# Patient Record
Sex: FEMALE | Race: OTHER | HISPANIC OR LATINO | URBAN - METROPOLITAN AREA
[De-identification: names, ages, dates, MRNs, and addresses within clinical notes are randomized per-mention and may not be internally consistent; named-entity substitution may affect disease eponyms.]

---

## 2018-05-16 ENCOUNTER — OUTPATIENT (OUTPATIENT)
Dept: OUTPATIENT SERVICES | Facility: HOSPITAL | Age: 28
LOS: 1 days | Discharge: HOME | End: 2018-05-16

## 2018-06-27 ENCOUNTER — OUTPATIENT (OUTPATIENT)
Dept: OUTPATIENT SERVICES | Facility: HOSPITAL | Age: 28
LOS: 1 days | Discharge: HOME | End: 2018-06-27

## 2018-06-30 ENCOUNTER — INPATIENT (INPATIENT)
Facility: HOSPITAL | Age: 28
LOS: 1 days | Discharge: HOME | End: 2018-07-02
Attending: HOSPITALIST | Admitting: HOSPITALIST
Payer: SUBSIDIZED

## 2018-06-30 VITALS
TEMPERATURE: 98 F | RESPIRATION RATE: 18 BRPM | SYSTOLIC BLOOD PRESSURE: 118 MMHG | WEIGHT: 134.92 LBS | DIASTOLIC BLOOD PRESSURE: 78 MMHG | HEART RATE: 90 BPM | OXYGEN SATURATION: 99 % | HEIGHT: 65 IN

## 2018-06-30 LAB
ALBUMIN SERPL ELPH-MCNC: 4.6 G/DL — SIGNIFICANT CHANGE UP (ref 3.5–5.2)
ALP SERPL-CCNC: 88 U/L — SIGNIFICANT CHANGE UP (ref 30–115)
ALT FLD-CCNC: 15 U/L — SIGNIFICANT CHANGE UP (ref 0–41)
ANION GAP SERPL CALC-SCNC: 15 MMOL/L — HIGH (ref 7–14)
APTT BLD: 31 SEC — SIGNIFICANT CHANGE UP (ref 27–39.2)
AST SERPL-CCNC: 19 U/L — SIGNIFICANT CHANGE UP (ref 0–41)
BASOPHILS # BLD AUTO: 0.02 K/UL — SIGNIFICANT CHANGE UP (ref 0–0.2)
BASOPHILS NFR BLD AUTO: 0.2 % — SIGNIFICANT CHANGE UP (ref 0–1)
BILIRUB SERPL-MCNC: 0.5 MG/DL — SIGNIFICANT CHANGE UP (ref 0.2–1.2)
BUN SERPL-MCNC: 8 MG/DL — LOW (ref 10–20)
CALCIUM SERPL-MCNC: 9.3 MG/DL — SIGNIFICANT CHANGE UP (ref 8.5–10.1)
CHLORIDE SERPL-SCNC: 95 MMOL/L — LOW (ref 98–110)
CO2 SERPL-SCNC: 22 MMOL/L — SIGNIFICANT CHANGE UP (ref 17–32)
CREAT SERPL-MCNC: 0.6 MG/DL — LOW (ref 0.7–1.5)
EOSINOPHIL # BLD AUTO: 0.02 K/UL — SIGNIFICANT CHANGE UP (ref 0–0.7)
EOSINOPHIL NFR BLD AUTO: 0.2 % — SIGNIFICANT CHANGE UP (ref 0–8)
GLUCOSE SERPL-MCNC: 149 MG/DL — HIGH (ref 70–99)
HCT VFR BLD CALC: 34 % — LOW (ref 37–47)
HGB BLD-MCNC: 11.5 G/DL — LOW (ref 12–16)
IMM GRANULOCYTES NFR BLD AUTO: 0.2 % — SIGNIFICANT CHANGE UP (ref 0.1–0.3)
INR BLD: 1.37 RATIO — HIGH (ref 0.65–1.3)
LACTATE SERPL-SCNC: 1.2 MMOL/L — SIGNIFICANT CHANGE UP (ref 0.5–2.2)
LYMPHOCYTES # BLD AUTO: 1.43 K/UL — SIGNIFICANT CHANGE UP (ref 1.2–3.4)
LYMPHOCYTES # BLD AUTO: 11.6 % — LOW (ref 20.5–51.1)
MCHC RBC-ENTMCNC: 28.5 PG — SIGNIFICANT CHANGE UP (ref 27–31)
MCHC RBC-ENTMCNC: 33.8 G/DL — SIGNIFICANT CHANGE UP (ref 32–37)
MCV RBC AUTO: 84.2 FL — SIGNIFICANT CHANGE UP (ref 81–99)
MONOCYTES # BLD AUTO: 0.92 K/UL — HIGH (ref 0.1–0.6)
MONOCYTES NFR BLD AUTO: 7.4 % — SIGNIFICANT CHANGE UP (ref 1.7–9.3)
NEUTROPHILS # BLD AUTO: 9.94 K/UL — HIGH (ref 1.4–6.5)
NEUTROPHILS NFR BLD AUTO: 80.4 % — HIGH (ref 42.2–75.2)
NRBC # BLD: 0 /100 WBCS — SIGNIFICANT CHANGE UP (ref 0–0)
PLATELET # BLD AUTO: 232 K/UL — SIGNIFICANT CHANGE UP (ref 130–400)
POTASSIUM SERPL-MCNC: 3.8 MMOL/L — SIGNIFICANT CHANGE UP (ref 3.5–5)
POTASSIUM SERPL-SCNC: 3.8 MMOL/L — SIGNIFICANT CHANGE UP (ref 3.5–5)
PROT SERPL-MCNC: 7.5 G/DL — SIGNIFICANT CHANGE UP (ref 6–8)
PROTHROM AB SERPL-ACNC: 14.7 SEC — HIGH (ref 9.95–12.87)
RBC # BLD: 4.04 M/UL — LOW (ref 4.2–5.4)
RBC # FLD: 14 % — SIGNIFICANT CHANGE UP (ref 11.5–14.5)
SODIUM SERPL-SCNC: 132 MMOL/L — LOW (ref 135–146)
WBC # BLD: 12.36 K/UL — HIGH (ref 4.8–10.8)
WBC # FLD AUTO: 12.36 K/UL — HIGH (ref 4.8–10.8)

## 2018-06-30 RX ORDER — AMPICILLIN SODIUM AND SULBACTAM SODIUM 250; 125 MG/ML; MG/ML
3 INJECTION, POWDER, FOR SUSPENSION INTRAMUSCULAR; INTRAVENOUS EVERY 6 HOURS
Refills: 0 | Status: DISCONTINUED | OUTPATIENT
Start: 2018-06-30 | End: 2018-07-02

## 2018-06-30 RX ORDER — AMPICILLIN SODIUM AND SULBACTAM SODIUM 250; 125 MG/ML; MG/ML
3 INJECTION, POWDER, FOR SUSPENSION INTRAMUSCULAR; INTRAVENOUS ONCE
Refills: 0 | Status: COMPLETED | OUTPATIENT
Start: 2018-06-30 | End: 2018-06-30

## 2018-06-30 RX ORDER — MORPHINE SULFATE 50 MG/1
4 CAPSULE, EXTENDED RELEASE ORAL ONCE
Refills: 0 | Status: DISCONTINUED | OUTPATIENT
Start: 2018-06-30 | End: 2018-06-30

## 2018-06-30 RX ORDER — MORPHINE SULFATE 50 MG/1
4 CAPSULE, EXTENDED RELEASE ORAL
Refills: 0 | Status: DISCONTINUED | OUTPATIENT
Start: 2018-06-30 | End: 2018-07-02

## 2018-06-30 RX ORDER — SODIUM CHLORIDE 9 MG/ML
1000 INJECTION, SOLUTION INTRAVENOUS
Refills: 0 | Status: DISCONTINUED | OUTPATIENT
Start: 2018-06-30 | End: 2018-07-02

## 2018-06-30 RX ADMIN — MORPHINE SULFATE 4 MILLIGRAM(S): 50 CAPSULE, EXTENDED RELEASE ORAL at 15:17

## 2018-06-30 RX ADMIN — AMPICILLIN SODIUM AND SULBACTAM SODIUM 200 GRAM(S): 250; 125 INJECTION, POWDER, FOR SUSPENSION INTRAMUSCULAR; INTRAVENOUS at 15:02

## 2018-06-30 RX ADMIN — AMPICILLIN SODIUM AND SULBACTAM SODIUM 200 GRAM(S): 250; 125 INJECTION, POWDER, FOR SUSPENSION INTRAMUSCULAR; INTRAVENOUS at 23:28

## 2018-06-30 RX ADMIN — SODIUM CHLORIDE 100 MILLILITER(S): 9 INJECTION, SOLUTION INTRAVENOUS at 20:29

## 2018-06-30 NOTE — ED PROVIDER NOTE - PROGRESS NOTE DETAILS
Dental evaluated patient. Agrees with admission for IV abx. ENT ELIAZAR Aj aware and will evaluate during admission but sts findings on CT are dental related, not ENT.

## 2018-06-30 NOTE — H&P ADULT - NSHPSOCIALHISTORY_GEN_ALL_CORE
Marital Status:  (   )    (has a boyfriend) Single    (   )    (  )   Occupation: not working  Lives with: (  ) alone  (  ) children   (  ) spouse   (  ) parents  (partner) other    Substance Use (street drugs): (x) never used  (  ) other:  Tobacco Usage:  ( x) never smoked   (   ) former smoker   (   ) current smoker  (     ) pack year  (        ) last cigarette date  Alcohol Usage: none

## 2018-06-30 NOTE — ED PROVIDER NOTE - ATTENDING CONTRIBUTION TO CARE
seen with PA agree with above, extensive facial swellin, CT- swelling down to  carotids and hyoid bone, will need admission for more antibiotics and further evaluation

## 2018-06-30 NOTE — H&P ADULT - ASSESSMENT
28Y F with no significant pmh presents to the ED with R facial swelling, erythema, pain and warmth since she had 2 wisdom teeth extracted on the same side.    R sided mandibular swelling, erythema and pain 2/2 28Y F with no significant pmh presents to the ED with R facial swelling, erythema, pain and warmth since she had 2 wisdom teeth extracted on the same side.    R sided mandibular swelling, erythema and pain 2/2 possible neck abscess and mandibular inflammation  -Admit to medicine  -Start unasyn 6gm q8h  -dental and ENT following  -IV steroids for decreasing inflammation and since pt is having difficulty swallowing 2/2 swelling and pain  -IV morphine for pain control    Hyponatremia  -likely 2/2 decreased PO intake  -Start on LR @ 100ml/hr    Diet: liquid diet    DVT ppx: encourage early ambulation    Dispo: pt can be discharged on PO abx once symptoms improve 28Y F with no significant pmh presents to the ED with R facial swelling, erythema, pain and warmth since she had 2 wisdom teeth extracted on the same side.    R sided mandibular swelling, erythema and pain 2/2 possible neck abscess and mandibular inflammation  -Admit to medicine  -Start unasyn 6gm q8h  -dental and ENT following  -steroid solution for decreasing inflammation and since pt is having difficulty swallowing 2/2 swelling and pain  -IV morphine for pain control    Hyponatremia  -likely 2/2 decreased PO intake  -Start on LR @ 100ml/hr    Diet: liquid diet    DVT ppx: encourage early ambulation    Dispo: pt can be discharged on PO abx once symptoms improve

## 2018-06-30 NOTE — H&P ADULT - NSHPPHYSICALEXAM_GEN_ALL_CORE
T(F): 97.7 (06-30-18 @ 12:15), Max: 97.7 (06-30-18 @ 12:15)  HR: 90 (06-30-18 @ 12:15) (90 - 90)  BP: 118/78 (06-30-18 @ 12:15) (118/78 - 118/78)  RR: 18 (06-30-18 @ 12:15) (18 - 18)  SpO2: 99% (06-30-18 @ 12:15) (99% - 99%)  PHYSICAL EXAM:  GENERAL: NAD, speaks in full few words due to facial swelling, no signs of respiratory distress  HEAD: R sided facial/mandibular swelling, erythema, warmth. mild lymphadenopathy on R mandibular region  EYES: EOMI, conjunctiva and sclera clear  CHEST/LUNG: Clear to auscultation bilaterally; No wheeze; No crackles; No accessory muscles used  HEART: Regular rate and rhythm; No murmurs;   ABDOMEN: Soft, Nontender, Nondistended; Bowel sounds present; No guarding  EXTREMITIES:  2+ Peripheral Pulses, No cyanosis or edema  PSYCH: AAOx3  NEUROLOGY: non-focal

## 2018-06-30 NOTE — ED PROVIDER NOTE - OBJECTIVE STATEMENT
facial swelling Pt is a 29 yo F c/o R facial swelling sp wisdom tooth extraction in the clinic 1 week ago. Pt has been taking Amoxicillin since the surgery but today woke up with significant R lower facial swelling and pain. Pt reports difficulty opening her mouth due to pain. Sts pain also extends into R neck. No fever or chills. Pt able to swallow and has no difficulty breathing. Denies drooling or inability to tolerate secretions.

## 2018-06-30 NOTE — H&P ADULT - NSHPLABSRESULTS_GEN_ALL_CORE
11.5   12.36 )-----------( 232      ( 30 Jun 2018 12:31 )             34.0       06-30    132<L>  |  95<L>  |  8<L>  ----------------------------<  149<H>  3.8   |  22  |  0.6<L>    Ca    9.3      30 Jun 2018 12:31    TPro  7.5  /  Alb  4.6  /  TBili  0.5  /  DBili  x   /  AST  19  /  ALT  15  /  AlkPhos  88  06-30          PT/INR - ( 30 Jun 2018 12:31 )   PT: 14.70 sec;   INR: 1.37 ratio         PTT - ( 30 Jun 2018 12:31 )  PTT:31.0 sec    Lactate Trend  06-30 @ 12:31 Lactate:1.2

## 2018-06-30 NOTE — H&P ADULT - HISTORY OF PRESENT ILLNESS
28Y F with no significant pmh presents to the ED with R facial swelling, erythema, pain and warmth since she had 2 wisdom teeth extracted on the same side. As per patient the swelling and redness never resolved and infact became worse with time. She was discharged on Amoxicillin 500 q8h abd tylenol with codeine 28Y F with no significant pmh presents to the ED with R facial swelling, erythema, pain and warmth since she had 2 wisdom teeth extracted on the same side. As per patient the swelling and redness never resolved and became worse with time. She was discharged on Amoxicillin 500 q8h and tylenol with codeine which she says she was taking since the discharge. Patient also developed severe pain which is 10/10 and radiates to the R side of the neck. Patient denies fevers, chills, pus from the incision site 28Y F with no significant pmh presents to the ED with R facial swelling, erythema, pain and warmth since she had 2 wisdom teeth extracted on the same side. As per patient the swelling and redness never resolved and became worse with time. She was discharged on Amoxicillin 500 q8h and tylenol with codeine which she says she was taking since the discharge. Patient also developed severe pain which is 10/10 and radiates to the R side of the neck. Patient denies fevers, chills, pus from the incision site. Due to the pain she has decreased PO intake. CT scan showed extensive inflammation and fluid within the carotid sheath. She was seen by dental in the ED and recommended to get IV abx.

## 2018-06-30 NOTE — CONSULT NOTE ADULT - SUBJECTIVE AND OBJECTIVE BOX
29 yo female s/p right wisdom tooth extraction 6/27, pt started developing right facial swelling and trismus Wednesday night, pt presented to ED with worsening facial/neck swelling on right    PAST MEDICAL & SURGICAL HISTORY:  none    Home Medications:  amoxicillin    Allergies    No Known Allergies    Intolerances    SHx - NC    FHx - NC    Vital Signs Last 24 Hrs  T(C): 36.5 (30 Jun 2018 12:15), Max: 36.5 (30 Jun 2018 12:15)  T(F): 97.7 (30 Jun 2018 12:15), Max: 97.7 (30 Jun 2018 12:15)  HR: 90 (30 Jun 2018 12:15) (90 - 90)  BP: 118/78 (30 Jun 2018 12:15) (118/78 - 118/78)  RR: 18 (30 Jun 2018 12:15) (18 - 18)  SpO2: 99% (30 Jun 2018 12:15) (99% - 99%)    pt seen and examined at bedside  a+ox3, nad, toxic appearance  nc/at, perrl  +right cheek swelling/induration/erythema ttp, selling goes down to right submandibular area  oc - +trismus, 2 finger breadths, no visible pus in area of extracted tooth, + right buccal mucosa swelling                          11.5   12.36 )-----------( 232      ( 30 Jun 2018 12:31 )             34.0     < from: CT Neck Soft Tissue w/ IV Cont (06.30.18 @ 15:45) >  IMPRESSION:    1. Extensive inflammatory changes predominantly lateral to the right side   of the mandible (third molar tooth extraction pocket identified) with   extension inferiorly to the level of the hyoid bone. A discrete enhancing   fluid collection is not identified. Fluid withinthe right carotid sheath.    2. Bilateral level I and level II lymphadenopathy likely reactive    < end of copied text >
28 year old female presents with lower right facial swelling post surgical extraction of #32 in dental clinic on 6/27/18. Patient reports taking Amoxicillin as previously prescribed.      MEDICATIONS  (STANDING):  ampicillin/sulbactam  IVPB 3 Gram(s) IV Intermittent Once    Vital Signs Last 24 Hrs  T(C): 36.5 (30 Jun 2018 12:15), Max: 36.5 (30 Jun 2018 12:15)  T(F): 97.7 (30 Jun 2018 12:15), Max: 97.7 (30 Jun 2018 12:15)  HR: 90 (30 Jun 2018 12:15) (90 - 90)  BP: 118/78 (30 Jun 2018 12:15) (118/78 - 118/78)  BP(mean): --  RR: 18 (30 Jun 2018 12:15) (18 - 18)  SpO2: 99% (30 Jun 2018 12:15) (99% - 99%)    LABS:                        11.5   12.36 )-----------( 232      ( 30 Jun 2018 12:31 )             34.0     06-30    132<L>  |  95<L>  |  8<L>  ----------------------------<  149<H>  3.8   |  22  |  0.6<L>    Ca    9.3      30 Jun 2018 12:31    TPro  7.5  /  Alb  4.6  /  TBili  0.5  /  DBili  x   /  AST  19  /  ALT  15  /  AlkPhos  88  06-30    WBC Count: 12.36 K/uL <H> [4.80 - 10.80] (06-30 @ 12:31)  Platelet Count - Automated: 232 K/uL [130 - 400] (06-30 @ 12:31)  INR: 1.37 ratio <H> [0.65 - 1.30] (06-30 @ 12:31)

## 2018-06-30 NOTE — ED PROVIDER NOTE - NS ED ROS FT
CONST: No fever, chills or bodyaches  EYES: No pain, redness, drainage or visual changes.  ENT: +R facial swelling nad pain. No ear pain or discharge, nasal discharge or congestion. No sore throat  CARD: No chest pain, palpitations  RESP: No SOB, cough, hemoptysis. No hx of asthma or COPD  GI: No abdominal pain, N/V/D  MS: No joint pain, back pain or extremity pain/injury  SKIN: No rashes  NEURO: No headache, dizziness, paresthesias or LOC

## 2018-06-30 NOTE — CONSULT NOTE ADULT - ASSESSMENT
27 yo with soft tissue infection of right cheek/neck s/p wisdom tooth extraction 6/27    CT neck r/o abscess    plan  -admit to medicine  -IV abx  -IV hydration  -may give does of steroids for symptom control  -dental f/u
Recommend IV UNASYN and CT scan of mandible and neck with contrast. Follow up with dental at 3433.

## 2018-06-30 NOTE — H&P ADULT - ATTENDING COMMENTS
Patient was evaluated and examined by bedside,  c/o jaw pain and swelling, tolerating diet well.    All labs, radiology studies, VS was reviewed  I agree with medical plan outlined by Medical resident as stated above.  1) acute right gum/mandible infection post dental procedure- continue IV Clindamycin tx, add ibuprofen, contin. prednisone, IVF, advance diet as tolerated, f/up dental specialist

## 2018-06-30 NOTE — ED PROVIDER NOTE - PHYSICAL EXAMINATION
CONST: Well appearing in NAD  EYES: PERRL, EOMI, Sclera and conjunctiva clear.   ENT: +Significant swelling along R lower jaw extending to submandibular area with tenderness and mild erythema. +Tenderness along R lower teeth and gums, no obvious abscess. No nasal discharge. TM's clear B/L without drainage. Oropharynx normal appearing, no erythema or exudates. Uvula midline.  NECK: Non-tender, no meningeal signs  CARD: Normal S1 S2; Normal rate and rhythm  RESP: Equal BS B/L, No wheezes, rhonchi or rales. No distress  GI: Soft, non-tender, non-distended.  MS: Normal ROM in all extremities. No midline spinal tenderness.  SKIN: Warm, dry, no acute rashes. Good turgor  NEURO: A&Ox3, No focal deficits. Strength 5/5 with no sensory deficits. Steady gait

## 2018-07-01 LAB
ANION GAP SERPL CALC-SCNC: 15 MMOL/L — HIGH (ref 7–14)
BASOPHILS # BLD AUTO: 0.02 K/UL — SIGNIFICANT CHANGE UP (ref 0–0.2)
BASOPHILS NFR BLD AUTO: 0.2 % — SIGNIFICANT CHANGE UP (ref 0–1)
BUN SERPL-MCNC: 5 MG/DL — LOW (ref 10–20)
CALCIUM SERPL-MCNC: 8.6 MG/DL — SIGNIFICANT CHANGE UP (ref 8.5–10.1)
CHLORIDE SERPL-SCNC: 99 MMOL/L — SIGNIFICANT CHANGE UP (ref 98–110)
CO2 SERPL-SCNC: 26 MMOL/L — SIGNIFICANT CHANGE UP (ref 17–32)
CREAT SERPL-MCNC: 0.5 MG/DL — LOW (ref 0.7–1.5)
EOSINOPHIL # BLD AUTO: 0.08 K/UL — SIGNIFICANT CHANGE UP (ref 0–0.7)
EOSINOPHIL NFR BLD AUTO: 0.9 % — SIGNIFICANT CHANGE UP (ref 0–8)
GLUCOSE SERPL-MCNC: 94 MG/DL — SIGNIFICANT CHANGE UP (ref 70–99)
HCT VFR BLD CALC: 28.9 % — LOW (ref 37–47)
HGB BLD-MCNC: 9.8 G/DL — LOW (ref 12–16)
IMM GRANULOCYTES NFR BLD AUTO: 0.3 % — SIGNIFICANT CHANGE UP (ref 0.1–0.3)
LYMPHOCYTES # BLD AUTO: 1.45 K/UL — SIGNIFICANT CHANGE UP (ref 1.2–3.4)
LYMPHOCYTES # BLD AUTO: 16 % — LOW (ref 20.5–51.1)
MCHC RBC-ENTMCNC: 28.7 PG — SIGNIFICANT CHANGE UP (ref 27–31)
MCHC RBC-ENTMCNC: 33.9 G/DL — SIGNIFICANT CHANGE UP (ref 32–37)
MCV RBC AUTO: 84.5 FL — SIGNIFICANT CHANGE UP (ref 81–99)
MONOCYTES # BLD AUTO: 0.73 K/UL — HIGH (ref 0.1–0.6)
MONOCYTES NFR BLD AUTO: 8.1 % — SIGNIFICANT CHANGE UP (ref 1.7–9.3)
NEUTROPHILS # BLD AUTO: 6.74 K/UL — HIGH (ref 1.4–6.5)
NEUTROPHILS NFR BLD AUTO: 74.5 % — SIGNIFICANT CHANGE UP (ref 42.2–75.2)
PLATELET # BLD AUTO: 212 K/UL — SIGNIFICANT CHANGE UP (ref 130–400)
POTASSIUM SERPL-MCNC: 3.6 MMOL/L — SIGNIFICANT CHANGE UP (ref 3.5–5)
POTASSIUM SERPL-SCNC: 3.6 MMOL/L — SIGNIFICANT CHANGE UP (ref 3.5–5)
RBC # BLD: 3.42 M/UL — LOW (ref 4.2–5.4)
RBC # FLD: 14.4 % — SIGNIFICANT CHANGE UP (ref 11.5–14.5)
SODIUM SERPL-SCNC: 140 MMOL/L — SIGNIFICANT CHANGE UP (ref 135–146)
WBC # BLD: 9.05 K/UL — SIGNIFICANT CHANGE UP (ref 4.8–10.8)
WBC # FLD AUTO: 9.05 K/UL — SIGNIFICANT CHANGE UP (ref 4.8–10.8)

## 2018-07-01 RX ORDER — IBUPROFEN 200 MG
400 TABLET ORAL EVERY 6 HOURS
Refills: 0 | Status: DISCONTINUED | OUTPATIENT
Start: 2018-07-01 | End: 2018-07-02

## 2018-07-01 RX ADMIN — Medication 100 MILLIGRAM(S): at 22:01

## 2018-07-01 RX ADMIN — AMPICILLIN SODIUM AND SULBACTAM SODIUM 200 GRAM(S): 250; 125 INJECTION, POWDER, FOR SUSPENSION INTRAMUSCULAR; INTRAVENOUS at 11:19

## 2018-07-01 RX ADMIN — Medication 400 MILLIGRAM(S): at 17:44

## 2018-07-01 RX ADMIN — Medication 100 MILLIGRAM(S): at 14:07

## 2018-07-01 RX ADMIN — AMPICILLIN SODIUM AND SULBACTAM SODIUM 200 GRAM(S): 250; 125 INJECTION, POWDER, FOR SUSPENSION INTRAMUSCULAR; INTRAVENOUS at 05:33

## 2018-07-01 RX ADMIN — Medication 40 MILLIGRAM(S): at 05:33

## 2018-07-01 RX ADMIN — Medication 400 MILLIGRAM(S): at 22:31

## 2018-07-01 RX ADMIN — AMPICILLIN SODIUM AND SULBACTAM SODIUM 200 GRAM(S): 250; 125 INJECTION, POWDER, FOR SUSPENSION INTRAMUSCULAR; INTRAVENOUS at 17:45

## 2018-07-02 ENCOUNTER — TRANSCRIPTION ENCOUNTER (OUTPATIENT)
Age: 28
End: 2018-07-02

## 2018-07-02 VITALS
RESPIRATION RATE: 18 BRPM | HEART RATE: 60 BPM | TEMPERATURE: 98 F | SYSTOLIC BLOOD PRESSURE: 99 MMHG | DIASTOLIC BLOOD PRESSURE: 63 MMHG

## 2018-07-02 LAB
HCT VFR BLD CALC: 29.9 % — LOW (ref 37–47)
HGB BLD-MCNC: 10 G/DL — LOW (ref 12–16)
MCHC RBC-ENTMCNC: 28.6 PG — SIGNIFICANT CHANGE UP (ref 27–31)
MCHC RBC-ENTMCNC: 33.4 G/DL — SIGNIFICANT CHANGE UP (ref 32–37)
MCV RBC AUTO: 85.4 FL — SIGNIFICANT CHANGE UP (ref 81–99)
NRBC # BLD: 0 /100 WBCS — SIGNIFICANT CHANGE UP (ref 0–0)
PLATELET # BLD AUTO: 243 K/UL — SIGNIFICANT CHANGE UP (ref 130–400)
RBC # BLD: 3.5 M/UL — LOW (ref 4.2–5.4)
RBC # FLD: 14.4 % — SIGNIFICANT CHANGE UP (ref 11.5–14.5)
WBC # BLD: 5.99 K/UL — SIGNIFICANT CHANGE UP (ref 4.8–10.8)
WBC # FLD AUTO: 5.99 K/UL — SIGNIFICANT CHANGE UP (ref 4.8–10.8)

## 2018-07-02 PROCEDURE — 99222 1ST HOSP IP/OBS MODERATE 55: CPT

## 2018-07-02 RX ORDER — IBUPROFEN 200 MG
1 TABLET ORAL
Qty: 40 | Refills: 0
Start: 2018-07-02 | End: 2018-07-11

## 2018-07-02 RX ADMIN — Medication 400 MILLIGRAM(S): at 12:09

## 2018-07-02 RX ADMIN — AMPICILLIN SODIUM AND SULBACTAM SODIUM 200 GRAM(S): 250; 125 INJECTION, POWDER, FOR SUSPENSION INTRAMUSCULAR; INTRAVENOUS at 12:10

## 2018-07-02 RX ADMIN — Medication 400 MILLIGRAM(S): at 01:43

## 2018-07-02 RX ADMIN — Medication 400 MILLIGRAM(S): at 02:15

## 2018-07-02 RX ADMIN — Medication 600 MILLIGRAM(S): at 14:41

## 2018-07-02 RX ADMIN — Medication 40 MILLIGRAM(S): at 05:05

## 2018-07-02 RX ADMIN — AMPICILLIN SODIUM AND SULBACTAM SODIUM 200 GRAM(S): 250; 125 INJECTION, POWDER, FOR SUSPENSION INTRAMUSCULAR; INTRAVENOUS at 06:10

## 2018-07-02 RX ADMIN — Medication 400 MILLIGRAM(S): at 12:10

## 2018-07-02 RX ADMIN — AMPICILLIN SODIUM AND SULBACTAM SODIUM 200 GRAM(S): 250; 125 INJECTION, POWDER, FOR SUSPENSION INTRAMUSCULAR; INTRAVENOUS at 01:43

## 2018-07-02 RX ADMIN — Medication 100 MILLIGRAM(S): at 05:02

## 2018-07-02 RX ADMIN — Medication 400 MILLIGRAM(S): at 05:05

## 2018-07-02 NOTE — DISCHARGE NOTE ADULT - CARE PLAN
Principal Discharge DX:	Surgical complication  Goal:	manage and prevent further complications  Assessment and plan of treatment:	take all medications as directed, follow up with dental clinic as directed.  Secondary Diagnosis:	Facial swelling  Goal:	manage and prevent complications  Assessment and plan of treatment:	take all medications as directed.  follow up as directed.

## 2018-07-02 NOTE — DISCHARGE NOTE ADULT - PROVIDER TOKENS
TOKEN:'[id number]',FREE:[LAST:[[facility] for new primary care doctor if you do not have one],PHONE:[phone]],FAX:[(   )    -],ADDRESS:[[address] [place] [address]]] FREE:[LAST:[[facility] for new primary care doctor if you do not have one],PHONE:[phone]],FAX:[(   )    -],ADDRESS:[[address] [place] [address]]]

## 2018-07-02 NOTE — DISCHARGE NOTE ADULT - CARE PROVIDER_API CALL
Diana Pavon (DDS), Dentistry  [address] [place] [address]  Phone: [phone]  Fax: [phone]    [facility] for new primary care doctor if you do not have one,   [address] [facility] [address]  Phone: [phone]  Fax: (   )    - [facility] for new primary care doctor if you do not have one,   [address]  [facility] [address]  Phone: [phone]  Fax: (   )    -

## 2018-07-02 NOTE — PROGRESS NOTE ADULT - ASSESSMENT
29 yo female with resolving swelling and cellulitis of soft tissue of right cheek and submandibular area s/p wisdom tooth extraction    wbc trending down    pt tolerating po fluids/solids    pain well managed    plan  -po abx  -pain meds prn  -f/u with dental
28 F POD 4 S/P extraction of wisdom tooth on right side presented with severe pain and post op edema adjacent to right mandible, admitted to medicine for suspected abscess of the neck/mandibular inflammation. Ct soft tissue Neck revealed some inflammation but no discrete fluid collection.  ENT and OMSF services were consulted, and report that the patient does not require surgical intervention as there is no sign of abscess, and that she can follow up with the dental clinic they day after discharge. Her pain is currently improving, and her labs are trending down.    Facial swelling s/p wisdom tooth extraction:  -no abscess on Ct soft tissue neck  -currently on unasyn  -OMFS suggest PO augmentin 825mg/125mg BID for 7 days upon discharge  -Patient to F/U in dental clinic the day after D/C  -pain is improving with IV morphine  -patient   -steroid solution for decreasing inflammation and since pt is having difficulty swallowing 2/2 swelling and pain  -WBC count trending down, no fever    Hyponatremia  -sodium today is 140  -f/u am labs    Diet  -liquid diet  -will advance as tolerated.     DVT ppx:   encourage early ambulation  low risk    Dispo: pt can be discharged on PO abx once symptoms improve
28 F POD 4 S/P extraction tooth #32 post op edema adjacent to right mandible, pain and swelling have improved. WBC is WNL and patient is afebrile. Patient is stable for discharge.    Recommendations: Augmentin 825mg/125 mg. Pain meds PRN. Patient will F/U in dental clinic tomorrow.
28F HD3 s/p extraction of tooth #32 in dental clinic last wednesday. Pt is clinically improved, afebrile, WBC downtrended wnl. She is stable for discharge per OMFS team.     Recommendations:  1- Augmentin 875/125 p.o. bid  2- NSAID prn pain  3- Follow up in dental clinic on Friday, July 6 at 9:00am.

## 2018-07-02 NOTE — PROGRESS NOTE ADULT - SUBJECTIVE AND OBJECTIVE BOX
27 yo female admitted due to facial swelling, cellulitis and trismus s/p wisdom tooth extraction  no events noted over night  pt states pain and swelling have improved, able to tolerate po    Vital Signs Last 24 Hrs  T(C): 36.2 (01 Jul 2018 07:30), Max: 37.1 (01 Jul 2018 00:00)  T(F): 97.2 (01 Jul 2018 07:30), Max: 98.7 (01 Jul 2018 00:00)  HR: 71 (01 Jul 2018 07:30) (71 - 91)  BP: 103/66 (01 Jul 2018 07:30) (93/57 - 124/76)  RR: 18 (01 Jul 2018 07:30) (17 - 18)  SpO2: 99% (30 Jun 2018 21:10) (99% - 99%)    pt seen and examined at bedside  a+ox3, nad, non toxic  right cheek and soft tissue overlying mandible show improvement in swelling, erythema resolved, ttp  trismus improving, 3 finger breadths wide  oc - no pus noted in tooth socket                          9.8    9.05  )-----------( 212      ( 01 Jul 2018 07:02 )             28.9
28 F POD 4 S/P extraction tooth #32. Afebrile, WBC downtrended WNL. Patient denies dysphagia and odynophagia. Pain has improved.     Exam: Patient still has tenderness adjacent to right mandible consistent with surgical manipulation. Trismus has improved. No edema in FOM, tongue has FROM. Edema has improved.
Afebrile ON. No acute events. WBC downtrended and wnl. Pt reports significant subjective improvement over hospital course. Denies dysphagia, odynophagia. She is tolerating po/secretions. Airway is intact. Denies malaise.    VSS    HEENT- Edema adjacent to R mandible tender to palpation, is much improved. No fluctuance. No fistulae. Trachea midline.  IO: Trismus improved. Occlusion stable & reproducible. Tongue FROM. Edema in buccal tissues adjacent to extraction site, consistent with surgical manipulation. No edema or tenderness in FOM. Sutures intact. No purulence.
DISCHARGE NOTE:  AMIRA VASQUEZ  [facility] F4-4B 023 B ([facility] F4[facility]4B)            Patient was evaluated and examined  by bedside, has decrease right lower jaw swelling and pain, no difficulty swallowing, no fever            REVIEW OF SYSTEMS:  please see pertinent positives mentioned above, all other 12 ROS negative      T(C): , Max: 37.1 (07-01-18 @ 16:00)  HR: 67 (07-02-18 @ 07:30)  BP: 92/55 (07-02-18 @ 07:30)  RR: 18 (07-02-18 @ 07:30)  SpO2: --  CAPILLARY BLOOD GLUCOSE          PHYSICAL EXAM:  General: NAD, AAOX3, patient is laying comfortably in bed  HEENT: right lower jaw subc. tissue edema, AT, NC, Supple, NO JVD, NO CB  Lungs: CTA B/L, no wheezing, no rhonchi  CVS: normal S1, S2, RRR, NO M/G/R  Abdomen: soft, bowel sounds present, non-tender, non-distended  Extremities: no edema, no clubbing, no cyanosis, positive peripheral pulses b/l  Neuro: no acute focal neurological deficits  Skin: no rush, no ecchymosis      LAB  CBC  Date: 07-01-18 @ 07:02  Mean cell Ggxkqfdsue33.7  Mean cell Hemoglobin Conc33.9  Mean cell Volum 84.5  Platelet count-Automate 212  RBC Count 3.42  Red Cell Distrib Width14.4  WBC Count9.05  % Albumin, Urine--  Hematocrit 28.9  Hemoglobin 9.8  CBC  Date: 06-30-18 @ 12:31  Mean cell Hwjkzsjala74.5  Mean cell Hemoglobin Conc33.8  Mean cell Volum 84.2  Platelet count-Automate 232  RBC Count 4.04  Red Cell Distrib Width14.0  WBC Count12.36  % Albumin, Urine--  Hematocrit 34.0  Hemoglobin 11.5    BMP  07-01-18 @ 07:02  Blood Gas Arterial-Calcium,Ionized--  Blood Urea Nitrogen, Serum 5 mg/dL<L> [10 - 20]  Carbon Dioxide, Serum26 mmol/L [17 - 32]  Chloride, Serum99 mmol/L [98 - 110]  Creatinie, Serum0.5 mg/dL<L> [0.7 - 1.5]  Glucose, Serum94 mg/dL [70 - 99]  Potassium, Serum3.6 mmol/L [3.5 - 5.0]  Sodium, Serum 140 mmol/L [135 - 146]  BMP  06-30-18 @ 12:31  Blood Gas Arterial-Calcium,Ionized--  Blood Urea Nitrogen, Serum 8 mg/dL<L> [10 - 20]  Carbon Dioxide, Serum22 mmol/L [17 - 32]  Chloride, Serum95 mmol/L<L> [98 - 110]  Creatinie, Serum0.6 mg/dL<L> [0.7 - 1.5]  Glucose, Texev479 mg/dL<H> [70 - 99]  Potassium, Serum3.8 mmol/L [3.5 - 5.0]  Sodium, Serum 132 mmol/L<L> [135 - 146]        PT/INR - ( 30 Jun 2018 12:31 )   PT: 14.70 sec;   INR: 1.37 ratio         PTT - ( 30 Jun 2018 12:31 )  PTT:31.0 sec          Medications:  ampicillin/sulbactam  IVPB 3 Gram(s) IV Intermittent every 6 hours  clindamycin IVPB      clindamycin IVPB 600 milliGRAM(s) IV Intermittent every 8 hours  ibuprofen  Tablet 400 milliGRAM(s) Oral every 6 hours  predniSONE   Tablet 40 milliGRAM(s) Oral daily        Assessment and Plan:  Acute right sided  mandibular Cellulitis post tooth extraction   -patient responded well to IV Clindamycin tx.  -will switch to PO Clindamycin to complete total of 10 days course  -prednisone taper for next 7 days  -PO Ibuprofen tx.  -outpatient Dental clinic f/up in next 7 days- to assess healing process    d/c plan: patient is medically stable for d/c home today
SUBJECTIVE:    Patient is a 28y old Female who presents with a chief complaint of R facial swelling, erythema, pain and warmth for 4 days. (30 Jun 2018 18:13)    Currently admitted to medicine with the primary diagnosis of Facial swelling     Today is hospital day 1d. This morning she is resting comfortably in bed and reports no new issues or overnight events. She reports that her painis currently controlled.     PAST MEDICAL & SURGICAL HISTORY  No pertinent past medical history  No significant past surgical history    SOCIAL HISTORY:  Negative for smoking/alcohol/drug use.     ALLERGIES:  No Known Allergies    MEDICATIONS:  STANDING MEDICATIONS  ampicillin/sulbactam  IVPB 3 Gram(s) IV Intermittent every 6 hours  clindamycin IVPB      clindamycin IVPB 600 milliGRAM(s) IV Intermittent every 8 hours  ibuprofen  Tablet 400 milliGRAM(s) Oral every 6 hours  lactated ringers. 1000 milliLiter(s) IV Continuous <Continuous>  predniSONE   Tablet 40 milliGRAM(s) Oral daily    PRN MEDICATIONS  morphine  - Injectable 4 milliGRAM(s) IV Push four times a day PRN    VITALS:   T(F): 97.2  HR: 71  BP: 103/66  RR: 18  SpO2: 99%    LABS:                        9.8    9.05  )-----------( 212      ( 01 Jul 2018 07:02 )             28.9     07-01    140  |  99  |  5<L>  ----------------------------<  94  3.6   |  26  |  0.5<L>    Ca    8.6      01 Jul 2018 07:02    TPro  7.5  /  Alb  4.6  /  TBili  0.5  /  DBili  x   /  AST  19  /  ALT  15  /  AlkPhos  88  06-30    PT/INR - ( 30 Jun 2018 12:31 )   PT: 14.70 sec;   INR: 1.37 ratio         PTT - ( 30 Jun 2018 12:31 )  PTT:31.0 sec      RADIOLOGY:    PHYSICAL EXAM:  GEN: NAD, swelling and tenderness present on the right cheek.   LUNGS: Clear to auscultation bilaterally.   HEART: +s1s2 RRR.   ABD: Soft, NT/ND. (+) bs  EXT: no c/c/e. 2+PP, FLANNERY  NEURO: AAOX3. No focal deficits

## 2018-07-02 NOTE — DISCHARGE NOTE ADULT - MEDICATION SUMMARY - MEDICATIONS TO STOP TAKING
I will STOP taking the medications listed below when I get home from the hospital:    amoxicillin 500 mg oral tablet  -- 1 tab(s) by mouth every 8 hours

## 2018-07-02 NOTE — DISCHARGE NOTE ADULT - PLAN OF CARE
manage and prevent further complications take all medications as directed, follow up with dental clinic as directed. manage and prevent complications take all medications as directed.  follow up as directed.

## 2018-07-02 NOTE — DISCHARGE NOTE ADULT - PATIENT PORTAL LINK FT
You can access the [facility] Patient Portal, offered by [facility], by registering with the following website: [email or web address]

## 2018-07-02 NOTE — DISCHARGE NOTE ADULT - MEDICATION SUMMARY - MEDICATIONS TO TAKE
I will START or STAY ON the medications listed below when I get home from the hospital:    Deltasone 20 mg oral tablet  -- 2 tab(s) by mouth for 1 day  then 1.5 tabs for two days  then 1 tab for two days  then half a tablet for two days, then stop  -- Indication: For Facial swelling    ibuprofen 400 mg oral tablet  -- 1 tab(s) by mouth every 6 hours  -- Indication: For pain    clindamycin 300 mg oral capsule  -- 2 cap(s) by mouth every 8 hours  -- Indication: For infection

## 2018-07-02 NOTE — DISCHARGE NOTE ADULT - ADDITIONAL INSTRUCTIONS
please follow up with the dental clinic today after you are discharged from the hospital (it is within the hospital building).  Please follow up with your primary care physician or a new primary care physician form the [facility] (contact information is below) within 2 weeks of discharge.  Please keep your appointment with oral surgery on August first at 1:00 pm.  Please complete your course of antibiotics and prednisone (steroid) as directed.   All relevant contact information is listed below. please follow up with the oral surgery clinic this Friday (where your extraction was performed).  Please follow up with your primary care physician or a new primary care physician form the [facility] (contact information is below) within 2 weeks of discharge.  Please keep your appointment with oral surgery on August first at 1:00 pm.  Please complete your course of antibiotics and prednisone (steroid) as directed.   All relevant contact information is listed below.  Please  your medications from [facility]

## 2018-07-06 ENCOUNTER — OUTPATIENT (OUTPATIENT)
Dept: OUTPATIENT SERVICES | Facility: HOSPITAL | Age: 28
LOS: 1 days | Discharge: HOME | End: 2018-07-06

## 2018-07-16 DIAGNOSIS — E87.1 HYPO-OSMOLALITY AND HYPONATREMIA: ICD-10-CM

## 2018-07-16 DIAGNOSIS — K12.2 CELLULITIS AND ABSCESS OF MOUTH: ICD-10-CM

## 2018-07-16 DIAGNOSIS — Z87.891 PERSONAL HISTORY OF NICOTINE DEPENDENCE: ICD-10-CM

## 2018-07-16 DIAGNOSIS — R22.0 LOCALIZED SWELLING, MASS AND LUMP, HEAD: ICD-10-CM

## 2019-06-24 ENCOUNTER — EMERGENCY (EMERGENCY)
Facility: HOSPITAL | Age: 29
LOS: 0 days | Discharge: HOME | End: 2019-06-24
Attending: EMERGENCY MEDICINE | Admitting: EMERGENCY MEDICINE
Payer: MEDICAID

## 2019-06-24 VITALS
TEMPERATURE: 98 F | SYSTOLIC BLOOD PRESSURE: 107 MMHG | OXYGEN SATURATION: 97 % | RESPIRATION RATE: 18 BRPM | HEART RATE: 80 BPM | DIASTOLIC BLOOD PRESSURE: 58 MMHG

## 2019-06-24 DIAGNOSIS — Z79.1 LONG TERM (CURRENT) USE OF NON-STEROIDAL ANTI-INFLAMMATORIES (NSAID): ICD-10-CM

## 2019-06-24 DIAGNOSIS — Z79.52 LONG TERM (CURRENT) USE OF SYSTEMIC STEROIDS: ICD-10-CM

## 2019-06-24 DIAGNOSIS — R05 COUGH: ICD-10-CM

## 2019-06-24 DIAGNOSIS — Z79.2 LONG TERM (CURRENT) USE OF ANTIBIOTICS: ICD-10-CM

## 2019-06-24 PROCEDURE — 71046 X-RAY EXAM CHEST 2 VIEWS: CPT | Mod: 26

## 2019-06-24 PROCEDURE — 99283 EMERGENCY DEPT VISIT LOW MDM: CPT

## 2019-06-24 NOTE — ED PROVIDER NOTE - PROVIDER TOKENS
PROVIDER:[TOKEN:[[id number]],FOLLOWUP:[1-3 Days]],PROVIDER:[TOKEN:[[id number]],FOLLOWUP:[1-3 Days]]

## 2019-06-24 NOTE — ED PROVIDER NOTE - PHYSICAL EXAMINATION
CONSTITUTIONAL: Well-developed; well-nourished; in no acute distress.   SKIN: warm, dry  ENT: No nasal discharge; airway clear.  NECK: Supple; non tender.  CARD: S1, S2 normal; no murmurs, gallops, or rubs. Regular rate and rhythm.   RESP: No wheezes, rales or rhonchi.  LYMPH: No acute cervical adenopathy.  NEURO: Alert, oriented, grossly unremarkable  PSYCH: Cooperative, appropriate.

## 2019-06-24 NOTE — ED PROVIDER NOTE - NS ED ROS FT
Eyes:  No visual changes, eye pain or discharge.  ENMT:  No hearing changes, pain, no sore throat or runny nose, no difficulty swallowing  Cardiac:  No chest pain, SOB or edema. No chest pain with exertion.  Respiratory:  No respiratory distress. No hemoptysis. No history of asthma or RAD. + cough  GI:  No nausea, vomiting, diarrhea or abdominal pain.  :  No dysuria, frequency or burning.  MS:  No myalgia, muscle weakness, joint pain or back pain.  Neuro:  No headache or weakness.  No LOC.  Skin:  No skin rash.   Endocrine: No history of thyroid disease or diabetes.

## 2019-06-24 NOTE — ED PROVIDER NOTE - CARE PROVIDER_API CALL
Denis Zhu)  Gastroenterology  [address] [place] [address]  Phone: [phone]  Fax: [phone]  Follow Up Time: 1-3 Days    Toy Pacheco)  Critical Care Medicine; Geriatric Medicine; Internal Medicine; Pulmonary Disease  [address] [place] [address]  Phone: [phone]  Fax: [phone]  Follow Up Time: 1-3 Days

## 2019-06-24 NOTE — ED PROVIDER NOTE - PROGRESS NOTE DETAILS
MANUELARussellseda: 30 y/o female with no pmhx presents with cough for the past 3 months. Patient states the cough is progressively worsening, usually worse at night. Patient admits to taking omeprazole one dose per PMD recommendations which did not help. Patient states she took 's inhaler when she was having a coughing fit and it helped with symptoms. Denies fevers/chills, sob, chest pain, heart burn, abdominal pain, nausea.   Exam: Well appearing female, speaking in full sentences; lungs CTA b/l, no murmurs, rubs, gallops.  Plan: CXR, follow up with GI and pulmonology.

## 2019-06-24 NOTE — ED PROVIDER NOTE - ATTENDING CONTRIBUTION TO CARE
29 year old female, no pmhx, presenting with cough x 3 months. States it is primarily worse at night. Has tried protonix, omeprazole, pepcid without relief. Denies pain and otherwise denies fevers, headache, vision changes, weakness/numbness, confusion, URI symptoms, neck pain, chest pain, back pain, dyspnea, palpitations, nausea, vomiting, abdominal pain, diarrhea, constipation, blood in stool/dark stools, urinary symptoms, vaginal bleeding/discharge, leg swelling, rash, recent travel or sick contacts.    Vital Signs: I have reviewed the initial vital signs.  Constitutional: NAD, well-nourished, appears stated age, no acute distress.  HEENT: Airway patent, moist MM, no erythema/swelling/deformity of oral structures. EOMI, PERRLA.  CV: regular rate, regular rhythm, well-perfused extremities, 2+ b/l DP and radial pulses equal.  Lungs: BCTA, no increased WOB.  ABD: NTND, no guarding or rebound, no pulsatile mass, no hernias.   MSK: Neck supple, nontender, nl ROM, no stepoff. Chest nontender. Back nontender in TLS spine or to b/l bony structures or flanks. Ext nontender, nl rom, no deformity.   INTEG: Skin warm, dry, no rash.  NEURO: A&Ox3, normal strength, nl sensation throughout, normal speech.   PSYCH: Calm, cooperative, normal affect and interaction.    Lungs clear. Will obtain CXR given duration of sxs, re-eval.

## 2019-06-24 NOTE — ED ADULT NURSE NOTE - NSIMPLEMENTINTERV_GEN_ALL_ED
Implemented All Universal Safety Interventions:  [place] to call system. Call bell, personal items and telephone within reach. Instruct patient to call for assistance. Room bathroom lighting operational. Non-slip footwear when patient is off stretcher. Physically safe environment: no spills, clutter or unnecessary equipment. Stretcher in lowest position, wheels locked, appropriate side rails in place.

## 2019-06-24 NOTE — ED PROVIDER NOTE - CLINICAL SUMMARY MEDICAL DECISION MAKING FREE TEXT BOX
Patient presented with 3 months of cough. Otherwise afebrile, HD stable. Lungs clear. Obtained CXR which was negative for acute findings. Patient states it improves with nebs, so possibly asthma, although no wheezing on exam. Will give outpatient follow up with pulm as well as GI (as this could also be GERD), and patient to follow up as outpatient. Patient agreeable with plan. Agrees to return to ED for any new or worsening symptoms.

## 2019-06-24 NOTE — ED PROVIDER NOTE - OBJECTIVE STATEMENT
29y F w/ no sig PMH presents with cough for the past 3 months. States it is worse at night. Has tried protonix, omeprazole, and pepcid without relief. Denies fever, chills, nasal congestion, n/v/d, or numbness/tingling.

## 2019-06-24 NOTE — ED ADULT NURSE NOTE - OBJECTIVE STATEMENT
pt c/o cough and sob x 3 months. states she saw her pmd who gave her "cough medicine" but it doesn't work, also had a CXR which she states was normal. pt has been using spouses inhaler for sob.

## 2019-06-24 NOTE — ED PROVIDER NOTE - CARE PROVIDERS DIRECT ADDRESSES
[email or web address]

## 2019-06-24 NOTE — ED PROVIDER NOTE - NSFOLLOWUPINSTRUCTIONS_ED_ALL_ED_FT

## 2019-08-04 ENCOUNTER — EMERGENCY (EMERGENCY)
Facility: HOSPITAL | Age: 29
LOS: 0 days | Discharge: HOME | End: 2019-08-05
Attending: EMERGENCY MEDICINE | Admitting: EMERGENCY MEDICINE
Payer: MEDICAID

## 2019-08-04 VITALS
HEART RATE: 63 BPM | OXYGEN SATURATION: 100 % | DIASTOLIC BLOOD PRESSURE: 56 MMHG | TEMPERATURE: 99 F | SYSTOLIC BLOOD PRESSURE: 107 MMHG | RESPIRATION RATE: 18 BRPM

## 2019-08-04 DIAGNOSIS — N93.9 ABNORMAL UTERINE AND VAGINAL BLEEDING, UNSPECIFIED: ICD-10-CM

## 2019-08-04 DIAGNOSIS — O20.9 HEMORRHAGE IN EARLY PREGNANCY, UNSPECIFIED: ICD-10-CM

## 2019-08-04 DIAGNOSIS — Z3A.01 LESS THAN 8 WEEKS GESTATION OF PREGNANCY: ICD-10-CM

## 2019-08-04 LAB
ALBUMIN SERPL ELPH-MCNC: 4.3 G/DL — SIGNIFICANT CHANGE UP (ref 3.5–5.2)
ALP SERPL-CCNC: 69 U/L — SIGNIFICANT CHANGE UP (ref 30–115)
ALT FLD-CCNC: 11 U/L — SIGNIFICANT CHANGE UP (ref 0–41)
ANION GAP SERPL CALC-SCNC: 15 MMOL/L — HIGH (ref 7–14)
APPEARANCE UR: CLEAR — SIGNIFICANT CHANGE UP
AST SERPL-CCNC: 13 U/L — SIGNIFICANT CHANGE UP (ref 0–41)
BACTERIA # UR AUTO: NEGATIVE — SIGNIFICANT CHANGE UP
BASOPHILS # BLD AUTO: 0.03 K/UL — SIGNIFICANT CHANGE UP (ref 0–0.2)
BASOPHILS NFR BLD AUTO: 0.3 % — SIGNIFICANT CHANGE UP (ref 0–1)
BILIRUB SERPL-MCNC: 0.3 MG/DL — SIGNIFICANT CHANGE UP (ref 0.2–1.2)
BILIRUB UR-MCNC: NEGATIVE — SIGNIFICANT CHANGE UP
BLD GP AB SCN SERPL QL: SIGNIFICANT CHANGE UP
BUN SERPL-MCNC: 8 MG/DL — LOW (ref 10–20)
CALCIUM SERPL-MCNC: 9.6 MG/DL — SIGNIFICANT CHANGE UP (ref 8.5–10.1)
CHLORIDE SERPL-SCNC: 101 MMOL/L — SIGNIFICANT CHANGE UP (ref 98–110)
CO2 SERPL-SCNC: 22 MMOL/L — SIGNIFICANT CHANGE UP (ref 17–32)
COLOR SPEC: YELLOW — SIGNIFICANT CHANGE UP
CREAT SERPL-MCNC: 0.6 MG/DL — LOW (ref 0.7–1.5)
DIFF PNL FLD: ABNORMAL
EOSINOPHIL # BLD AUTO: 0.54 K/UL — SIGNIFICANT CHANGE UP (ref 0–0.7)
EOSINOPHIL NFR BLD AUTO: 6 % — SIGNIFICANT CHANGE UP (ref 0–8)
EPI CELLS # UR: 2 /HPF — SIGNIFICANT CHANGE UP (ref 0–5)
GLUCOSE SERPL-MCNC: 91 MG/DL — SIGNIFICANT CHANGE UP (ref 70–99)
GLUCOSE UR QL: NEGATIVE — SIGNIFICANT CHANGE UP
HCG SERPL-ACNC: 8425 MIU/ML — HIGH
HCT VFR BLD CALC: 32.7 % — LOW (ref 37–47)
HGB BLD-MCNC: 10.7 G/DL — LOW (ref 12–16)
HYALINE CASTS # UR AUTO: 4 /LPF — SIGNIFICANT CHANGE UP (ref 0–7)
IMM GRANULOCYTES NFR BLD AUTO: 0.2 % — SIGNIFICANT CHANGE UP (ref 0.1–0.3)
KETONES UR-MCNC: SIGNIFICANT CHANGE UP
LEUKOCYTE ESTERASE UR-ACNC: NEGATIVE — SIGNIFICANT CHANGE UP
LYMPHOCYTES # BLD AUTO: 3.08 K/UL — SIGNIFICANT CHANGE UP (ref 1.2–3.4)
LYMPHOCYTES # BLD AUTO: 34.1 % — SIGNIFICANT CHANGE UP (ref 20.5–51.1)
MCHC RBC-ENTMCNC: 27.2 PG — SIGNIFICANT CHANGE UP (ref 27–31)
MCHC RBC-ENTMCNC: 32.7 G/DL — SIGNIFICANT CHANGE UP (ref 32–37)
MCV RBC AUTO: 83.2 FL — SIGNIFICANT CHANGE UP (ref 81–99)
MONOCYTES # BLD AUTO: 0.72 K/UL — HIGH (ref 0.1–0.6)
MONOCYTES NFR BLD AUTO: 8 % — SIGNIFICANT CHANGE UP (ref 1.7–9.3)
NEUTROPHILS # BLD AUTO: 4.64 K/UL — SIGNIFICANT CHANGE UP (ref 1.4–6.5)
NEUTROPHILS NFR BLD AUTO: 51.4 % — SIGNIFICANT CHANGE UP (ref 42.2–75.2)
NITRITE UR-MCNC: NEGATIVE — SIGNIFICANT CHANGE UP
NRBC # BLD: 0 /100 WBCS — SIGNIFICANT CHANGE UP (ref 0–0)
PH UR: 7 — SIGNIFICANT CHANGE UP (ref 5–8)
PLATELET # BLD AUTO: 241 K/UL — SIGNIFICANT CHANGE UP (ref 130–400)
POTASSIUM SERPL-MCNC: 4.1 MMOL/L — SIGNIFICANT CHANGE UP (ref 3.5–5)
POTASSIUM SERPL-SCNC: 4.1 MMOL/L — SIGNIFICANT CHANGE UP (ref 3.5–5)
PROT SERPL-MCNC: 7.5 G/DL — SIGNIFICANT CHANGE UP (ref 6–8)
PROT UR-MCNC: SIGNIFICANT CHANGE UP
RBC # BLD: 3.93 M/UL — LOW (ref 4.2–5.4)
RBC # FLD: 16.3 % — HIGH (ref 11.5–14.5)
RBC CASTS # UR COMP ASSIST: 1 /HPF — SIGNIFICANT CHANGE UP (ref 0–4)
SODIUM SERPL-SCNC: 138 MMOL/L — SIGNIFICANT CHANGE UP (ref 135–146)
SP GR SPEC: 1.02 — SIGNIFICANT CHANGE UP (ref 1.01–1.02)
UROBILINOGEN FLD QL: SIGNIFICANT CHANGE UP
WBC # BLD: 9.03 K/UL — SIGNIFICANT CHANGE UP (ref 4.8–10.8)
WBC # FLD AUTO: 9.03 K/UL — SIGNIFICANT CHANGE UP (ref 4.8–10.8)
WBC UR QL: 2 /HPF — SIGNIFICANT CHANGE UP (ref 0–5)

## 2019-08-04 PROCEDURE — 99284 EMERGENCY DEPT VISIT MOD MDM: CPT

## 2019-08-04 RX ORDER — SODIUM CHLORIDE 9 MG/ML
1000 INJECTION INTRAMUSCULAR; INTRAVENOUS; SUBCUTANEOUS ONCE
Refills: 0 | Status: COMPLETED | OUTPATIENT
Start: 2019-08-04 | End: 2019-08-04

## 2019-08-04 RX ADMIN — SODIUM CHLORIDE 2000 MILLILITER(S): 9 INJECTION INTRAMUSCULAR; INTRAVENOUS; SUBCUTANEOUS at 22:11

## 2019-08-04 NOTE — ED ADULT NURSE NOTE - OBJECTIVE STATEMENT
Pt reports vaginal bleeding for 3 days with lower abd pain. Pt report intermittent dizziness, denies nausea, vomiting, fever, and diarrhea. Pt in no acute distress, walks with steady gait, abd rounded, soft, nontender, skin color wnl.

## 2019-08-04 NOTE — ED PROVIDER NOTE - ATTENDING CONTRIBUTION TO CARE
30 y/o f , ~ 5 weeks pregnant, reports had ultrasound about a week ago, lmp may 26th, presents with ~ 2 days of vaginal bleeding, spotting then increased, no clots, so came to ed, associated with lower abdominal cramps. denies fever, chills, n/v, cp, sob, pleuritic chest pain, palpitations, diaphoresis, cough, diarrhea, constipation, melena/brbpr, urinary symptoms, flank pain, vaginal discharge/odor, sick contacts, recent travel or rash. had been having normal bm. , (-) history of sti's, ob-gyn- states goes to clinic, had pap smear a month ago and reports was negative.  on exam: WDWN appearing female sitting on stretcher in nad, no rash, mmm, regular rate, radial pulses 2/4 b/l, no jvd, ctabl w/ breath sounds present b/l, no wheezing or crackles, good air exchange, good respiratory effort, no accessory muscle use, no tachypnea, no stridor, bs present throughout all 4 quadrants, abd soft, nd, nt,  no rebound tenderness or guarding, no cvat, pelvic exam: ext genitalia +vaginal walls pink, no pain to palpation including over bartholin glands-no swelling/inflammation, closed os, no discharge, vaginal bleeding noted in vault, pooling, no clots, no visible masses/lesions, no pelvic tenderness on bimanual exam, uterus smooth and within normal limits, no adenxa tenderness to palpation, no CMT, FROM of ext, no edema, no calf pain/swelling/erythema, AAOx3.

## 2019-08-04 NOTE — ED PROVIDER NOTE - PROGRESS NOTE DETAILS
Pt in no distress at this time Discussed with patient US findings. DIscussed need to follow up with her women's clinic or with our clinic for repeat BetaHCG. Discussed need for repeat ultrasound. Discussed signs and symptoms to return to the ED for. Pt understands and agrees with discharge plan

## 2019-08-04 NOTE — ED PROVIDER NOTE - NSFOLLOWUPINSTRUCTIONS_ED_ALL_ED_FT
Threatened Miscarriage    WHAT YOU NEED TO KNOW:    What is a threatened miscarriage? A threatened miscarriage occurs when you have vaginal bleeding within the first 20 weeks of pregnancy. It means that a miscarriage may happen. A threatened miscarriage may also be called a threatened .     What causes bleeding or spotting during pregnancy? The cause of your bleeding or spotting may not be known. The following are possible causes of vaginal bleeding during pregnancy:    Polyps, fibroids, or cysts in the uterus      Sexual intercourse      Infection      Where or how the placenta is attached to your uterus (womb)      A problem with your fetus (unborn baby)      Drug or alcohol use      Ectopic pregnancy (the fetus is growing outside of the uterus)    What are the signs and symptoms of a threatened miscarriage?     Vaginal spotting or bleeding      Pain or cramping in your abdomen or lower back    How is a threatened miscarriage diagnosed? Tell your healthcare provider when your bleeding started. You may need any of the following:     Blood tests may show infection, check your level of pregnancy hormone, or give information about your overall health.       A pelvic exam checks the size of your uterus. A pelvic exam also checks your cervix for dilation (opening).       A pelvic ultrasound shows pictures of the fetus and finds his heartbeat. A pelvic ultrasound also looks at your reproductive organs and monitors the amount of bleeding.     How is a threatened miscarriage managed? The following may help you manage your symptoms and decrease your risk for a miscarriage:     Do not put anything in your vagina. Do not have sex, douche, or use tampons. These actions may increase your risk for infection and miscarriage.       Rest as directed. Do not exercise or do strenuous activities. These activities may cause  labor or miscarriage. Ask your healthcare provider what activities are okay to do.     When should I seek immediate care?     You feel weak or faint.       Your pain or cramping in your abdomen or back gets worse.       You have vaginal bleeding that soaks 1 or more pads in an hour.       You pass material that looks like tissue or large clots.     When should I contact my healthcare provider?     You have a fever.       You have trouble urinating, burning when you urinate, or feel a need to urinate often.      You have new or worsening vaginal bleeding.      You have vaginal pain or itching, or vaginal discharge that is yellow, green, or foul-smelling.       You have questions or concerns about your condition or care.    CARE AGREEMENT:    You have the right to help plan your care. Learn about your health condition and how it may be treated. Discuss treatment options with your healthcare providers to decide what care you want to receive. You always have the right to refuse treatment.     Hemorragia vaginal earl el embarazo (primer trimestre)  Vaginal Bleeding During Pregnancy, First Trimester  Introducción  Image   Earl los primeros meses del embarazo, es común tener fatuma pequeña hemorragia vaginal (manchado). A veces, la hemorragia es normal y no causa problemas. En otras ocasiones, sin embargo, la hemorragia puede ser fatuma señal de algo grave. Informe a mark médico de inmediato si tiene algún tipo de hemorragia vaginal.    Siga estas indicaciones en mark casa:  Actividad     Siga las indicaciones de mark médico con respecto al reggie de actividad que puede realizar.  De ser necesario, organícese para que alguien la ayude con las actividades habituales.  No tenga relaciones sexuales ni orgasmos hasta que el médico le diga que es seguro.  Instrucciones generales     [place] los medicamentos de venta eliane y los recetados solamente kain se lo haya indicado el médico.  Controle mark afección para detectar cualquier cambio.  Escriba los siguientes datos:  La cantidad de toallas higiénicas que usa cada día.  La frecuencia con la que se cambia las toallas higiénicas.  Qué tan empapadas (saturadas) están.  No use tampones.  No se shu duchas vaginales.  Si elimina tejido por la vagina, guárdelo para mostrárselo al médico.  Concurra a todas las visitas de seguimiento kain se lo haya indicado el médico. [place] es importante.  Comuníquese con un médico si:  Tiene fatuma hemorragia vaginal earl el embarazo.  Tiene cólicos.  Tiene fiebre.  Solicite ayuda de inmediato si:  Siente cólicos muy intensos en la espalda o en el vientre (abdomen).  Elimina coágulos grandes o mucho tejido por la vagina.  La hemorragia empeora.  Siente que va a desvanecerse.  Se siente débil.  Pierde el conocimiento (se desmaya).  Tiene escalofríos.  Tiene fatuma pérdida de líquido por la vagina.  Tiene fatuma pérdida de líquido abundante por la vagina.  Resumen  A veces, la hemorragia vaginal earl el embarazo es normal y no causa problemas. En otras ocasiones, la hemorragia puede ser fatuma señal de algo grave.  Informe a mark médico de inmediato si tiene algún tipo de hemorragia vaginal.  Siga las indicaciones de mark médico con respecto al reggie de actividad que puede realizar. [place] necesite que alguien la ayude con las actividades habituales.  Esta información no tiene kain fin reemplazar el consejo del médico. Asegúrese de hacerle al médico cualquier pregunta que tenga.

## 2019-08-04 NOTE — ED ADULT NURSE NOTE - NSIMPLEMENTINTERV_GEN_ALL_ED
Implemented All Universal Safety Interventions:  [place] to call system. Call bell, personal items and telephone within reach. Instruct patient to call for assistance. Room bathroom lighting operational. Non-slip footwear when patient is off stretcher. Physically safe environment: no spills, clutter or unnecessary equipment. Stretcher in lowest position, wheels locked, appropriate side rails in place.

## 2019-08-04 NOTE — ED PROVIDER NOTE - CARE PLAN
Assessment and plan of treatment:	Plan: Labs, ivf, urine, pelvic ultrasound, reassess. Principal Discharge DX:	Vaginal bleeding  Assessment and plan of treatment:	Plan: Labs, ivf, urine, pelvic ultrasound, reassess.  Secondary Diagnosis:	Pregnancy

## 2019-08-04 NOTE — ED PROVIDER NOTE - CLINICAL SUMMARY MEDICAL DECISION MAKING FREE TEXT BOX
pt aware of all labs and imaging with copy provided, understands bleeding precautions with strict return precautions given, aware of rest, signs and symptoms to return for, follow up with her ob-gyn in 1-2 days.

## 2019-08-04 NOTE — ED PROVIDER NOTE - NSFOLLOWUPCLINICS_GEN_ALL_ED_FT
[facility]  OB/GYN  [address] [place] [address]  Phone: [phone]  Fax:   Follow Up Time: 1-3 Days

## 2019-08-04 NOTE — ED PROVIDER NOTE - GENITOURINARY, MLM
Chaperone Dr. ROGELIO Hewitt, speculum: No discharge, lesions, there is blood in the vaginal canal, unable to visualize os secondary to blood. Bimanual: closed os, no CMT, no adnexal tenderness

## 2019-08-04 NOTE — ED PROVIDER NOTE - OBJECTIVE STATEMENT
28 yo F  presents for evaluation of vaginal bleeding, onset 3 days ago, associated with abdominal cramping. Described as spotting at first and then more blood now no clots. No fever, no chills, no headache, no n/v/d, no chest pain, no back pain. Pt states that she was told she was 5 weeks pregnant. LMP May 26

## 2019-08-05 VITALS
SYSTOLIC BLOOD PRESSURE: 111 MMHG | DIASTOLIC BLOOD PRESSURE: 57 MMHG | TEMPERATURE: 99 F | OXYGEN SATURATION: 100 % | HEART RATE: 63 BPM | RESPIRATION RATE: 18 BRPM

## 2019-08-05 PROCEDURE — 76856 US EXAM PELVIC COMPLETE: CPT | Mod: 26

## 2019-08-05 RX ORDER — AMOXICILLIN 250 MG/5ML
1 SUSPENSION, RECONSTITUTED, ORAL (ML) ORAL
Qty: 0 | Refills: 0 | DISCHARGE

## 2019-08-06 LAB
CULTURE RESULTS: SIGNIFICANT CHANGE UP
SPECIMEN SOURCE: SIGNIFICANT CHANGE UP

## 2019-08-08 ENCOUNTER — RESULT CHARGE (OUTPATIENT)
Age: 29
End: 2019-08-08

## 2019-08-08 ENCOUNTER — APPOINTMENT (OUTPATIENT)
Dept: OBGYN | Facility: CLINIC | Age: 29
End: 2019-08-08
Payer: MEDICAID

## 2019-08-08 ENCOUNTER — OUTPATIENT (OUTPATIENT)
Dept: OUTPATIENT SERVICES | Facility: HOSPITAL | Age: 29
LOS: 1 days | Discharge: HOME | End: 2019-08-08

## 2019-08-08 VITALS
HEIGHT: 64 IN | BODY MASS INDEX: 23.73 KG/M2 | DIASTOLIC BLOOD PRESSURE: 70 MMHG | SYSTOLIC BLOOD PRESSURE: 98 MMHG | WEIGHT: 139 LBS

## 2019-08-08 DIAGNOSIS — Z78.9 OTHER SPECIFIED HEALTH STATUS: ICD-10-CM

## 2019-08-08 PROCEDURE — 76816 OB US FOLLOW-UP PER FETUS: CPT | Mod: 26

## 2019-08-08 PROCEDURE — 99204 OFFICE O/P NEW MOD 45 MIN: CPT | Mod: 25

## 2019-08-08 RX ORDER — CALCIUM CARBONATE 300MG(750)
TABLET,CHEWABLE ORAL
Refills: 0 | Status: ACTIVE | COMMUNITY

## 2019-08-08 NOTE — PROCEDURE
[Intrauterine Pregnancy] : intrauterine pregnancy [Yolk Sac] : yolk sac present [Fetal Heart] : no fetal heart

## 2019-08-08 NOTE — PHYSICAL EXAM
[Awake] : awake [Alert] : alert [Acute Distress] : no acute distress [Mass] : no breast mass [Nipple Discharge] : no nipple discharge [Axillary LAD] : no axillary lymphadenopathy [Soft] : soft [Tender] : non tender [Oriented x3] : oriented to person, place, and time [Normal] : uterus [Pap Obtained] : a Pap smear was performed [Scant] : there was scant vaginal bleeding [Dilated] : the cervix was not dilated [Tenderness] : nontender [Motion Tenderness] : there was no cervical motion tenderness [Enlarged ___ wks] : not enlarged [Uterine Adnexae] : were not tender and not enlarged [Adnexa Tenderness] : were not tender [Ovarian Mass (___ Cm)] : there were no adnexal masses

## 2019-08-09 ENCOUNTER — EMERGENCY (EMERGENCY)
Facility: HOSPITAL | Age: 29
LOS: 0 days | Discharge: HOME | End: 2019-08-09
Attending: EMERGENCY MEDICINE | Admitting: EMERGENCY MEDICINE
Payer: MEDICAID

## 2019-08-09 VITALS
DIASTOLIC BLOOD PRESSURE: 76 MMHG | RESPIRATION RATE: 21 BRPM | TEMPERATURE: 98 F | WEIGHT: 138.89 LBS | SYSTOLIC BLOOD PRESSURE: 145 MMHG | HEART RATE: 106 BPM | OXYGEN SATURATION: 97 %

## 2019-08-09 DIAGNOSIS — R06.02 SHORTNESS OF BREATH: ICD-10-CM

## 2019-08-09 DIAGNOSIS — Z34.90 ENCOUNTER FOR SUPERVISION OF NORMAL PREGNANCY, UNSPECIFIED, UNSPECIFIED TRIMESTER: ICD-10-CM

## 2019-08-09 DIAGNOSIS — Z3A.01 LESS THAN 8 WEEKS GESTATION OF PREGNANCY: ICD-10-CM

## 2019-08-09 DIAGNOSIS — R07.89 OTHER CHEST PAIN: ICD-10-CM

## 2019-08-09 DIAGNOSIS — R05 COUGH: ICD-10-CM

## 2019-08-09 DIAGNOSIS — O99.89 OTHER SPECIFIED DISEASES AND CONDITIONS COMPLICATING PREGNANCY, CHILDBIRTH AND THE PUERPERIUM: ICD-10-CM

## 2019-08-09 LAB
ALBUMIN SERPL ELPH-MCNC: 4.5 G/DL — SIGNIFICANT CHANGE UP (ref 3.5–5.2)
ALP SERPL-CCNC: 76 U/L — SIGNIFICANT CHANGE UP (ref 30–115)
ALT FLD-CCNC: 21 U/L — SIGNIFICANT CHANGE UP (ref 0–41)
ANION GAP SERPL CALC-SCNC: 15 MMOL/L — HIGH (ref 7–14)
APPEARANCE UR: CLEAR — SIGNIFICANT CHANGE UP
AST SERPL-CCNC: 20 U/L — SIGNIFICANT CHANGE UP (ref 0–41)
BACTERIA # UR AUTO: NEGATIVE — SIGNIFICANT CHANGE UP
BASOPHILS # BLD AUTO: 0.02 K/UL — SIGNIFICANT CHANGE UP (ref 0–0.2)
BASOPHILS NFR BLD AUTO: 0.2 % — SIGNIFICANT CHANGE UP (ref 0–1)
BILIRUB SERPL-MCNC: 0.5 MG/DL — SIGNIFICANT CHANGE UP (ref 0.2–1.2)
BILIRUB UR QL STRIP: NORMAL
BILIRUB UR-MCNC: NEGATIVE — SIGNIFICANT CHANGE UP
BUN SERPL-MCNC: 5 MG/DL — LOW (ref 10–20)
CALCIUM SERPL-MCNC: 9.6 MG/DL — SIGNIFICANT CHANGE UP (ref 8.5–10.1)
CHLORIDE SERPL-SCNC: 103 MMOL/L — SIGNIFICANT CHANGE UP (ref 98–110)
CLARITY UR: CLEAR
CO2 SERPL-SCNC: 21 MMOL/L — SIGNIFICANT CHANGE UP (ref 17–32)
COLLECTION METHOD: NORMAL
COLOR SPEC: SIGNIFICANT CHANGE UP
CREAT SERPL-MCNC: 0.6 MG/DL — LOW (ref 0.7–1.5)
DIFF PNL FLD: ABNORMAL
EOSINOPHIL # BLD AUTO: 0.74 K/UL — HIGH (ref 0–0.7)
EOSINOPHIL NFR BLD AUTO: 8 % — SIGNIFICANT CHANGE UP (ref 0–8)
EPI CELLS # UR: 1 /HPF — SIGNIFICANT CHANGE UP (ref 0–5)
GLUCOSE SERPL-MCNC: 109 MG/DL — HIGH (ref 70–99)
GLUCOSE UR QL: NEGATIVE — SIGNIFICANT CHANGE UP
GLUCOSE UR-MCNC: NORMAL
HCG SERPL-ACNC: 2961 MIU/ML — HIGH
HCG UR QL: 0.2 EU/DL
HCG UR QL: POSITIVE
HCT VFR BLD CALC: 34 % — LOW (ref 37–47)
HGB BLD-MCNC: 11.4 G/DL — LOW (ref 12–16)
HGB UR QL STRIP.AUTO: NORMAL
HYALINE CASTS # UR AUTO: 1 /LPF — SIGNIFICANT CHANGE UP (ref 0–7)
IMM GRANULOCYTES NFR BLD AUTO: 0.2 % — SIGNIFICANT CHANGE UP (ref 0.1–0.3)
KETONES UR-MCNC: NORMAL
KETONES UR-MCNC: SIGNIFICANT CHANGE UP
LEUKOCYTE ESTERASE UR QL STRIP: NORMAL
LEUKOCYTE ESTERASE UR-ACNC: NEGATIVE — SIGNIFICANT CHANGE UP
LYMPHOCYTES # BLD AUTO: 0.93 K/UL — LOW (ref 1.2–3.4)
LYMPHOCYTES # BLD AUTO: 10 % — LOW (ref 20.5–51.1)
MCHC RBC-ENTMCNC: 27.7 PG — SIGNIFICANT CHANGE UP (ref 27–31)
MCHC RBC-ENTMCNC: 33.5 G/DL — SIGNIFICANT CHANGE UP (ref 32–37)
MCV RBC AUTO: 82.5 FL — SIGNIFICANT CHANGE UP (ref 81–99)
MONOCYTES # BLD AUTO: 0.73 K/UL — HIGH (ref 0.1–0.6)
MONOCYTES NFR BLD AUTO: 7.9 % — SIGNIFICANT CHANGE UP (ref 1.7–9.3)
NEUTROPHILS # BLD AUTO: 6.85 K/UL — HIGH (ref 1.4–6.5)
NEUTROPHILS NFR BLD AUTO: 73.7 % — SIGNIFICANT CHANGE UP (ref 42.2–75.2)
NITRITE UR QL STRIP: NORMAL
NITRITE UR-MCNC: NEGATIVE — SIGNIFICANT CHANGE UP
NRBC # BLD: 0 /100 WBCS — SIGNIFICANT CHANGE UP (ref 0–0)
PH UR STRIP: 5
PH UR: 6.5 — SIGNIFICANT CHANGE UP (ref 5–8)
PLATELET # BLD AUTO: 206 K/UL — SIGNIFICANT CHANGE UP (ref 130–400)
POTASSIUM SERPL-MCNC: 4.1 MMOL/L — SIGNIFICANT CHANGE UP (ref 3.5–5)
POTASSIUM SERPL-SCNC: 4.1 MMOL/L — SIGNIFICANT CHANGE UP (ref 3.5–5)
PROT SERPL-MCNC: 7.7 G/DL — SIGNIFICANT CHANGE UP (ref 6–8)
PROT UR STRIP-MCNC: NORMAL
PROT UR-MCNC: SIGNIFICANT CHANGE UP
QUALITY CONTROL: YES
RBC # BLD: 4.12 M/UL — LOW (ref 4.2–5.4)
RBC # FLD: 16.2 % — HIGH (ref 11.5–14.5)
RBC CASTS # UR COMP ASSIST: 153 /HPF — HIGH (ref 0–4)
SODIUM SERPL-SCNC: 139 MMOL/L — SIGNIFICANT CHANGE UP (ref 135–146)
SP GR SPEC: 1.02 — SIGNIFICANT CHANGE UP (ref 1.01–1.02)
SP GR UR STRIP: 1.02
UROBILINOGEN FLD QL: SIGNIFICANT CHANGE UP
WBC # BLD: 9.29 K/UL — SIGNIFICANT CHANGE UP (ref 4.8–10.8)
WBC # FLD AUTO: 9.29 K/UL — SIGNIFICANT CHANGE UP (ref 4.8–10.8)
WBC UR QL: 2 /HPF — SIGNIFICANT CHANGE UP (ref 0–5)

## 2019-08-09 PROCEDURE — 99285 EMERGENCY DEPT VISIT HI MDM: CPT

## 2019-08-09 PROCEDURE — 71045 X-RAY EXAM CHEST 1 VIEW: CPT | Mod: 26

## 2019-08-09 PROCEDURE — 93010 ELECTROCARDIOGRAM REPORT: CPT

## 2019-08-09 RX ORDER — ALBUTEROL 90 UG/1
2.5 AEROSOL, METERED ORAL ONCE
Refills: 0 | Status: DISCONTINUED | OUTPATIENT
Start: 2019-08-09 | End: 2019-08-09

## 2019-08-09 RX ORDER — ALBUTEROL 90 UG/1
2.5 AEROSOL, METERED ORAL ONCE
Refills: 0 | Status: COMPLETED | OUTPATIENT
Start: 2019-08-09 | End: 2019-08-09

## 2019-08-09 RX ORDER — ALBUTEROL 90 UG/1
1 AEROSOL, METERED ORAL ONCE
Refills: 0 | Status: DISCONTINUED | OUTPATIENT
Start: 2019-08-09 | End: 2019-08-09

## 2019-08-09 RX ORDER — ACETAMINOPHEN 500 MG
975 TABLET ORAL ONCE
Refills: 0 | Status: DISCONTINUED | OUTPATIENT
Start: 2019-08-09 | End: 2019-08-09

## 2019-08-09 RX ORDER — SODIUM CHLORIDE 9 MG/ML
1000 INJECTION, SOLUTION INTRAVENOUS ONCE
Refills: 0 | Status: COMPLETED | OUTPATIENT
Start: 2019-08-09 | End: 2019-08-09

## 2019-08-09 RX ADMIN — SODIUM CHLORIDE 1000 MILLILITER(S): 9 INJECTION, SOLUTION INTRAVENOUS at 10:12

## 2019-08-09 RX ADMIN — ALBUTEROL 2.5 MILLIGRAM(S): 90 AEROSOL, METERED ORAL at 11:01

## 2019-08-09 RX ADMIN — SODIUM CHLORIDE 1000 MILLILITER(S): 9 INJECTION, SOLUTION INTRAVENOUS at 08:56

## 2019-08-09 RX ADMIN — ALBUTEROL 2.5 MILLIGRAM(S): 90 AEROSOL, METERED ORAL at 09:02

## 2019-08-09 RX ADMIN — ALBUTEROL 2.5 MILLIGRAM(S): 90 AEROSOL, METERED ORAL at 10:40

## 2019-08-09 NOTE — ED PROVIDER NOTE - CARE PROVIDER_API CALL
Lee Conn)  Critical Care Medicine; Internal Medicine; Pulmonary Disease; Sleep Medicine  [address] [place] [address]  Phone: [phone]  Fax: [phone]  Follow Up Time: 1-3 Days

## 2019-08-09 NOTE — ED ADULT NURSE NOTE - CHPI ED NUR SYMPTOMS NEG
no diaphoresis/no headache/no hemoptysis/no edema/no shortness of breath/no body aches/no wheezing/no chills/no cough/no fever

## 2019-08-09 NOTE — ED ADULT NURSE NOTE - NSIMPLEMENTINTERV_GEN_ALL_ED
Implemented All Universal Safety Interventions:  [place] to call system. Call bell, personal items and telephone within reach. Instruct patient to call for assistance. Room bathroom lighting operational. Non-slip footwear when patient is off stretcher. Physically safe environment: no spills, clutter or unnecessary equipment. Stretcher in lowest position, wheels locked, appropriate side rails in place.

## 2019-08-09 NOTE — ED PROVIDER NOTE - NS_EDPROVIDERDISPOUSERTYPE_ED_A_ED
Medical Students Attestation (For Medical Student USE Only)... Scribe Attestation (For Scribes USE Only).../Medical Students Attestation (For Medical Student USE Only)... Attending Attestation (For Attendings USE Only).../Medical Students Attestation (For Medical Student USE Only).../Scribe Attestation (For Scribes USE Only)...

## 2019-08-09 NOTE — ED PROVIDER NOTE - PROGRESS NOTE DETAILS
CATE: 29y F , 6 weeks pregnant by US presents with SOB since yesterday. States that she has been having URI symptoms with cough and nasal congestion for 3 days. Starting yesterday, states has been having increased SOB. Denies fever, chills, n/v/d, abd pain, or numbness/tingling. ATTENDING NOTE:   28 y/o F approximately 6 weeks pregnant presents to ED for evaluation of SOB, rhinorrhea, cough x3 days. Found to have tachypnea with b/l wheeze and prolonged expiratory phase. Will treat as bronchitis. Pt states this is typical of her prior URI’s. Nebs, O2 PRN and reassess. Pt feels much improved. Repeat lung exams reveal CTAB no w/r/r.

## 2019-08-09 NOTE — ED ADULT NURSE REASSESSMENT NOTE - NS ED NURSE REASSESS COMMENT FT1
patient assessed, patient complains of shortness of breath. Labs drawn pending results, Albuterol nebulizer treatment given. IVF given as per MD orders. Vital signs stable, will continue to monitor. patient assessed, patient complains of shortness of breath. Labs drawn pending results, Albuterol nebulizer treatment given. IVF given as per MD orders.  Awaiting chest x ray. Vital signs stable, will continue to monitor.

## 2019-08-09 NOTE — ED PROVIDER NOTE - CLINICAL SUMMARY MEDICAL DECISION MAKING FREE TEXT BOX
Improved. Ambulating without difficulty. Feels and appears well. No complaints at present. Eager to leave. Stable for discharge. Patient was given strict return and follow up precautions. The patient has been informed of all concerning signs and symptoms to return to Emergency Department, the necessity to follow up with PMD/Clinic/follow up provided within 2-3 days was explained, and the patient reports understanding of above with capacity and insight.

## 2019-08-09 NOTE — ED PROVIDER NOTE - CCCP TRG CHIEF CMPLNT
Name: Mane Dodd [facility] Number: [id number]  Date of Treatment: 10/30/2018   Diagnosis:   Encounter Diagnosis   Name Primary?    Shoulder pain, unspecified chronicity, unspecified laterality        Time in: 1303  Time Out: 1410  Total Treatment Time: 67    Subjective:    Mane reports felt so good on Saturday that he was raising his R UE overhead and showing friends his improvement. After a few reps, pt stated his pain increased and is now a 5/10. .    Objective:    Patient received individual therapy to increase strength, endurance, ROM, flexibility, posture and core stabilization with activities as follows:     Mane received therapeutic exercises to develop strength, endurance, ROM, flexibility, posture and core stabilization for 40 minutes including:     Seated UBE 4' fwd  Seated OH pulley scaption 5'  Standing R shoulder isometrics with ball 10/3: flex, aBd 10/3  Standing finger ladder R shoulder flexion 10 x  Seated scap retraction 10/3 B  Seated shoulder shrugs 10/3 B  Seated retro shoulder rolls B 10/3  Supine serratus punches R 10/3 B  Supine PROM R shoulder and rhythmic gentle stabilization at 90* flexion     CP applied to the R shoulder x 10 minutes in seated position after being cleared for contraindications.    Continue HEP daily.    Pt demo good understanding of the education provided. Patient demonstrated good return demonstration of activities.     Assessment:     Improved tolerance for isometric aBd with cues for decreased contractions. Increased pain with attempts at supine AAROM R shoulder flexion which improved after CP.     Pt will continue to benefit from skilled PT intervention. Medical Necessity is demonstrated by:  Requires skilled supervision to complete and progress HEP and Weakness.    Patient is making good progress towards established goals.    Plan:    Continue with established Plan of Care towards PT goals.      shortness of breath

## 2019-08-09 NOTE — ED PROVIDER NOTE - NS ED ROS FT
Eyes:  No visual changes, eye pain or discharge.  ENMT:  No hearing changes, pain, no difficulty swallowing + sore throat, runny nose  Cardiac:  No chest pain, edema. No chest pain with exertion. + SOB  Respiratory:  No respiratory distress. No hemoptysis. No history of asthma or RAD. + cough  GI:  No nausea, vomiting, diarrhea or abdominal pain.  :  No dysuria, frequency or burning.  MS:  No myalgia, muscle weakness, joint pain or back pain.  Neuro:  No headache or weakness.  No LOC.  Skin:  No skin rash.   Endocrine: No history of thyroid disease or diabetes.

## 2019-08-09 NOTE — ED PROVIDER NOTE - OBJECTIVE STATEMENT
28 y/o F , currently 6 weeks and 3 days pregnant presents with continuous shortness of breath for 1 day. Pt states she has had a cold for 3 days with non-productive cough, congestion and runny nose. Yesterday afternoon she began having shortness of breath associated with chest pain during inhalation. She states she usually has shortness of breath whenever she gets sick which resolves on its own. However, this episode was worse than her previous episodes. She denies any fever, chills, n/v/d, abdominal pain, and changes in urinary symptoms or bowel movements. 28 y/o F , currently 6 weeks and 3 days pregnant presents with continuous shortness of breath for 1 day. Pt states she has had a cold for 3 days with non-productive cough, congestion and runny nose. Yesterday afternoon she began having shortness of breath associated with chest pain during inhalation. She states shortness of breath usually occurs when she gets sick. It usually resolves on its own but this episode was worse than her previous episodes which prompted her to come in. She denies any fever, chills, throat pain. n/v/d, abdominal pain, changes in urinary symptoms or bowel movements and muscle pain.

## 2019-08-09 NOTE — ED ADULT NURSE NOTE - EENT WDL
DISPLAY PLAN FREE TEXT
Eyes with no visual disturbances.  Ears clean and dry and no hearing difficulties. Nose with pink mucosa and no drainage.  Mouth mucous membranes moist and pink.  No tenderness or swelling to throat or neck.

## 2019-08-09 NOTE — ED PROVIDER NOTE - PHYSICAL EXAMINATION
Gen: NAD, shortness of breath, AAOx3  HEENT: No discharge or redness of eyes, no nasal discharge, pharynx is non-erythematous, no tender or swollen lymph nodes  CVS: RRR. no murmurs, gallops or rubs. Pulses 2+ throughout  Pulm: Diffuse b/l wheezing, no rhonchi or crackles  Abd: Soft, NTND  MSK: No clubbing, cyanosis or edema  Skin: No rashes or lesions CONSTITUTIONAL: Well-developed; well-nourished; in no acute distress.   SKIN: warm, dry  HEAD: Normocephalic; atraumatic.  EYES: PERRL, EOMI, no conjunctival erythema  ENT: No nasal discharge; airway clear.  NECK: Supple; non tender.  CARD: S1, S2 normal; no murmurs, gallops, or rubs. Regular rate and rhythm.   RESP: B/L inspiratory and expiratory wheeze B/L.  EXT: Normal ROM.  No clubbing, cyanosis or edema.   LYMPH: No acute cervical adenopathy.  NEURO: Alert, oriented, grossly unremarkable  PSYCH: Cooperative, appropriate.

## 2019-08-09 NOTE — ED PROVIDER NOTE - PMH
No pertinent past medical history    No pertinent past medical history Statement Selected <<----- Click to add NO pertinent Past Medical History

## 2019-08-09 NOTE — ED ADULT NURSE NOTE - OBJECTIVE STATEMENT
The patient is a 29y Female complaining of shortness of breath x 1 day associated with chest pressure worsening when taking in a deep breath radiating to back. As per patient shortness of breath is at rest and worsens with activity. Patient is 7 weeks pregnant and also reports mild vaginal bleeding. Patient denies any abdominal pain, nausea, vomiting, recent fever or chills.

## 2019-08-09 NOTE — ED PROVIDER NOTE - ATTENDING CONTRIBUTION TO CARE
ATTENDING NOTE:   28 y/o F approximately 6 weeks pregnant presents to ED for evaluation of SOB, rhinorrhea, cough x3 days. Found to have tachypnea with b/l wheeze and prolonged expiratory phase. Will treat as bronchitis. Pt states this is typical of her prior URI’s. Nebs, O2 PRN and reassess.

## 2019-08-11 ENCOUNTER — EMERGENCY (EMERGENCY)
Facility: HOSPITAL | Age: 29
LOS: 0 days | Discharge: HOME | End: 2019-08-12
Attending: EMERGENCY MEDICINE | Admitting: EMERGENCY MEDICINE
Payer: MEDICAID

## 2019-08-11 ENCOUNTER — RESULT REVIEW (OUTPATIENT)
Age: 29
End: 2019-08-11

## 2019-08-11 VITALS
WEIGHT: 138.89 LBS | HEART RATE: 101 BPM | DIASTOLIC BLOOD PRESSURE: 74 MMHG | RESPIRATION RATE: 20 BRPM | SYSTOLIC BLOOD PRESSURE: 114 MMHG | TEMPERATURE: 99 F | OXYGEN SATURATION: 98 %

## 2019-08-11 VITALS — WEIGHT: 138.89 LBS | HEIGHT: 64 IN

## 2019-08-11 DIAGNOSIS — O03.9 COMPLETE OR UNSPECIFIED SPONTANEOUS ABORTION WITHOUT COMPLICATION: ICD-10-CM

## 2019-08-11 DIAGNOSIS — O20.9 HEMORRHAGE IN EARLY PREGNANCY, UNSPECIFIED: ICD-10-CM

## 2019-08-11 LAB
ALBUMIN SERPL ELPH-MCNC: 4.9 G/DL — SIGNIFICANT CHANGE UP (ref 3.5–5.2)
ALP SERPL-CCNC: 75 U/L — SIGNIFICANT CHANGE UP (ref 30–115)
ALT FLD-CCNC: 26 U/L — SIGNIFICANT CHANGE UP (ref 0–41)
ANION GAP SERPL CALC-SCNC: 17 MMOL/L — HIGH (ref 7–14)
APPEARANCE UR: ABNORMAL
AST SERPL-CCNC: 20 U/L — SIGNIFICANT CHANGE UP (ref 0–41)
BASOPHILS # BLD AUTO: 0.03 K/UL — SIGNIFICANT CHANGE UP (ref 0–0.2)
BASOPHILS NFR BLD AUTO: 0.3 % — SIGNIFICANT CHANGE UP (ref 0–1)
BILIRUB SERPL-MCNC: 0.3 MG/DL — SIGNIFICANT CHANGE UP (ref 0.2–1.2)
BILIRUB UR-MCNC: ABNORMAL
BUN SERPL-MCNC: 5 MG/DL — LOW (ref 10–20)
CALCIUM SERPL-MCNC: 9.9 MG/DL — SIGNIFICANT CHANGE UP (ref 8.5–10.1)
CHLORIDE SERPL-SCNC: 99 MMOL/L — SIGNIFICANT CHANGE UP (ref 98–110)
CO2 SERPL-SCNC: 21 MMOL/L — SIGNIFICANT CHANGE UP (ref 17–32)
COLOR SPEC: ABNORMAL
CREAT SERPL-MCNC: 0.6 MG/DL — LOW (ref 0.7–1.5)
DIFF PNL FLD: ABNORMAL
EOSINOPHIL # BLD AUTO: 0.81 K/UL — HIGH (ref 0–0.7)
EOSINOPHIL NFR BLD AUTO: 7.4 % — SIGNIFICANT CHANGE UP (ref 0–8)
GLUCOSE SERPL-MCNC: 88 MG/DL — SIGNIFICANT CHANGE UP (ref 70–99)
GLUCOSE UR QL: NEGATIVE MG/DL — SIGNIFICANT CHANGE UP
HCG SERPL-ACNC: 1740 MIU/ML — HIGH
HCT VFR BLD CALC: 35.8 % — LOW (ref 37–47)
HGB BLD-MCNC: 11.9 G/DL — LOW (ref 12–16)
IMM GRANULOCYTES NFR BLD AUTO: 0.3 % — SIGNIFICANT CHANGE UP (ref 0.1–0.3)
KETONES UR-MCNC: >=80
LEUKOCYTE ESTERASE UR-ACNC: ABNORMAL
LYMPHOCYTES # BLD AUTO: 19.4 % — LOW (ref 20.5–51.1)
LYMPHOCYTES # BLD AUTO: 2.13 K/UL — SIGNIFICANT CHANGE UP (ref 1.2–3.4)
MCHC RBC-ENTMCNC: 27.1 PG — SIGNIFICANT CHANGE UP (ref 27–31)
MCHC RBC-ENTMCNC: 33.2 G/DL — SIGNIFICANT CHANGE UP (ref 32–37)
MCV RBC AUTO: 81.5 FL — SIGNIFICANT CHANGE UP (ref 81–99)
MONOCYTES # BLD AUTO: 0.95 K/UL — HIGH (ref 0.1–0.6)
MONOCYTES NFR BLD AUTO: 8.6 % — SIGNIFICANT CHANGE UP (ref 1.7–9.3)
NEUTROPHILS # BLD AUTO: 7.05 K/UL — HIGH (ref 1.4–6.5)
NEUTROPHILS NFR BLD AUTO: 64 % — SIGNIFICANT CHANGE UP (ref 42.2–75.2)
NITRITE UR-MCNC: POSITIVE
NRBC # BLD: 0 /100 WBCS — SIGNIFICANT CHANGE UP (ref 0–0)
PH UR: 7 — SIGNIFICANT CHANGE UP (ref 5–8)
PLATELET # BLD AUTO: 239 K/UL — SIGNIFICANT CHANGE UP (ref 130–400)
POTASSIUM SERPL-MCNC: 4.1 MMOL/L — SIGNIFICANT CHANGE UP (ref 3.5–5)
POTASSIUM SERPL-SCNC: 4.1 MMOL/L — SIGNIFICANT CHANGE UP (ref 3.5–5)
PROT SERPL-MCNC: 8.1 G/DL — HIGH (ref 6–8)
PROT UR-MCNC: 100 MG/DL
RBC # BLD: 4.39 M/UL — SIGNIFICANT CHANGE UP (ref 4.2–5.4)
RBC # FLD: 16.6 % — HIGH (ref 11.5–14.5)
RBC CASTS # UR COMP ASSIST: >50 /HPF
SODIUM SERPL-SCNC: 137 MMOL/L — SIGNIFICANT CHANGE UP (ref 135–146)
SP GR SPEC: 1.01 — SIGNIFICANT CHANGE UP (ref 1.01–1.03)
UROBILINOGEN FLD QL: 1 MG/DL (ref 0.2–0.2)
WBC # BLD: 11 K/UL — HIGH (ref 4.8–10.8)
WBC # FLD AUTO: 11 K/UL — HIGH (ref 4.8–10.8)
WBC UR QL: SIGNIFICANT CHANGE UP /HPF

## 2019-08-11 PROCEDURE — 99285 EMERGENCY DEPT VISIT HI MDM: CPT

## 2019-08-11 PROCEDURE — 76856 US EXAM PELVIC COMPLETE: CPT | Mod: 26

## 2019-08-11 PROCEDURE — 88304 TISSUE EXAM BY PATHOLOGIST: CPT | Mod: 26

## 2019-08-11 RX ORDER — CEFTRIAXONE 500 MG/1
1000 INJECTION, POWDER, FOR SOLUTION INTRAMUSCULAR; INTRAVENOUS ONCE
Refills: 0 | Status: COMPLETED | OUTPATIENT
Start: 2019-08-11 | End: 2019-08-11

## 2019-08-11 RX ORDER — MORPHINE SULFATE 50 MG/1
4 CAPSULE, EXTENDED RELEASE ORAL ONCE
Refills: 0 | Status: DISCONTINUED | OUTPATIENT
Start: 2019-08-11 | End: 2019-08-11

## 2019-08-11 RX ORDER — SODIUM CHLORIDE 9 MG/ML
1000 INJECTION INTRAMUSCULAR; INTRAVENOUS; SUBCUTANEOUS ONCE
Refills: 0 | Status: COMPLETED | OUTPATIENT
Start: 2019-08-11 | End: 2019-08-11

## 2019-08-11 RX ADMIN — MORPHINE SULFATE 4 MILLIGRAM(S): 50 CAPSULE, EXTENDED RELEASE ORAL at 20:08

## 2019-08-11 RX ADMIN — SODIUM CHLORIDE 1000 MILLILITER(S): 9 INJECTION INTRAMUSCULAR; INTRAVENOUS; SUBCUTANEOUS at 21:23

## 2019-08-11 RX ADMIN — CEFTRIAXONE 100 MILLIGRAM(S): 500 INJECTION, POWDER, FOR SOLUTION INTRAMUSCULAR; INTRAVENOUS at 22:11

## 2019-08-11 RX ADMIN — MORPHINE SULFATE 4 MILLIGRAM(S): 50 CAPSULE, EXTENDED RELEASE ORAL at 21:22

## 2019-08-11 RX ADMIN — MORPHINE SULFATE 4 MILLIGRAM(S): 50 CAPSULE, EXTENDED RELEASE ORAL at 22:20

## 2019-08-11 RX ADMIN — SODIUM CHLORIDE 2000 MILLILITER(S): 9 INJECTION INTRAMUSCULAR; INTRAVENOUS; SUBCUTANEOUS at 20:08

## 2019-08-11 NOTE — ED PROVIDER NOTE - NSFOLLOWUPINSTRUCTIONS_ED_ALL_ED_FT
PLEASE FOLLOW UP WITH OBGYN IN 1 WEEK.      Miscarriage    A miscarriage is the sudden loss of an unborn baby (fetus) before the 20th week of pregnancy. Most miscarriages happen in the first 3 months of pregnancy. Sometimes, it happens before a woman even knows she is pregnant. A miscarriage is also called a "spontaneous miscarriage" or "early pregnancy loss." Having a miscarriage can be an emotional experience. Talk with your caregiver about any questions you may have about miscarrying, the grieving process, and your future pregnancy plans.    CAUSES  Problems with the fetal chromosomes that make it impossible for the baby to develop normally. Problems with the baby's genes or chromosomes are most often the result of errors that occur, by chance, as the embryo divides and grows. The problems are not inherited from the parents.  Infection of the cervix or uterus.    Hormone problems.     Problems with the cervix, such as having an incompetent cervix. This is when the tissue in the cervix is not strong enough to hold the pregnancy.    Problems with the uterus, such as an abnormally shaped uterus, uterine fibroids, or congenital abnormalities.    Certain medical conditions.    Smoking, drinking alcohol, or taking illegal drugs.    Trauma.       Often, the cause of a miscarriage is unknown.     SYMPTOMS  Vaginal bleeding or spotting, with or without cramps or pain.  Pain or cramping in the abdomen or lower back.  Passing fluid, tissue, or blood clots from the vagina.     DIAGNOSIS  Your caregiver will perform a physical exam. You may also have an ultrasound to confirm the miscarriage. Blood or urine tests may also be ordered.    TREATMENT  Sometimes, treatment is not necessary if you naturally pass all the fetal tissue that was in the uterus. If some of the fetus or placenta remains in the body (incomplete miscarriage), tissue left behind may become infected and must be removed. Usually, a dilation and curettage (D and C) procedure is performed. During a D and C procedure, the cervix is widened (dilated) and any remaining fetal or placental tissue is gently removed from the uterus.   Antibiotic medicines are prescribed if there is an infection. Other medicines may be given to reduce the size of the uterus (contract) if there is a lot of bleeding.   If you have Rh negative blood and your baby was Rh positive, you will need a Rh immunoglobulin shot. This shot will protect any future baby from having Rh blood problems in future pregnancies.     HOME CARE INSTRUCTIONS  Your caregiver may order bed rest or may allow you to continue light activity. Resume activity as directed by your caregiver.   Have someone help with home and family responsibilities during this time.    Keep track of the number of sanitary pads you use each day and how soaked (saturated) they are. Write down this information.    Do not use tampons. Do not douche or have sexual intercourse until approved by your caregiver.    Only take over-the-counter or prescription medicines for pain or discomfort as directed by your caregiver.    Do not take aspirin. Aspirin can cause bleeding.    Keep all follow-up appointments with your caregiver.    If you or your partner have problems with grieving, talk to your caregiver or seek counseling to help cope with the pregnancy loss. Allow enough time to grieve before trying to get pregnant again.       SEEK IMMEDIATE MEDICAL CARE IF:  You have severe cramps or pain in your back or abdomen.  You have a fever.  You pass large blood clots (walnut-sized or larger) or tissue from your vagina. Save any tissue for your caregiver to inspect.     Your bleeding increases.    You have a thick, bad-smelling vaginal discharge.  You become lightheaded, weak, or you faint.    You have chills.      MAKE SURE YOU:  Understand these instructions.  Will watch your condition.  Will get help right away if you are not doing well or get worse.    ADDITIONAL NOTES AND INSTRUCTIONS    Please follow up with your Primary MD in 24-48 hr.  Seek immediate medical care for any new/worsening signs or symptoms.

## 2019-08-11 NOTE — ED ADULT TRIAGE NOTE - PAIN: PRESENCE, MLM
Patient taken via transport with all personal belongings to [place] [id number] bed 1   complains of pain/discomfort

## 2019-08-11 NOTE — ED ADULT NURSE NOTE - OBJECTIVE STATEMENT
pt is 7 weeks pregnant presents with lower abd pain and vaginal bleeding started today. pt states was seen by OB on Thursday and was told there was no heart beat. pt states this is the 3rd pregnancy with no complications during previous pregnancies

## 2019-08-11 NOTE — ED PROVIDER NOTE - OBJECTIVE STATEMENT
29 y F , currently 6 weeks and 5 days pregnant presents 29 y F , 7 weeks pregnant w/ no sig PMH presents with vaginal bleeding that started earlier today. Soaked through 6 pads since symptoms onset. Developed sharp pressure pain in the suprapubic region without radiation that has steadily gotten worse. Denies fever, chills, CP, SOB, n/v/d, or numbness/tingling.

## 2019-08-11 NOTE — ED PROVIDER NOTE - NS ED ROS FT
Eyes:  No visual changes, eye pain or discharge.  ENMT:  No hearing changes, pain, no sore throat or runny nose, no difficulty swallowing  Cardiac:  No chest pain, SOB or edema. No chest pain with exertion.  Respiratory:  No cough or respiratory distress. No hemoptysis. No history of asthma or RAD.  GI:  No nausea, vomiting, diarrhea + abdominal pain.  :  No dysuria, frequency or burning.  MS:  No myalgia, muscle weakness, joint pain or back pain.  Neuro:  No headache or weakness.  No LOC.  Skin:  No skin rash.   Endocrine: No history of thyroid disease or diabetes.

## 2019-08-11 NOTE — ED ADULT NURSE REASSESSMENT NOTE - NS ED NURSE REASSESS COMMENT FT1
pt reports improvement in pain at this time. pt aware of the plan of care and has no questions or concerns . Family member at bedside. Safety maintained. Will continue to monitor

## 2019-08-11 NOTE — ED PROVIDER NOTE - NSFOLLOWUPCLINICS_GEN_ALL_ED_FT
[facility]  OB/GYN  [address] [place] [address]  Phone: [phone]  Fax:   Follow Up Time: 1-3 Days

## 2019-08-11 NOTE — ED ADULT NURSE NOTE - CHIEF COMPLAINT QUOTE
12 weeks pregnant women presents with lower abdominal cramping stating they feel like ":contractions". With bleeding x 1 week, Obgyn appointment was last week where they said they did not find a heart beat. Pt states she has gone through 3 pads of blood today.

## 2019-08-11 NOTE — ED ADULT TRIAGE NOTE - CHIEF COMPLAINT QUOTE
12 weeks pregnant woment presents with lower abdominal cramping stating they feel like ":contractions". With bleeding x 1 week, Obgyn appointment was last week where they said they did not find a heart beat. Pt states she has gone through 3 pads of blood today. 12 weeks pregnant women presents with lower abdominal cramping stating they feel like ":contractions". With bleeding x 1 week, Obgyn appointment was last week where they said they did not find a heart beat. Pt states she has gone through 3 pads of blood today.

## 2019-08-11 NOTE — ED PROVIDER NOTE - PHYSICAL EXAMINATION
CONSTITUTIONAL: In pain.  SKIN: warm, dry  HEAD: Normocephalic; atraumatic.  EYES: PERRL, EOMI, no conjunctival erythema  ENT: No nasal discharge; airway clear.  NECK: Supple; non tender.  CARD: S1, S2 normal; no murmurs, gallops, or rubs. Regular rate and rhythm.   RESP: No wheezes, rales or rhonchi.  ABD: soft tenderness to palpation in the suprapubic region.  EXT: Normal ROM.  No clubbing, cyanosis or edema.   LYMPH: No acute cervical adenopathy.  NEURO: Alert, oriented, grossly unremarkable  PSYCH: Cooperative, appropriate.

## 2019-08-11 NOTE — ED PROVIDER NOTE - PROGRESS NOTE DETAILS
CATE: 28 y/o F , currently 6 weeks and 5 days pregnant presents Discussed with OBGYN. Requests type and screen. Will come evaluate the pt Discussed with OBGYN. Was able to evacuate products of conception. Pt improved after the procedure. Requests follow up in clinic in 1 to 2 weeks.

## 2019-08-11 NOTE — ED ADULT NURSE NOTE - NSIMPLEMENTINTERV_GEN_ALL_ED
Implemented All Universal Safety Interventions:  [place] to call system. Call bell, personal items and telephone within reach. Instruct patient to call for assistance. Room bathroom lighting operational. Non-slip footwear when patient is off stretcher. Physically safe environment: no spills, clutter or unnecessary equipment. Stretcher in lowest position, wheels locked, appropriate side rails in place.

## 2019-08-12 LAB
CULTURE RESULTS: SIGNIFICANT CHANGE UP
SPECIMEN SOURCE: SIGNIFICANT CHANGE UP

## 2019-08-12 PROCEDURE — 99213 OFFICE O/P EST LOW 20 MIN: CPT

## 2019-08-12 NOTE — CONSULT NOTE ADULT - ASSESSMENT
30yo  at 7w1d GA by 1st trim sono, with likely complete , currently clinically and hemodynamically stable  - no acute GYN intervention at this time  - f/u with GYN in 2 weeks as outpatient   - tylenol/motrin PRN for pain  - bleeding precautions  - f/u pathology   - Dispo per ED  - D/w Dr. Robledo and Dr. Cleaning

## 2019-08-12 NOTE — CONSULT NOTE ADULT - SUBJECTIVE AND OBJECTIVE BOX
AMIRA MCGUIRE   29y   Female   [id number]    Chief Complaint: abdominal pain and vaginal bleeding     HPI: 30yo  at 7w1d GA by 1st trim sono, with known IUP, c/o vaginal bleeding since 0900 yesterday morning. Used 6 pads since then; 2 of which were completely soaked and passage of white tissue at 1900. Associated with menstrual-like cramping, S/P morphine 4mg x2, currently 2-3/10 intensity. Denies dizziness/lightheadedness/CP/SOB/palpitations. Denies fever, chills, nausea/vomiting, diarrhea/constipation, dysuria, urgency, frequency. Sono in ED on  showed CRL measuring 6w with no FH.     MEDICATIONS:   rocephin @1   morphine 4mg @; @       No Known Allergies      PAST MEDICAL & SURGICAL HISTORY:  No pertinent past medical history  No significant past surgical history        OB/GYN HISTORY:   LMP: 19           Cycle Length: regular                                                  Gyn: denies history of abnormal pap, STI, ovarian cysts, or uterine fibroids                                      Obstetric:   x2                                 FAMILY HISTORY:  No pertinent family history in first degree relatives      SOCIAL HISTORY: Denies cigarette use, alcohol use, or illicit drug use    Vital Signs Last 24 Hrs  T(C): 37.1 (11 Aug 2019 19:28), Max: 37.1 (11 Aug 2019 19:28)  T(F): 98.8 (11 Aug 2019 19:28), Max: 98.8 (11 Aug 2019 19:28)  HR: 101 (11 Aug 2019 19:28) (101 - 101)  BP: 114/74 (11 Aug 2019 19:28) (114/74 - 114/74)  RR: 20 (11 Aug 2019 19:28) (20 - 20)  SpO2: 98% (11 Aug 2019 19:28) (98% - 98%)    Physical Exam:  Constitutional: AAOx3, NAD  Gastrointestinal: Soft, mild lower quadrant tenderness, nondistended, no rebound, guarding, or rigidity  Pelvic:   external genitalia: normal, no lesions  vagina: 10cc dark red blood in vagina  uterus: anteverted, nontender   adnexae: no palpable masses/tenderness  cervix: tissue at os; teased out in its entirety; sent to pathology; no active bleeding per os; CMT neg; 1cm dilated  Rectal: deferred     LABS: O positive                         11.9   11. )-----------( 239      ( 11 Aug 2019 20:17 )             35.8     HCG Quantitative, Serum: 1740.0 mIU/mL (19 @ 20:17)  HCG Quantitative, Serum: 2961.0 mIU/mL (19 @ 08:47)          137  |  99  |  5<L>  ----------------------------<  88  4.1   |  21  |  0.6<L>    Ca    9.9      11 Aug 2019 20:17    TPro  8.1<H>  /  Alb  4.9  /  TBili  0.3  /  DBili  x   /  AST  20  /  ALT  26  /  AlkPhos  75        Urinalysis Basic - ( 11 Aug 2019 20:24 )    Color: Red / Appearance: Turbid / S.015 / pH: x  Gluc: x / Ketone: >=80  / Bili: Moderate / Urobili: 1.0 mg/dL   Blood: x / Protein: 100 mg/dL / Nitrite: Positive   Leuk Esterase: Small / RBC: >50 /HPF / WBC 3-5 /HPF   Sq Epi: x / Non Sq Epi: x / Bacteria: x          RADIOLOGY & ADDITIONAL STUDIES: < from: US Pelvis Complete (19 @ 22:00) >  UTERUS: Anteverted measuring 11.1 x 6.8 x 6.4 cm., with endometrial   cavity distended up to 2.3 cm with probable blood products. Within   cervix, there is a round structure resembling passing gestational sac.    Ovaries not identified on transabdominal examination.    OTHER: No significant free fluid in the pelvis.    IMPRESSION:  Endometrial cavity distended up to 2.3 cm with probable blood products.   Within cervix, there is a round structure resembling passing gestational   sac. Findings may be attributed to inevitable miscarriage and follow-up   beta hCG and pelvic ultrasound recommended.    < end of copied text >    Bedside sono: S/P vaginal exam; ES 0.88cm

## 2019-08-15 LAB
A VAGINAE DNA VAG QL NAA+PROBE: NORMAL
BVAB2 DNA VAG QL NAA+PROBE: NORMAL
C KRUSEI DNA VAG QL NAA+PROBE: NEGATIVE
C TRACH RRNA SPEC QL NAA+PROBE: NEGATIVE
MEGA1 DNA VAG QL NAA+PROBE: NORMAL
N GONORRHOEA RRNA SPEC QL NAA+PROBE: NEGATIVE
SURGICAL PATHOLOGY STUDY: SIGNIFICANT CHANGE UP
T VAGINALIS RRNA SPEC QL NAA+PROBE: NEGATIVE

## 2019-08-21 ENCOUNTER — APPOINTMENT (OUTPATIENT)
Dept: ANTEPARTUM | Facility: CLINIC | Age: 29
End: 2019-08-21

## 2019-08-22 ENCOUNTER — OUTPATIENT (OUTPATIENT)
Dept: OUTPATIENT SERVICES | Facility: HOSPITAL | Age: 29
LOS: 1 days | Discharge: HOME | End: 2019-08-22

## 2019-08-22 ENCOUNTER — APPOINTMENT (OUTPATIENT)
Dept: OBGYN | Facility: CLINIC | Age: 29
End: 2019-08-22
Payer: MEDICAID

## 2019-08-22 VITALS — WEIGHT: 138 LBS | DIASTOLIC BLOOD PRESSURE: 68 MMHG | SYSTOLIC BLOOD PRESSURE: 100 MMHG | BODY MASS INDEX: 23.69 KG/M2

## 2019-08-22 PROCEDURE — 99213 OFFICE O/P EST LOW 20 MIN: CPT

## 2019-08-22 NOTE — PHYSICAL EXAM
[Normal] : uterus [No Bleeding] : there was no active vaginal bleeding [Dilated] : the cervix was noted to be dilated [Motion Tenderness] : there was no cervical motion tenderness [Tenderness] : nontender [Uterine Adnexae] : were not tender and not enlarged [Adnexa Tenderness] : were not tender [Ovarian Mass (___ Cm)] : there were no adnexal masses [FreeTextEntry5] : 1cm dilated

## 2019-08-23 ENCOUNTER — APPOINTMENT (OUTPATIENT)
Dept: OBGYN | Facility: CLINIC | Age: 29
End: 2019-08-23

## 2019-08-26 DIAGNOSIS — O03.9 COMPLETE OR UNSPECIFIED SPONTANEOUS ABORTION WITHOUT COMPLICATION: ICD-10-CM

## 2019-08-27 ENCOUNTER — APPOINTMENT (OUTPATIENT)
Dept: ANTEPARTUM | Facility: CLINIC | Age: 29
End: 2019-08-27
Payer: MEDICAID

## 2019-08-27 ENCOUNTER — APPOINTMENT (OUTPATIENT)
Dept: GASTROENTEROLOGY | Facility: CLINIC | Age: 29
End: 2019-08-27

## 2019-08-27 ENCOUNTER — OUTPATIENT (OUTPATIENT)
Dept: OUTPATIENT SERVICES | Facility: HOSPITAL | Age: 29
LOS: 1 days | Discharge: HOME | End: 2019-08-27

## 2019-08-27 PROCEDURE — 76830 TRANSVAGINAL US NON-OB: CPT | Mod: 26

## 2019-09-05 ENCOUNTER — OUTPATIENT (OUTPATIENT)
Dept: OUTPATIENT SERVICES | Facility: HOSPITAL | Age: 29
LOS: 1 days | Discharge: HOME | End: 2019-09-05

## 2019-09-05 ENCOUNTER — APPOINTMENT (OUTPATIENT)
Dept: OBGYN | Facility: CLINIC | Age: 29
End: 2019-09-05
Payer: MEDICAID

## 2019-09-05 VITALS
SYSTOLIC BLOOD PRESSURE: 110 MMHG | HEIGHT: 64 IN | BODY MASS INDEX: 23.9 KG/M2 | WEIGHT: 140 LBS | DIASTOLIC BLOOD PRESSURE: 68 MMHG

## 2019-09-05 LAB — HCG SERPL-MCNC: 3.4 MIU/ML

## 2019-09-05 PROCEDURE — 99213 OFFICE O/P EST LOW 20 MIN: CPT

## 2019-09-06 DIAGNOSIS — O03.9 COMPLETE OR UNSPECIFIED SPONTANEOUS ABORTION WITHOUT COMPLICATION: ICD-10-CM

## 2019-09-06 DIAGNOSIS — Z30.42 ENCOUNTER FOR SURVEILLANCE OF INJECTABLE CONTRACEPTIVE: ICD-10-CM

## 2019-09-08 NOTE — PROGRESS NOTE ADULT - MINUTES
Rehab Progress Note     Interval Events (Subjective)  Burning and aching pain in the left scapular region that started after her stroke.  Pain interfered with finding a comfortable position to sleep.  No other complaints.    Objective:  VITAL SIGNS: /67   Pulse 78   Temp 37 °C (98.6 °F) (Temporal)   Resp 18   Wt 62.6 kg (138 lb)   SpO2 93%   BMI 18.72 kg/m²   Gen: Alert and cooperative, no acute distress  CV: RRR, nl S1, S2  Resp: CTA bilaterally, breathing comfortably on oxygen by nasal canula  Abd: Soft, nontender, nondistended  Ext: no edema in lower extremities.  Weakness in the left lower extremity with hip flexors 3/5, knee extensors 3/5, dorsiflexion, plantarflexion and EHL 0-1/5    Recent Results (from the past 72 hour(s))   APTT    Collection Time: 19  3:02 AM   Result Value Ref Range    APTT 64.6 (H) 24.7 - 36.0 sec   MAGNESIUM    Collection Time: 19  1:52 AM   Result Value Ref Range    Magnesium 1.9 1.5 - 2.5 mg/dL   EKG    Collection Time: 19  2:23 PM   Result Value Ref Range    Report       [facility]    Test Date:  2019  Pt Name:    CARIDAD PARK            Department: [facility]  MRN:        [id number]                      Room:       [facility]  Gender:     Female                       Technician: WT  :        1945                   Requested By:EMILIE FERRELL  Order #:    [id number]                    Reading MD: Rashad Winkler MD    Measurements  Intervals                                Axis  Rate:       78                           P:          55  NJ:         160                          QRS:        12  QRSD:       94                           T:          -63  QT:         400  QTc:        456    Interpretive Statements  SINUS RHYTHM  NONSPECIFIC ST-T WAVE ABNORMALITIES, INFERIOR LATERAL LEADS  Compared to ECG 2019 10:54:44  No significant changes    Electronically Signed On 2019 15:36:54 PDT by Rashad Winkler MD     CBC with Differential     Collection Time: 09/07/19  5:20 AM   Result Value Ref Range    WBC 5.9 4.8 - 10.8 K/uL    RBC 5.30 4.20 - 5.40 M/uL    Hemoglobin 15.2 12.0 - 16.0 g/dL    Hematocrit 48.0 (H) 37.0 - 47.0 %    MCV 90.6 81.4 - 97.8 fL    MCH 28.7 27.0 - 33.0 pg    MCHC 31.7 (L) 33.6 - 35.0 g/dL    RDW 46.1 35.9 - 50.0 fL    Platelet Count 152 (L) 164 - 446 K/uL    MPV 10.4 9.0 - 12.9 fL    Neutrophils-Polys 55.30 44.00 - 72.00 %    Lymphocytes 31.10 22.00 - 41.00 %    Monocytes 7.80 0.00 - 13.40 %    Eosinophils 4.80 0.00 - 6.90 %    Basophils 0.80 0.00 - 1.80 %    Immature Granulocytes 0.20 0.00 - 0.90 %    Nucleated RBC 0.00 /100 WBC    Neutrophils (Absolute) 3.26 2.00 - 7.15 K/uL    Lymphs (Absolute) 1.83 1.00 - 4.80 K/uL    Monos (Absolute) 0.46 0.00 - 0.85 K/uL    Eos (Absolute) 0.28 0.00 - 0.51 K/uL    Baso (Absolute) 0.05 0.00 - 0.12 K/uL    Immature Granulocytes (abs) 0.01 0.00 - 0.11 K/uL    NRBC (Absolute) 0.00 K/uL   Comp Metabolic Panel (CMP)    Collection Time: 09/07/19  5:20 AM   Result Value Ref Range    Sodium 139 135 - 145 mmol/L    Potassium 4.2 3.6 - 5.5 mmol/L    Chloride 106 96 - 112 mmol/L    Co2 25 20 - 33 mmol/L    Anion Gap 8.0 0.0 - 11.9    Glucose 88 65 - 99 mg/dL    Bun 22 8 - 22 mg/dL    Creatinine 0.82 0.50 - 1.40 mg/dL    Calcium 9.0 8.5 - 10.5 mg/dL    AST(SGOT) 23 12 - 45 U/L    ALT(SGPT) 18 2 - 50 U/L    Alkaline Phosphatase 60 30 - 99 U/L    Total Bilirubin 0.9 0.1 - 1.5 mg/dL    Albumin 3.7 3.2 - 4.9 g/dL    Total Protein 6.1 6.0 - 8.2 g/dL    Globulin 2.4 1.9 - 3.5 g/dL    A-G Ratio 1.5 g/dL   Magnesium    Collection Time: 09/07/19  5:20 AM   Result Value Ref Range    Magnesium 1.8 1.5 - 2.5 mg/dL   Vitamin D, 25-hydroxy (blood)    Collection Time: 09/07/19  5:20 AM   Result Value Ref Range    25-Hydroxy   Vitamin D 25 44 30 - 100 ng/mL   ESTIMATED GFR    Collection Time: 09/07/19  5:20 AM   Result Value Ref Range    GFR If African American >60 >60 mL/min/1.73 m 2    GFR If Non African American  37 >60 >60 mL/min/1.73 m 2       Current Facility-Administered Medications   Medication Frequency   • Respiratory Care per Protocol Continuous RT   • Pharmacy Consult Request ...Pain Management Review 1 Each PHARMACY TO DOSE   • acetaminophen (TYLENOL) tablet 650 mg Q4HRS PRN   • hydrALAZINE (APRESOLINE) tablet 25 mg Q8HRS PRN   • senna-docusate (PERICOLACE or SENOKOT S) 8.6-50 MG per tablet 2 Tab BID    And   • polyethylene glycol/lytes (MIRALAX) PACKET 1 Packet QDAY PRN    And   • magnesium hydroxide (MILK OF MAGNESIA) suspension 30 mL QDAY PRN    And   • bisacodyl (DULCOLAX) suppository 10 mg QDAY PRN   • artificial tears ophthalmic solution 1 Drop PRN   • benzocaine-menthol (CEPACOL) lozenge 1 Lozenge Q2HRS PRN   • mag hydrox-al hydrox-simeth (MAALOX PLUS ES or MYLANTA DS) suspension 20 mL Q2HRS PRN   • ondansetron (ZOFRAN ODT) dispertab 4 mg 4X/DAY PRN    Or   • ondansetron (ZOFRAN) syringe/vial injection 4 mg 4X/DAY PRN   • traZODone (DESYREL) tablet 50 mg QHS PRN   • sodium chloride (OCEAN) 0.65 % nasal spray 2 Spray PRN   • melatonin tablet 3 mg HS PRN   • apixaban (ELIQUIS) tablet 10 mg BID   • atorvastatin (LIPITOR) tablet 80 mg Q EVENING   • lisinopril (PRINIVIL) tablet 20 mg DAILY   • [START ON 9/13/2019] apixaban (ELIQUIS) tablet 5 mg BID   • albuterol inhaler 2 Puff Q4HRS PRN       Orders Placed This Encounter   Procedures   • Diet Order Regular     Standing Status:   Standing     Number of Occurrences:   1     Order Specific Question:   Diet:     Answer:   Regular [1]       Assessment:  Active Hospital Problems    Diagnosis   • *CVA (cerebral vascular accident) (HCC)   • Azotemia   • Tachycardia   • PVD (peripheral vascular disease) (HCC)   • Arterial occlusion, lower extremity (HCC)   • COPD   • Chronic respiratory failure (HCC)   • Thrombocytopenia (HCC)   • Tobacco abuse   • Essential hypertension       Medical Decision Making and Plan:  Right KIM and MCA ischemic stroke  Likely  cardio-embolic  Continue full rehab program  PT/OT/SLP, 1 hr each discipline, 5 days per week    Pain/Neuropathic pain  Patient had been taking oxycodone on acute care.  Trial prn use, monitor need.  Consider low dose gabapentin at bedtime.    Continue Eliquis and statin for secondary stroke prophylaxis  Follow up with stroke bridge clinic, needs monitor  Medications unchanged     Possible NPH  Follow up stroke bridge clinic     Appreciate assistance of hospitalist with her medical co-morbidities:     Hypertension  Tachycardia, check EKG  Appreciate hospitalist assistance    Peripheral vascular disease  Outpatient follow-up with Eliquis and lipitor    Thrombocytopenia  Appreciate hospitalist input    Tobacco use  COPD  Continue smoking cessation       Jarred Hodges M.D.

## 2019-09-10 ENCOUNTER — APPOINTMENT (OUTPATIENT)
Dept: OBGYN | Facility: CLINIC | Age: 29
End: 2019-09-10

## 2019-10-07 ENCOUNTER — APPOINTMENT (OUTPATIENT)
Dept: PLASTIC SURGERY | Facility: CLINIC | Age: 29
End: 2019-10-07
Payer: MEDICAID

## 2019-10-07 ENCOUNTER — APPOINTMENT (OUTPATIENT)
Dept: GASTROENTEROLOGY | Facility: CLINIC | Age: 29
End: 2019-10-07
Payer: MEDICAID

## 2019-10-07 VITALS — BODY MASS INDEX: 25.76 KG/M2 | WEIGHT: 140 LBS | HEIGHT: 62 IN

## 2019-10-07 VITALS
DIASTOLIC BLOOD PRESSURE: 74 MMHG | HEIGHT: 64 IN | HEART RATE: 69 BPM | WEIGHT: 144 LBS | BODY MASS INDEX: 24.59 KG/M2 | SYSTOLIC BLOOD PRESSURE: 110 MMHG

## 2019-10-07 DIAGNOSIS — Z78.9 OTHER SPECIFIED HEALTH STATUS: ICD-10-CM

## 2019-10-07 DIAGNOSIS — Z86.2 PERSONAL HISTORY OF DISEASES OF THE BLOOD AND BLOOD-FORMING ORGANS AND CERTAIN DISORDERS INVOLVING THE IMMUNE MECHANISM: ICD-10-CM

## 2019-10-07 DIAGNOSIS — Z87.898 PERSONAL HISTORY OF OTHER SPECIFIED CONDITIONS: ICD-10-CM

## 2019-10-07 DIAGNOSIS — Z87.19 PERSONAL HISTORY OF OTHER DISEASES OF THE DIGESTIVE SYSTEM: ICD-10-CM

## 2019-10-07 DIAGNOSIS — Z86.59 PERSONAL HISTORY OF OTHER MENTAL AND BEHAVIORAL DISORDERS: ICD-10-CM

## 2019-10-07 DIAGNOSIS — R63.4 ABNORMAL WEIGHT LOSS: ICD-10-CM

## 2019-10-07 DIAGNOSIS — R05 COUGH: ICD-10-CM

## 2019-10-07 PROCEDURE — 99203 OFFICE O/P NEW LOW 30 MIN: CPT

## 2019-10-07 RX ORDER — PREDNISONE 10 MG
TABLET ORAL
Refills: 0 | Status: ACTIVE | COMMUNITY

## 2019-10-07 RX ORDER — FLUTICASONE PROPIONATE 50 MCG
SPRAY, SUSPENSION NASAL
Refills: 0 | Status: ACTIVE | COMMUNITY

## 2019-10-07 RX ORDER — OMEPRAZOLE 40 MG/1
40 CAPSULE, DELAYED RELEASE ORAL
Qty: 60 | Refills: 1 | Status: ACTIVE | COMMUNITY
Start: 2019-10-07 | End: 1900-01-01

## 2019-10-07 RX ORDER — PROMETHAZINE HYDROCHLORIDE 50 MG/1
TABLET ORAL
Refills: 0 | Status: ACTIVE | COMMUNITY

## 2019-10-07 NOTE — ASSESSMENT
[FreeTextEntry1] : 30 yo RHD with symptomatic left volar ganglion cyst\par \par Left radial volar wrist ganglion cyst associated with progressive enlargement and discomfort.\par \par -The patient is a good candidate for surgical excision of ganglion at the dorsal wrist.\par -I discussed with the patient the treatment options of observation vs. needle aspiration vs. surgical excision. The benefit ,risks, and outcomes of each treatment option were discussed. Regarding observation, the ganglion may enlarge over time and cause a mass effect and potential discomfort and paresthesias. Regarding needle aspiration, the treatment effect is temporary, cyst recurrence is significantly high, and is associated with risk of joint infection. Regarding surgical excision of the ganglion, the benefit is a greater reduction of ganglion recurrence; and the risks are and not limited to bleeding, infection, seroma, hematoma, ganglion recurrence requiring need for additional excision, joint stiffness, need for hand OT, and dissatisfaction with outcome.\par -The patient understand all the risks and elected to proceed with excision of ganglion. All questions were answered to the patient's satisfaction. Informed consent was obtained.\par -Will schedule for NADYA procedure\par \par

## 2019-10-07 NOTE — REVIEW OF SYSTEMS
[Cough] : cough [As Noted in HPI] : as noted in HPI [Fever] : no fever [Eye Pain] : no eye pain [Earache] : no earache [Palpitations] : no palpitations [Dysmenorrhea] : no dysmenorrhea [Joint Swelling] : no joint swelling [Skin Lesions] : no skin lesions [Dizziness] : no dizziness [Suicidal] : not suicidal [Deepening Of The Voice] : no deepening of the voice [Easy Bleeding] : no tendency for easy bleeding

## 2019-10-07 NOTE — REASON FOR VISIT
[Initial Evaluation] : an initial evaluation [FreeTextEntry1] : PMD-[facility] ([facility])

## 2019-10-07 NOTE — ASSESSMENT
[FreeTextEntry1] : 29-year-old female who presents with anemia and cough. She was seen by a pulmonologist who indicated that her cough is unrelated to her lungs. She was also noted in August 2019 to have mild normocytic anemia to 11.9. MCV is 81.5.  7 lb weight loss over three months.\par \par - Anemia panel\par - Celiac serologies\par - lifestyle modification\par - omeprazole 40mg daily 1/2 hour before breakfast\par - The patient will benefit from upper endoscopy. Risks, benefits, indications and alternatives discussed with patient who was amenable.\par

## 2019-10-07 NOTE — PHYSICAL EXAM
[General Appearance - Alert] : alert [Sclera] : the sclera and conjunctiva were normal [Outer Ear] : the ears and nose were normal in appearance [Neck Appearance] : the appearance of the neck was normal [Auscultation Breath Sounds / Voice Sounds] : lungs were clear to auscultation bilaterally [Heart Sounds] : normal S1 and S2 [Edema] : there was no peripheral edema [Abdomen Soft] : soft [Abdomen Tenderness] : non-tender [Abnormal Walk] : normal gait [] : no rash [Impaired Insight] : insight and judgment were intact

## 2019-10-07 NOTE — CONSULT LETTER
[Dear  ___] : Dear  [unfilled], [Consult Letter:] : I had the pleasure of evaluating your patient, [unfilled]. [Please see my note below.] : Please see my note below. [Consult Closing:] : Thank you very much for allowing me to participate in the care of this patient.  If you have any questions, please do not hesitate to contact me. [Sincerely,] : Sincerely, [FreeTextEntry3] : Denis Zhu MD

## 2019-10-07 NOTE — PHYSICAL EXAM
[de-identified] : well-appearing, NAD [de-identified] : Right UE: unremarkable, normal finger cascade, SILT, warm, cap refill, motor exam intact\par left UE: normal finger cascade, motor 5/5, sensibility intact M/R/U with no diminished sensation, capr refill x 2 seconds all digits; palpable volar radial ganglion transilluminating mass; no pulsatile mass; Brodie's test negative; patent radial and ulnar artery. [de-identified] : good inspiratory effort

## 2019-10-07 NOTE — HISTORY OF PRESENT ILLNESS
[de-identified] : 29-year-old female who presents with anemia and cough. The patient was seen in the ER three times and was told that no specific cause could be identified She was seen by a pulmonologist who  indicated that her cough is unrelated to her lungs.  The cough usually occurs when she lies down but can occur any time. It is not specifically post prandial. The patient reports heartburn twice per week but these episodes are not associated with cough. \par \par The patient has lost 7 lbs over the past three months.\par \par She was also noted in August 2019 to have mild normocytic anemia to 11.9. MCV is 81.5. She was started on iron pills by another physician.\par \par The patient denies rectal bleeding, melena, diarrhea, constipation, change in bowel habits, change in stool caliber,  change in appetite, nausea, vomiting, difficulty swallowing, early satiety, abdominal pain, fever or chills.\par

## 2019-10-07 NOTE — HISTORY OF PRESENT ILLNESS
[FreeTextEntry1] : 30 yo RHD F  who is referred from [facility] who is here today for evaluation of left wrist "lump" that has increased in size x 1 month and is associated with discomfort.   She reports the pain is exacerbated with wrist extension and lifting heavy objects; discomfort/pain not relieved with Tylenol PRN.  Denies antecedent hand trauma or prior hand surgeries.  c/o associated numbness and tingling over thenar area.\par \par Recently started on prednisone 2 weeks ago for cough.  Denies diagnosis of asthma. \par Occupation: unemployed, used to work at dry \par \par She is here today to discuss treatment options.\par \par Denies MRSA skin infections and VTE\par Finnish phone : Gordon [phone]

## 2019-10-15 ENCOUNTER — OUTPATIENT (OUTPATIENT)
Dept: OUTPATIENT SERVICES | Facility: HOSPITAL | Age: 29
LOS: 1 days | Discharge: HOME | End: 2019-10-15
Payer: MEDICAID

## 2019-10-15 ENCOUNTER — APPOINTMENT (OUTPATIENT)
Dept: PLASTIC SURGERY | Facility: AMBULATORY SURGERY CENTER | Age: 29
End: 2019-10-15
Payer: MEDICAID

## 2019-10-15 ENCOUNTER — RESULT REVIEW (OUTPATIENT)
Age: 29
End: 2019-10-15

## 2019-10-15 VITALS
HEART RATE: 83 BPM | RESPIRATION RATE: 18 BRPM | OXYGEN SATURATION: 95 % | SYSTOLIC BLOOD PRESSURE: 103 MMHG | DIASTOLIC BLOOD PRESSURE: 67 MMHG

## 2019-10-15 VITALS
HEART RATE: 65 BPM | RESPIRATION RATE: 16 BRPM | DIASTOLIC BLOOD PRESSURE: 72 MMHG | WEIGHT: 139.99 LBS | TEMPERATURE: 99 F | SYSTOLIC BLOOD PRESSURE: 112 MMHG | OXYGEN SATURATION: 99 %

## 2019-10-15 PROCEDURE — 99024 POSTOP FOLLOW-UP VISIT: CPT

## 2019-10-15 PROCEDURE — 88304 TISSUE EXAM BY PATHOLOGIST: CPT | Mod: 26

## 2019-10-15 PROCEDURE — 25111 REMOVE WRIST TENDON LESION: CPT | Mod: LT

## 2019-10-15 RX ORDER — OXYCODONE AND ACETAMINOPHEN 5; 325 MG/1; MG/1
1 TABLET ORAL EVERY 4 HOURS
Refills: 0 | Status: DISCONTINUED | OUTPATIENT
Start: 2019-10-15 | End: 2019-10-15

## 2019-10-15 RX ORDER — HYDROMORPHONE HYDROCHLORIDE 2 MG/ML
0.5 INJECTION INTRAMUSCULAR; INTRAVENOUS; SUBCUTANEOUS
Refills: 0 | Status: DISCONTINUED | OUTPATIENT
Start: 2019-10-15 | End: 2019-10-15

## 2019-10-15 RX ORDER — TRAMADOL HYDROCHLORIDE 50 MG/1
1 TABLET ORAL
Qty: 10 | Refills: 0
Start: 2019-10-15 | End: 2019-10-17

## 2019-10-15 RX ORDER — HYDROMORPHONE HYDROCHLORIDE 2 MG/ML
1 INJECTION INTRAMUSCULAR; INTRAVENOUS; SUBCUTANEOUS
Refills: 0 | Status: DISCONTINUED | OUTPATIENT
Start: 2019-10-15 | End: 2019-10-15

## 2019-10-15 RX ORDER — ONDANSETRON 8 MG/1
4 TABLET, FILM COATED ORAL ONCE
Refills: 0 | Status: DISCONTINUED | OUTPATIENT
Start: 2019-10-15 | End: 2019-11-04

## 2019-10-15 RX ORDER — SODIUM CHLORIDE 9 MG/ML
1000 INJECTION, SOLUTION INTRAVENOUS
Refills: 0 | Status: DISCONTINUED | OUTPATIENT
Start: 2019-10-15 | End: 2019-11-04

## 2019-10-15 RX ADMIN — SODIUM CHLORIDE 100 MILLILITER(S): 9 INJECTION, SOLUTION INTRAVENOUS at 08:26

## 2019-10-15 NOTE — ASU DISCHARGE PLAN (ADULT/PEDIATRIC) - CALL YOUR DOCTOR IF YOU HAVE ANY OF THE FOLLOWING:
Pain not relieved by Medications/Bleeding that does not stop/Swelling that gets worse/Wound/Surgical Site with redness, or foul smelling discharge or pus/Numbness, tingling, color or temperature change to extremity

## 2019-10-15 NOTE — ASU DISCHARGE PLAN (ADULT/PEDIATRIC) - PAIN MANAGEMENT
Doxycycline, Tramadol as needed for pain/[facility]

## 2019-10-15 NOTE — ASU DISCHARGE PLAN (ADULT/PEDIATRIC) - ASU DC SPECIAL INSTRUCTIONSFT
Keep left hand clean and dry.   Do not remove the dressing or get it wet.   Keep hand elevated at all times.   Rest. No lifting.   May resume steroids tomorrow.

## 2019-10-15 NOTE — BRIEF OPERATIVE NOTE - NSICDXBRIEFPROCEDURE_GEN_ALL_CORE_FT
PROCEDURES:  Primary excision of ganglion cyst of wrist 15-Oct-2019 08:19:58 radial volar ganglion cyst of left wrist Karyn Hester

## 2019-10-15 NOTE — ASU DISCHARGE PLAN (ADULT/PEDIATRIC) - CARE PROVIDER_API CALL
Karyn Hester)  [facility]  [address] [place] [address], [address]  [place] [address]  Phone: [phone]  Fax: [phone]  Follow Up Time:

## 2019-10-17 LAB — SURGICAL PATHOLOGY STUDY: SIGNIFICANT CHANGE UP

## 2019-10-18 PROBLEM — J45.909 UNSPECIFIED ASTHMA, UNCOMPLICATED: Chronic | Status: ACTIVE | Noted: 2019-10-15

## 2019-10-21 DIAGNOSIS — M67.432 GANGLION, LEFT WRIST: ICD-10-CM

## 2019-10-21 DIAGNOSIS — D64.9 ANEMIA, UNSPECIFIED: ICD-10-CM

## 2019-10-21 DIAGNOSIS — J45.909 UNSPECIFIED ASTHMA, UNCOMPLICATED: ICD-10-CM

## 2019-10-23 ENCOUNTER — APPOINTMENT (OUTPATIENT)
Dept: PLASTIC SURGERY | Facility: CLINIC | Age: 29
End: 2019-10-23
Payer: MEDICAID

## 2019-10-23 PROCEDURE — 99024 POSTOP FOLLOW-UP VISIT: CPT

## 2019-10-23 NOTE — PHYSICAL EXAM
[de-identified] : well-appearing, NAD [de-identified] : left volar wrist incision healing appropriately, clean, dry and intact, minimal soft tissue swelling, no evidence of cellulitis or fluid collection, FROM

## 2019-10-23 NOTE — ASSESSMENT
[FreeTextEntry1] : 30 yo RHD with symptomatic left volar wrist ganglion cyst now POD#8 s/p excision. Doing well. \par \par - dressing changed\par - hand rest and elevation\par - f/u next week for suture removal \par

## 2019-10-23 NOTE — HISTORY OF PRESENT ILLNESS
[FreeTextEntry1] : 28 yo RHD F  who is referred from [facility] who is here today for evaluation of left wrist "lump" that has increased in size x 1 month and is associated with discomfort.   She reports the pain is exacerbated with wrist extension and lifting heavy objects; discomfort/pain not relieved with Tylenol PRN.  Denies antecedent hand trauma or prior hand surgeries.  c/o associated numbness and tingling over thenar area.\par \par Recently started on prednisone 2 weeks ago for cough.  Denies diagnosis of asthma. \par Occupation: unemployed, used to work at dry \par \par She is here today to discuss treatment options.\par \par Denies MRSA skin infections and VTE\par [place] phone : Gordon [phone]\par \par Interval hx (10/23/10). Patient presents today POD#8 s/p excision of left volar wrist ganglion c/o incisional discomfort, now improving. Compliant with hand rest and elevation. Denies any fever, chills or bleeding from the site.

## 2019-10-30 ENCOUNTER — APPOINTMENT (OUTPATIENT)
Dept: PLASTIC SURGERY | Facility: CLINIC | Age: 29
End: 2019-10-30

## 2019-10-30 ENCOUNTER — APPOINTMENT (OUTPATIENT)
Dept: PLASTIC SURGERY | Facility: CLINIC | Age: 29
End: 2019-10-30
Payer: MEDICAID

## 2019-10-30 PROCEDURE — 99024 POSTOP FOLLOW-UP VISIT: CPT

## 2019-10-30 NOTE — PHYSICAL EXAM
[de-identified] : well-appearing, NAD [de-identified] : left volar wrist incision healing appropriately, clean, dry and intact, minimal soft tissue swelling, no evidence of cellulitis or fluid collection, FROM, sensory exam normal

## 2019-10-30 NOTE — ASSESSMENT
[FreeTextEntry1] : 28 yo RHD with symptomatic left volar wrist ganglion cyst now POD#15 s/p excision. Doing well. \par \par -Pathology reviewed\par -Sutures removed\par -Steri strip applied\par -Wound care and scar creams reviewed\par -Sun protection and routine dermatology f/u\par -F/u 3 months with Dr. Hester\par

## 2019-10-30 NOTE — HISTORY OF PRESENT ILLNESS
[FreeTextEntry1] : 30 yo RHD F  who is referred from [facility] who is here today for evaluation of left wrist "lump" that has increased in size x 1 month and is associated with discomfort.   She reports the pain is exacerbated with wrist extension and lifting heavy objects; discomfort/pain not relieved with Tylenol PRN.  Denies antecedent hand trauma or prior hand surgeries.  c/o associated numbness and tingling over thenar area.\par \par Recently started on prednisone 2 weeks ago for cough.  Denies diagnosis of asthma. \par Occupation: unemployed, used to work at dry \par \par She is here today to discuss treatment options.\par \par Denies MRSA skin infections and VTE\par [place] phone : Gordon [phone]\par \par Interval hx (10/23/19). Patient presents today POD#8 s/p excision of left volar wrist ganglion c/o incisional discomfort, now improving. Compliant with hand rest and elevation. Denies any fever, chills or bleeding from the site. \par \par Interval hx (10/30/19): Pt presents for f/u and suture removal- POD #15. C/o mild residual swelling. Denies significant pain or f/c.

## 2019-10-30 NOTE — DATA REVIEWED
[FreeTextEntry1] : \par  Pathology             Final\par \par No Documents Attached\par \par \par \par \par   [facility] Accession Number : [id number]\par \par AMIRA MCGUIRE\par \par \par \par Surgical Final Report\par \par \par \par \par Final Diagnosis\par Left volar wrist ganglion cyst, excision:\par - Ganglion cyst.\par \par Verified by: Diane Lopez M.D.\par (Electronic Signature)\par Reported on: 10/17/19 17:14 EDT, [address] [place] [address],[facility] [address]\par Phone: [phone]   Fax: [phone]\par _________________________________________________________________\par \par Clinical History\par Excision of left volar wrist ganglion cyst (radial wrist)\par \par Specimen(s) Submitted\par Left volar wrist ganglion cyst\par \par Gross Description\par The specimen is received in formalin, labeled "left volar wrist\par ganglion cyst" and consists of one elongated gray white soft\par tissue measuring 1.3 x 0.2 x 0.2 cm. The specimen is submitted\par entirely. (1 block)\par \par Specimen was received and underwent gross examination at [facility], [address] [place] [address],[facility] [address].\par \par 10/15/19 11:54 ns\par \par Perioperative Diagnosis\par Left volar wrist ganglion cyst\par \par  \par \par  Ordered by: ADONIS EGAN       Collected/Examined: 15Oct2019 08:04AM       \par Verified by: SANDRA BORJA 30Oct2019 10:06AM       \par  Result Communication: Discussed results with patient;\par Stage: Final       \par  Performed at: [facility]       Resulted: 17Oct2019 05:14PM       Last Updated: 30Oct2019 10:06AM       Accession: [id number]

## 2019-11-20 ENCOUNTER — TRANSCRIPTION ENCOUNTER (OUTPATIENT)
Age: 29
End: 2019-11-20

## 2019-11-20 ENCOUNTER — OUTPATIENT (OUTPATIENT)
Dept: OUTPATIENT SERVICES | Facility: HOSPITAL | Age: 29
LOS: 1 days | Discharge: HOME | End: 2019-11-20
Payer: MEDICAID

## 2019-11-20 ENCOUNTER — RESULT REVIEW (OUTPATIENT)
Age: 29
End: 2019-11-20

## 2019-11-20 VITALS — DIASTOLIC BLOOD PRESSURE: 64 MMHG | RESPIRATION RATE: 18 BRPM | SYSTOLIC BLOOD PRESSURE: 110 MMHG | HEART RATE: 69 BPM

## 2019-11-20 VITALS
WEIGHT: 139.99 LBS | SYSTOLIC BLOOD PRESSURE: 100 MMHG | HEART RATE: 71 BPM | DIASTOLIC BLOOD PRESSURE: 65 MMHG | HEIGHT: 62 IN | RESPIRATION RATE: 18 BRPM | TEMPERATURE: 98 F

## 2019-11-20 DIAGNOSIS — Z98.890 OTHER SPECIFIED POSTPROCEDURAL STATES: Chronic | ICD-10-CM

## 2019-11-20 PROCEDURE — 88305 TISSUE EXAM BY PATHOLOGIST: CPT | Mod: 26

## 2019-11-20 PROCEDURE — 43239 EGD BIOPSY SINGLE/MULTIPLE: CPT

## 2019-11-20 PROCEDURE — 88312 SPECIAL STAINS GROUP 1: CPT | Mod: 26

## 2019-11-20 RX ORDER — FEXOFENADINE HCL 30 MG
1 TABLET ORAL
Qty: 0 | Refills: 0 | DISCHARGE

## 2019-11-20 RX ORDER — FLUTICASONE FUROATE AND VILANTEROL TRIFENATATE 100; 25 UG/1; UG/1
0 POWDER RESPIRATORY (INHALATION)
Qty: 0 | Refills: 0 | DISCHARGE

## 2019-11-20 RX ORDER — ALBUTEROL 90 UG/1
0 AEROSOL, METERED ORAL
Qty: 0 | Refills: 0 | DISCHARGE

## 2019-11-20 NOTE — H&P PST ADULT - ASSESSMENT
30 yo female with comes with GERd symptoms, anemia and we are performing the EGD today.    Plan:  Will perform EGD today.  Risks and benefit explained to the patient.

## 2019-11-20 NOTE — ASU DISCHARGE PLAN (ADULT/PEDIATRIC) - FOLLOW UP APPOINTMENTS
[facility]:  Endoscopy/Ambulatory Surgery [place]... 911 or go to the nearest Emergency Room

## 2019-11-20 NOTE — CHART NOTE - NSCHARTNOTEFT_GEN_A_CORE
PACU ANESTHESIA ADMISSION NOTE      Procedure: EGD  Post op diagnosis:      ____  Intubated  TV:______       Rate: ______      FiO2: ______    _x___  Patent Airway    _x___  Full return of protective reflexes    _x___  Full recovery from anesthesia / back to baseline status    Vitals  SPO2:-99  HR:-100  RR:-12  B.P:-91/54      Mental Status:  _x___ Awake   ___x_ Alert   _____ Drowsy   _____ Sedated    Nausea/Vomiting:  _x___  NO       ______Yes,   See Post - Op Orders         Pain Scale (0-10):  __0___    Treatment: _x___ None    __x__ See Post - Op/PCA Orders    Post - Operative Fluids:   ___ Oral   ____x See Post - Op Orders    Plan: Discharge:   _x___Home       ____Floor     _____Critical Care    _____  Other:_________________    Comments:  Report endorsed to RN in pacu  Vitals stable  No anesthesia issues or complications noted.  Discharge to patient to home when criteria met.

## 2019-11-20 NOTE — ASU DISCHARGE PLAN (ADULT/PEDIATRIC) - CALL YOUR DOCTOR IF YOU HAVE ANY OF THE FOLLOWING:
Nausea and vomiting that does not stop/Pain not relieved by Medications/Fever greater than (need to indicate Fahrenheit or Celsius)/Inability to tolerate liquids or foods/Bleeding that does not stop/Excessive diarrhea

## 2019-11-21 ENCOUNTER — APPOINTMENT (OUTPATIENT)
Dept: OBGYN | Facility: CLINIC | Age: 29
End: 2019-11-21
Payer: MEDICAID

## 2019-11-21 ENCOUNTER — OUTPATIENT (OUTPATIENT)
Dept: OUTPATIENT SERVICES | Facility: HOSPITAL | Age: 29
LOS: 1 days | Discharge: HOME | End: 2019-11-21

## 2019-11-21 VITALS
SYSTOLIC BLOOD PRESSURE: 102 MMHG | BODY MASS INDEX: 27.05 KG/M2 | DIASTOLIC BLOOD PRESSURE: 60 MMHG | HEIGHT: 62 IN | WEIGHT: 147 LBS

## 2019-11-21 DIAGNOSIS — Z98.890 OTHER SPECIFIED POSTPROCEDURAL STATES: Chronic | ICD-10-CM

## 2019-11-21 PROCEDURE — 99212 OFFICE O/P EST SF 10 MIN: CPT

## 2019-11-21 RX ORDER — MEDROXYPROGESTERONE ACETATE 150 MG/ML
150 INJECTION, SUSPENSION INTRAMUSCULAR
Qty: 0 | Refills: 0 | Status: COMPLETED | OUTPATIENT
Start: 2019-11-21

## 2019-11-21 RX ADMIN — MEDROXYPROGESTERONE ACETATE 0 MG/ML: 150 INJECTION, SUSPENSION INTRAMUSCULAR at 00:00

## 2019-11-22 DIAGNOSIS — Z30.42 ENCOUNTER FOR SURVEILLANCE OF INJECTABLE CONTRACEPTIVE: ICD-10-CM

## 2019-11-22 LAB — SURGICAL PATHOLOGY STUDY: SIGNIFICANT CHANGE UP

## 2019-11-26 DIAGNOSIS — R12 HEARTBURN: ICD-10-CM

## 2019-11-26 DIAGNOSIS — K29.50 UNSPECIFIED CHRONIC GASTRITIS WITHOUT BLEEDING: ICD-10-CM

## 2019-11-26 DIAGNOSIS — B96.81 HELICOBACTER PYLORI [H. PYLORI] AS THE CAUSE OF DISEASES CLASSIFIED ELSEWHERE: ICD-10-CM

## 2019-11-26 DIAGNOSIS — J45.909 UNSPECIFIED ASTHMA, UNCOMPLICATED: ICD-10-CM

## 2019-11-26 DIAGNOSIS — R63.4 ABNORMAL WEIGHT LOSS: ICD-10-CM

## 2019-11-26 DIAGNOSIS — D64.9 ANEMIA, UNSPECIFIED: ICD-10-CM

## 2019-12-16 ENCOUNTER — APPOINTMENT (OUTPATIENT)
Dept: PLASTIC SURGERY | Facility: CLINIC | Age: 29
End: 2019-12-16
Payer: MEDICAID

## 2019-12-16 PROCEDURE — 99024 POSTOP FOLLOW-UP VISIT: CPT

## 2019-12-16 NOTE — PHYSICAL EXAM
[de-identified] : well-appearing, NAD [de-identified] : left volar wrist incision healed with minor hypertrophic scar and minimal soft tissue swelling, hypersensitive to touch, FROM, otherwise sensory exam normal

## 2019-12-16 NOTE — HISTORY OF PRESENT ILLNESS
[FreeTextEntry1] : 30 yo RHD F  who is referred from [facility] who is here today for evaluation of left wrist "lump" that has increased in size x 1 month and is associated with discomfort.   She reports the pain is exacerbated with wrist extension and lifting heavy objects; discomfort/pain not relieved with Tylenol PRN.  Denies antecedent hand trauma or prior hand surgeries.  c/o associated numbness and tingling over thenar area.\par \par Recently started on prednisone 2 weeks ago for cough.  Denies diagnosis of asthma. \par Occupation: unemployed, used to work at dry \par \par She is here today to discuss treatment options.\par \par Denies MRSA skin infections and VTE\par [place]alander phone : Gordon [phone]\par \par Interval hx (10/23/19). Patient presents today POD#8 s/p excision of left volar wrist ganglion c/o incisional discomfort, now improving. Compliant with hand rest and elevation. Denies any fever, chills or bleeding from the site. \par \par Interval hx (10/30/19): Pt presents for f/u and suture removal- POD #15. C/o mild residual swelling. Denies significant pain or f/c.\par \par Interval hx (19). Patient presents today 2 months s/p excision of left volar wrist ganglion cyst c/o discomfort and heightened sensitivity at surgical site. Denies any trauma to the wrist.

## 2019-12-16 NOTE — ASSESSMENT
[FreeTextEntry1] : 30 yo RHD F with symptomatic left volar wrist ganglion cyst now 2 months s/p excision with hypersensitive hypertrophic scar. \par \par -ScarAway silicone sheeting  to incision (HTS)\par -Aggressive scar massage; demonstrated with pt (TID or more frequent)\par -OT for desensitization tx (ordered)\par -Follow up in 3 weeks

## 2019-12-16 NOTE — DATA REVIEWED
[FreeTextEntry1] : \par  Pathology             Final\par \par No Documents Attached\par \par \par \par \par   [facility] Accession Number : [id number]\par \par AMIRA MCGUIRE\par \par \par \par Surgical Final Report\par \par \par \par \par Final Diagnosis\par Left volar wrist ganglion cyst, excision:\par - Ganglion cyst.\par \par Verified by: Diane Lopez M.D.\par (Electronic Signature)\par Reported on: 10/17/19 17:14 EDT, [address] [place] [address],[facility] [address]\par Phone: [phone]   Fax: [phone]\par _________________________________________________________________\par \par Clinical History\par Excision of left volar wrist ganglion cyst (radial wrist)\par \par Specimen(s) Submitted\par Left volar wrist ganglion cyst\par \par Gross Description\par The specimen is received in formalin, labeled "left volar wrist\par ganglion cyst" and consists of one elongated gray white soft\par tissue measuring 1.3 x 0.2 x 0.2 cm. The specimen is submitted\par entirely. (1 block)\par \par Specimen was received and underwent gross examination at [facility], [address] [place] [address],[facility] [address].\par \par 10/15/19 11:54 ns\par \par Perioperative Diagnosis\par Left volar wrist ganglion cyst\par \par  \par \par  Ordered by: ADONIS EGAN       Collected/Examined: 15Oct2019 08:04AM       \par Verified by: SANDRA BORJA 30Oct2019 10:06AM       \par  Result Communication: Discussed results with patient;\par Stage: Final       \par  Performed at: [facility]       Resulted: 17Oct2019 05:14PM       Last Updated: 30Oct2019 10:06AM       Accession: [id number]

## 2019-12-30 ENCOUNTER — EMERGENCY (EMERGENCY)
Facility: HOSPITAL | Age: 29
LOS: 0 days | Discharge: HOME | End: 2019-12-30
Attending: EMERGENCY MEDICINE | Admitting: EMERGENCY MEDICINE
Payer: MEDICAID

## 2019-12-30 VITALS
HEART RATE: 60 BPM | RESPIRATION RATE: 18 BRPM | DIASTOLIC BLOOD PRESSURE: 76 MMHG | SYSTOLIC BLOOD PRESSURE: 118 MMHG | TEMPERATURE: 99 F | OXYGEN SATURATION: 98 %

## 2019-12-30 DIAGNOSIS — Z98.890 OTHER SPECIFIED POSTPROCEDURAL STATES: Chronic | ICD-10-CM

## 2019-12-30 DIAGNOSIS — J03.90 ACUTE TONSILLITIS, UNSPECIFIED: ICD-10-CM

## 2019-12-30 DIAGNOSIS — J02.9 ACUTE PHARYNGITIS, UNSPECIFIED: ICD-10-CM

## 2019-12-30 LAB
ANION GAP SERPL CALC-SCNC: 17 MMOL/L — HIGH (ref 7–14)
BASOPHILS # BLD AUTO: 0.03 K/UL — SIGNIFICANT CHANGE UP (ref 0–0.2)
BASOPHILS NFR BLD AUTO: 0.3 % — SIGNIFICANT CHANGE UP (ref 0–1)
BUN SERPL-MCNC: 10 MG/DL — SIGNIFICANT CHANGE UP (ref 10–20)
CALCIUM SERPL-MCNC: 9.6 MG/DL — SIGNIFICANT CHANGE UP (ref 8.5–10.1)
CHLORIDE SERPL-SCNC: 104 MMOL/L — SIGNIFICANT CHANGE UP (ref 98–110)
CO2 SERPL-SCNC: 19 MMOL/L — SIGNIFICANT CHANGE UP (ref 17–32)
CREAT SERPL-MCNC: 0.6 MG/DL — LOW (ref 0.7–1.5)
EOSINOPHIL # BLD AUTO: 1.19 K/UL — HIGH (ref 0–0.7)
EOSINOPHIL NFR BLD AUTO: 13.4 % — HIGH (ref 0–8)
GLUCOSE SERPL-MCNC: 94 MG/DL — SIGNIFICANT CHANGE UP (ref 70–99)
HCT VFR BLD CALC: 37.6 % — SIGNIFICANT CHANGE UP (ref 37–47)
HGB BLD-MCNC: 12.4 G/DL — SIGNIFICANT CHANGE UP (ref 12–16)
IMM GRANULOCYTES NFR BLD AUTO: 0.2 % — SIGNIFICANT CHANGE UP (ref 0.1–0.3)
LYMPHOCYTES # BLD AUTO: 1.99 K/UL — SIGNIFICANT CHANGE UP (ref 1.2–3.4)
LYMPHOCYTES # BLD AUTO: 22.5 % — SIGNIFICANT CHANGE UP (ref 20.5–51.1)
MCHC RBC-ENTMCNC: 29 PG — SIGNIFICANT CHANGE UP (ref 27–31)
MCHC RBC-ENTMCNC: 33 G/DL — SIGNIFICANT CHANGE UP (ref 32–37)
MCV RBC AUTO: 88.1 FL — SIGNIFICANT CHANGE UP (ref 81–99)
MONOCYTES # BLD AUTO: 0.82 K/UL — HIGH (ref 0.1–0.6)
MONOCYTES NFR BLD AUTO: 9.3 % — SIGNIFICANT CHANGE UP (ref 1.7–9.3)
NEUTROPHILS # BLD AUTO: 4.81 K/UL — SIGNIFICANT CHANGE UP (ref 1.4–6.5)
NEUTROPHILS NFR BLD AUTO: 54.3 % — SIGNIFICANT CHANGE UP (ref 42.2–75.2)
NRBC # BLD: 0 /100 WBCS — SIGNIFICANT CHANGE UP (ref 0–0)
PLATELET # BLD AUTO: 232 K/UL — SIGNIFICANT CHANGE UP (ref 130–400)
POTASSIUM SERPL-MCNC: 4 MMOL/L — SIGNIFICANT CHANGE UP (ref 3.5–5)
POTASSIUM SERPL-SCNC: 4 MMOL/L — SIGNIFICANT CHANGE UP (ref 3.5–5)
RBC # BLD: 4.27 M/UL — SIGNIFICANT CHANGE UP (ref 4.2–5.4)
RBC # FLD: 14.4 % — SIGNIFICANT CHANGE UP (ref 11.5–14.5)
SODIUM SERPL-SCNC: 140 MMOL/L — SIGNIFICANT CHANGE UP (ref 135–146)
WBC # BLD: 8.86 K/UL — SIGNIFICANT CHANGE UP (ref 4.8–10.8)
WBC # FLD AUTO: 8.86 K/UL — SIGNIFICANT CHANGE UP (ref 4.8–10.8)

## 2019-12-30 PROCEDURE — 70491 CT SOFT TISSUE NECK W/DYE: CPT | Mod: 26

## 2019-12-30 PROCEDURE — 99284 EMERGENCY DEPT VISIT MOD MDM: CPT

## 2019-12-30 RX ORDER — SODIUM CHLORIDE 9 MG/ML
1000 INJECTION INTRAMUSCULAR; INTRAVENOUS; SUBCUTANEOUS ONCE
Refills: 0 | Status: COMPLETED | OUTPATIENT
Start: 2019-12-30 | End: 2019-12-30

## 2019-12-30 RX ORDER — METOCLOPRAMIDE HCL 10 MG
10 TABLET ORAL ONCE
Refills: 0 | Status: COMPLETED | OUTPATIENT
Start: 2019-12-30 | End: 2019-12-30

## 2019-12-30 RX ADMIN — Medication 10 MILLIGRAM(S): at 17:45

## 2019-12-30 RX ADMIN — SODIUM CHLORIDE 1000 MILLILITER(S): 9 INJECTION INTRAMUSCULAR; INTRAVENOUS; SUBCUTANEOUS at 17:45

## 2019-12-30 NOTE — ED PROVIDER NOTE - ATTENDING CONTRIBUTION TO CARE
Pt is a 30yo female who comes in for 2d of sore throat.  No fever.  No other complaints.    Exam: tonsils slightly enlarged, mild LAD, FROM of neck, CTAB  Plan: labs, ct

## 2019-12-30 NOTE — ED ADULT NURSE NOTE - NSIMPLEMENTINTERV_GEN_ALL_ED
Implemented All Universal Safety Interventions:  [place] to call system. Call bell, personal items and telephone within reach. Instruct patient to call for assistance. Room bathroom lighting operational. Non-slip footwear when patient is off stretcher. Physically safe environment: no spills, clutter or unnecessary equipment. Stretcher in lowest position, wheels locked, appropriate side rails in place.

## 2019-12-30 NOTE — ED PROVIDER NOTE - PATIENT PORTAL LINK FT
You can access the FollowMyHealth Patient Portal offered by [facility] by registering at the following website: [email or web address]. By joining [employer]’s FollowMyHealth portal, you will also be able to view your health information using other applications (apps) compatible with our system.

## 2019-12-30 NOTE — ED PROVIDER NOTE - PROGRESS NOTE DETAILS
YLUIANA: Reviewed all results and necessity for follow up. Counseled on red flags and to return for them.  Patient appears well on discharge.

## 2019-12-30 NOTE — ED PROVIDER NOTE - OBJECTIVE STATEMENT
30 y/o with hx Asthma presents to the ED c/o "I have left sided neck pain and swelling for 2 days with chills and painful swallowing. I was seen by my PMD and they sent me here for further evaluation." no cough/ nasal congestion

## 2020-01-06 ENCOUNTER — APPOINTMENT (OUTPATIENT)
Dept: PLASTIC SURGERY | Facility: CLINIC | Age: 30
End: 2020-01-06
Payer: MEDICAID

## 2020-01-06 PROCEDURE — 99024 POSTOP FOLLOW-UP VISIT: CPT

## 2020-01-06 NOTE — HISTORY OF PRESENT ILLNESS
[FreeTextEntry1] : 28 yo RHD F  who is referred from [facility] who is here today for evaluation of left wrist "lump" that has increased in size x 1 month and is associated with discomfort.   She reports the pain is exacerbated with wrist extension and lifting heavy objects; discomfort/pain not relieved with Tylenol PRN.  Denies antecedent hand trauma or prior hand surgeries.  c/o associated numbness and tingling over thenar area.\par \par Recently started on prednisone 2 weeks ago for cough.  Denies diagnosis of asthma. \par Occupation: unemployed, used to work at dry \par \par She is here today to discuss treatment options.\par \par Denies MRSA skin infections and VTE\par [place] phone : Gordon [phone]\par \par Interval hx (10/23/19). Patient presents today POD#8 s/p excision of left volar wrist ganglion c/o incisional discomfort, now improving. Compliant with hand rest and elevation. Denies any fever, chills or bleeding from the site. \par \par Interval hx (10/30/19): Pt presents for f/u and suture removal- POD #15. C/o mild residual swelling. Denies significant pain or f/c.\par \par Interval hx (19). Patient presents today 2 months s/p excision of left volar wrist ganglion cyst c/o discomfort and heightened sensitivity at surgical site. Denies any trauma to the wrist. \par \par Interval hx (20):  Here for 3 month s/p left volar wrist ganglion cyst excision follow up. c/o continued scar sensitivity, but is better than last office visit.  pt compliant with scar massage and nightly silicone sheeting.  Denies any wrist ROM deficits

## 2020-01-06 NOTE — ASSESSMENT
[FreeTextEntry1] : 30 yo RHD F with symptomatic left volar wrist ganglion cyst now 3 months s/p excision with slowly improving hypersensitive hypertrophic scar. \par \par -c/w ScarAway silicone sheeting  to incision (HTS)\par -c/w aggressive scar massage; encouraged firmer massage technique (TID or more frequent)\par -OT for desensitization tx and scar mgmt; pt has not yet made appt.  Pt counseled and encouraged to obtain OT/hand therapy.\par -all questions were answered\par -Follow up in 2 months or sooner as needed

## 2020-01-06 NOTE — PHYSICAL EXAM
[de-identified] : well-appearing, NAD [de-identified] : left volar wrist incision healed with minor hypertrophic scar with hypersensitivity to touch, FROM at wrist, otherwise sensory exam normal

## 2020-01-13 ENCOUNTER — APPOINTMENT (OUTPATIENT)
Dept: GASTROENTEROLOGY | Facility: CLINIC | Age: 30
End: 2020-01-13

## 2020-02-11 ENCOUNTER — OUTPATIENT (OUTPATIENT)
Dept: OUTPATIENT SERVICES | Facility: HOSPITAL | Age: 30
LOS: 1 days | Discharge: HOME | End: 2020-02-11

## 2020-02-11 ENCOUNTER — APPOINTMENT (OUTPATIENT)
Dept: OBGYN | Facility: CLINIC | Age: 30
End: 2020-02-11
Payer: MEDICAID

## 2020-02-11 VITALS
SYSTOLIC BLOOD PRESSURE: 109 MMHG | WEIGHT: 156.56 LBS | DIASTOLIC BLOOD PRESSURE: 70 MMHG | BODY MASS INDEX: 28.81 KG/M2 | HEIGHT: 62 IN

## 2020-02-11 DIAGNOSIS — Z98.890 OTHER SPECIFIED POSTPROCEDURAL STATES: Chronic | ICD-10-CM

## 2020-02-11 PROCEDURE — 11981 INSERTION DRUG DLVR IMPLANT: CPT

## 2020-02-13 DIAGNOSIS — Z30.017 ENCOUNTER FOR INITIAL PRESCRIPTION OF IMPLANTABLE SUBDERMAL CONTRACEPTIVE: ICD-10-CM

## 2020-03-17 NOTE — ASSESSMENT
[FreeTextEntry1] : 28 yo RHD with symptomatic left volar wrist ganglion cyst now 2 months s/p excision with hypersensitive scar.\par \par -Scarguard to scar\par -Scar massage\par -OT for desensitization tx\par \par

## 2020-03-17 NOTE — HISTORY OF PRESENT ILLNESS
[FreeTextEntry1] : 28 yo RHD F  who is referred from [facility] who is here today for evaluation of left wrist "lump" that has increased in size x 1 month and is associated with discomfort.   She reports the pain is exacerbated with wrist extension and lifting heavy objects; discomfort/pain not relieved with Tylenol PRN.  Denies antecedent hand trauma or prior hand surgeries.  c/o associated numbness and tingling over thenar area.\par \par Recently started on prednisone 2 weeks ago for cough.  Denies diagnosis of asthma. \par Occupation: unemployed, used to work at dry \par \par She is here today to discuss treatment options.\par \par Denies MRSA skin infections and VTE\par Citizen of [place] phone : Gordon [phone]\par \par Interval hx (10/23/19). Patient presents today POD#8 s/p excision of left volar wrist ganglion c/o incisional discomfort, now improving. Compliant with hand rest and elevation. Denies any fever, chills or bleeding from the site. \par \par Interval hx (10/30/19): Pt presents for f/u and suture removal- POD #15. C/o mild residual swelling. Denies significant pain or f/c.\par \par Interval hx (19). Patient presents today 2 months s/p excision of left volar wrist ganglion cyst c/o discomfort at surgical site associated with sensitivity, especially on contact. Denies any trauma to the site.

## 2020-03-17 NOTE — PHYSICAL EXAM
[de-identified] : well-appearing, NAD [de-identified] : left volar wrist incision healed with hypertrophic scar, hypersensitive to touch with minimal soft tissue swelling periwound, FROM with otherwise normal sensory exam

## 2020-03-17 NOTE — DATA REVIEWED
[FreeTextEntry1] : \par  Pathology             Final\par \par No Documents Attached\par \par \par \par \par   [facility] Accession Number : [id number]\par \par AMIRA MCGUIRE\par \par \par \par Surgical Final Report\par \par \par \par \par Final Diagnosis\par Left volar wrist ganglion cyst, excision:\par - Ganglion cyst.\par \par Verified by: Diane Lopez M.D.\par (Electronic Signature)\par Reported on: 10/17/19 17:14 EDT, [address] [place] [address],[facility] [address]\par Phone: [phone]   Fax: [phone]\par _________________________________________________________________\par \par Clinical History\par Excision of left volar wrist ganglion cyst (radial wrist)\par \par Specimen(s) Submitted\par Left volar wrist ganglion cyst\par \par Gross Description\par The specimen is received in formalin, labeled "left volar wrist\par ganglion cyst" and consists of one elongated gray white soft\par tissue measuring 1.3 x 0.2 x 0.2 cm. The specimen is submitted\par entirely. (1 block)\par \par Specimen was received and underwent gross examination at [facility], [address] [place] [address],[facility] [address].\par \par 10/15/19 11:54 ns\par \par Perioperative Diagnosis\par Left volar wrist ganglion cyst\par \par  \par \par  Ordered by: ADONIS EGAN       Collected/Examined: 15Oct2019 08:04AM       \par Verified by: SANDRA BORJA 30Oct2019 10:06AM       \par  Result Communication: Discussed results with patient;\par Stage: Final       \par  Performed at: [facility]       Resulted: 17Oct2019 05:14PM       Last Updated: 30Oct2019 10:06AM       Accession: [id number]

## 2020-03-24 ENCOUNTER — APPOINTMENT (OUTPATIENT)
Dept: GASTROENTEROLOGY | Facility: CLINIC | Age: 30
End: 2020-03-24

## 2020-06-08 ENCOUNTER — APPOINTMENT (OUTPATIENT)
Dept: PLASTIC SURGERY | Facility: CLINIC | Age: 30
End: 2020-06-08
Payer: MEDICAID

## 2020-06-08 DIAGNOSIS — M67.432 GANGLION, LEFT WRIST: ICD-10-CM

## 2020-06-08 DIAGNOSIS — L91.0 HYPERTROPHIC SCAR: ICD-10-CM

## 2020-06-08 PROCEDURE — 99212 OFFICE O/P EST SF 10 MIN: CPT

## 2020-06-08 NOTE — PHYSICAL EXAM
[de-identified] : well-appearing, NAD [de-identified] : left volar wrist incision healed with minor hypertrophic scar with no longer tender or hypersensitive to touch, FROM at wrist, otherwise sensory exam normal

## 2020-06-08 NOTE — ASSESSMENT
[FreeTextEntry1] : 31 yo RHD F with symptomatic left volar wrist ganglion cyst now 9 months s/p excision with resolved hypersensitive hypertrophic scar. \par \par No active PRS issues\par \par -c/w ScarAway silicone sheeting  to incision (HTS) x 1-2 month\par -c/w aggressive scar massage\par -OT no longer needed\par -all questions were answered\par -Follow up PRN

## 2020-06-08 NOTE — HISTORY OF PRESENT ILLNESS
[FreeTextEntry1] : 28 yo RHD F  who is referred from [facility] who is here today for evaluation of left wrist "lump" that has increased in size x 1 month and is associated with discomfort.   She reports the pain is exacerbated with wrist extension and lifting heavy objects; discomfort/pain not relieved with Tylenol PRN.  Denies antecedent hand trauma or prior hand surgeries.  c/o associated numbness and tingling over thenar area.\par \par Recently started on prednisone 2 weeks ago for cough.  Denies diagnosis of asthma. \par Occupation: unemployed, used to work at dry \par \par She is here today to discuss treatment options.\par \par Denies MRSA skin infections and VTE\par Serbian phone : Gordon [phone]\par \par Interval hx (10/23/19). Patient presents today POD#8 s/p excision of left volar wrist ganglion c/o incisional discomfort, now improving. Compliant with hand rest and elevation. Denies any fever, chills or bleeding from the site. \par \par Interval hx (10/30/19): Pt presents for f/u and suture removal- POD #15. C/o mild residual swelling. Denies significant pain or f/c.\par \par Interval hx (19). Patient presents today 2 months s/p excision of left volar wrist ganglion cyst c/o discomfort and heightened sensitivity at surgical site. Denies any trauma to the wrist. \par \par Interval hx (20):  Here for 3 month s/p left volar wrist ganglion cyst excision follow up. c/o continued scar sensitivity, but is better than last office visit.  pt compliant with scar massage and nightly silicone sheeting.  Denies any wrist ROM deficits\par \par Interval hx (20):  Here for 9 month s/p left volar wrist ganglion cyst excision.  Here for scar follow up.  She reports of of silicone sheeting nightly up until 2 months ago.  She has been performing scar massage. Pt did not go to OHT for scar therapy 2/2 COVID pandemic, and she was observing improvement in scar sensitivity.  Denies new scar changes.

## 2020-09-01 NOTE — ED ADULT NURSE NOTE - TEMPLATE LIST FOR HEAD TO TOE ASSESSMENT
ASSESSMENT/PLAN     ASHD (arteriosclerotic heart disease)  (primary encounter diagnosis)  Comment: Stable, chest pain stopped.   Plan: Continue plavix and atorvastatin    Need for vaccination  Comment: Due for fluvax  Plan: INFLUENZA QUADRIVALENT SPLIT PRES FREE 0.5 ML         VACC, IM (FLULAVAL,FLUARIX,FLUZONE)            Essential hypertension  Comment:  Blood pressure is stable and well controlled  Plan: CBC WITH DIFFERENTIAL, COMPREHENSIVE METABOLIC         PANEL        Continue lisinopril metoprolol    IFG (impaired fasting glucose)  Comment:  A1c roughly stable at 5.8  Plan: COMPREHENSIVE METABOLIC PANEL, GLYCOHEMOGLOBIN        Avoid sweets in the diet    Hyperlipidemia, mixed  Comment:  Cholesterol stable and well controlled  Plan: LIPID PANEL WITH REFLEX        Continue atorvastatin    Vitamin D deficiency  Comment:  Due for vitamin-D checked next visit  Plan: VITAMIN D -25 HYDROXY            Scrotal mass  Comment:  Scrotal mass evaluated by urology year ago felt to be benign  Plan:  Clinical follow-up      Bradycardia  Comment:  Sinus bradycardia probably ineffective the metoprolol  Plan: ELECTROCARDIOGRAM 12-LEAD        Continue current therapy    RLQ abdominal mass  Comment:  Vague fullness in the right lower quadrant  Plan: CT ABDOMEN W CONTRAST        Will try to get the abdominal pelvic CT.    Return to clinic in 6 months pending results and retest lab.   OB/GYN

## 2020-09-10 ENCOUNTER — APPOINTMENT (OUTPATIENT)
Dept: OBGYN | Facility: CLINIC | Age: 30
End: 2020-09-10
Payer: MEDICAID

## 2020-09-10 ENCOUNTER — OUTPATIENT (OUTPATIENT)
Dept: OUTPATIENT SERVICES | Facility: HOSPITAL | Age: 30
LOS: 1 days | Discharge: HOME | End: 2020-09-10

## 2020-09-10 VITALS — DIASTOLIC BLOOD PRESSURE: 60 MMHG | BODY MASS INDEX: 27.25 KG/M2 | SYSTOLIC BLOOD PRESSURE: 100 MMHG | WEIGHT: 149 LBS

## 2020-09-10 DIAGNOSIS — Z78.9 OTHER SPECIFIED HEALTH STATUS: ICD-10-CM

## 2020-09-10 DIAGNOSIS — Z98.890 OTHER SPECIFIED POSTPROCEDURAL STATES: Chronic | ICD-10-CM

## 2020-09-10 DIAGNOSIS — Z86.2 PERSONAL HISTORY OF DISEASES OF THE BLOOD AND BLOOD-FORMING ORGANS AND CERTAIN DISORDERS INVOLVING THE IMMUNE MECHANISM: ICD-10-CM

## 2020-09-10 DIAGNOSIS — Z30.017 ENCOUNTER FOR INITIAL PRESCRIPTION OF IMPLANTABLE SUBDERMAL CONTRACEPTIVE: ICD-10-CM

## 2020-09-10 DIAGNOSIS — Z30.42 ENCOUNTER FOR SURVEILLANCE OF INJECTABLE CONTRACEPTIVE: ICD-10-CM

## 2020-09-10 DIAGNOSIS — Z87.59 PERSONAL HISTORY OF OTHER COMPLICATIONS OF PREGNANCY, CHILDBIRTH AND THE PUERPERIUM: ICD-10-CM

## 2020-09-10 PROCEDURE — 99395 PREV VISIT EST AGE 18-39: CPT

## 2020-09-18 PROBLEM — Z86.2 HISTORY OF ANEMIA: Status: RESOLVED | Noted: 2019-10-07 | Resolved: 2020-09-18

## 2020-09-18 PROBLEM — Z30.42 ENCOUNTER FOR MANAGEMENT AND INJECTION OF INJECTABLE PROGESTIN CONTRACEPTIVE: Status: RESOLVED | Noted: 2019-09-05 | Resolved: 2020-09-18

## 2020-09-18 PROBLEM — Z78.9 PATIENT DENIES MEDICAL PROBLEMS: Status: RESOLVED | Noted: 2019-08-08 | Resolved: 2020-09-18

## 2020-09-18 PROBLEM — Z30.017 NEXPLANON INSERTION: Status: RESOLVED | Noted: 2020-02-11 | Resolved: 2020-09-18

## 2020-09-18 PROBLEM — Z87.59 HISTORY OF SPONTANEOUS ABORTION: Status: RESOLVED | Noted: 2019-08-22 | Resolved: 2020-09-18

## 2020-09-18 NOTE — PHYSICAL EXAM
[Awake] : awake [Alert] : alert [Soft] : soft [Oriented x3] : oriented to person, place, and time [Normal] : uterus [No Bleeding] : there was no active vaginal bleeding [Normal Position] : in a normal position [Uterine Adnexae] : were not tender and not enlarged [Acute Distress] : no acute distress [Mass] : no breast mass [Nipple Discharge] : no nipple discharge [Axillary LAD] : no axillary lymphadenopathy [Tender] : non tender [Discharge] : had no discharge [Motion Tenderness] : there was no cervical motion tenderness [Adnexa Tenderness] : were not tender [Ovarian Mass (___ Cm)] : there were no adnexal masses

## 2021-05-25 NOTE — ED PROVIDER NOTE - CLINICAL SUMMARY MEDICAL DECISION MAKING FREE TEXT BOX
Yes
28 yo woman with inevitable AB on arrival which was delivered by OB at bedside.  Sent to pathology.  Outpatient follow up.  No active bleeding in ED at this time.  Bleeding precautions provided.

## 2021-12-30 ENCOUNTER — APPOINTMENT (OUTPATIENT)
Dept: OBGYN | Facility: CLINIC | Age: 31
End: 2021-12-30
Payer: COMMERCIAL

## 2021-12-30 ENCOUNTER — OUTPATIENT (OUTPATIENT)
Dept: OUTPATIENT SERVICES | Facility: HOSPITAL | Age: 31
LOS: 1 days | Discharge: HOME | End: 2021-12-30

## 2021-12-30 VITALS
SYSTOLIC BLOOD PRESSURE: 110 MMHG | BODY MASS INDEX: 29.63 KG/M2 | WEIGHT: 161 LBS | DIASTOLIC BLOOD PRESSURE: 70 MMHG | HEIGHT: 62 IN

## 2021-12-30 DIAGNOSIS — Z00.00 ENCOUNTER FOR GENERAL ADULT MEDICAL EXAMINATION W/OUT ABNORMAL FINDINGS: ICD-10-CM

## 2021-12-30 DIAGNOSIS — Z01.419 ENCOUNTER FOR GYNECOLOGICAL EXAMINATION (GENERAL) (ROUTINE) W/OUT ABNORMAL FINDINGS: ICD-10-CM

## 2021-12-30 DIAGNOSIS — Z30.46 ENCOUNTER FOR SURVEILLANCE OF IMPLANTABLE SUBDERMAL CONTRACEPTIVE: ICD-10-CM

## 2021-12-30 DIAGNOSIS — Z30.9 ENCOUNTER FOR CONTRACEPTIVE MANAGEMENT, UNSPECIFIED: ICD-10-CM

## 2021-12-30 DIAGNOSIS — Z98.890 OTHER SPECIFIED POSTPROCEDURAL STATES: Chronic | ICD-10-CM

## 2021-12-30 PROCEDURE — 99395 PREV VISIT EST AGE 18-39: CPT | Mod: 25

## 2021-12-30 PROCEDURE — 11982 REMOVE DRUG IMPLANT DEVICE: CPT

## 2021-12-30 RX ORDER — CHLORHEXIDINE GLUCONATE 4 %
325 (65 FE) LIQUID (ML) TOPICAL
Refills: 0 | Status: DISCONTINUED | COMMUNITY
End: 2021-12-30

## 2021-12-30 RX ORDER — CLINDAMYCIN HYDROCHLORIDE 300 MG/1
CAPSULE ORAL
Refills: 0 | Status: DISCONTINUED | COMMUNITY
End: 2021-12-30

## 2021-12-30 RX ORDER — FEXOFENADINE HYDROCHLORIDE 180 MG/1
TABLET ORAL
Refills: 0 | Status: DISCONTINUED | COMMUNITY
End: 2021-12-30

## 2021-12-30 NOTE — REVIEW OF SYSTEMS
[Recent Weight Gain (___ Lbs)] : recent [unfilled] ~Ulb weight gain [Fever] : no fever [Fatigue] : no fatigue [Dyspnea] : no dyspnea [Cough] : no cough [SOB on Exertion] : no shortness of breath on exertion [Chest Pain] : no chest pain [Palpitations] : no palpitations [Lower Ext Edema] : no lower extremity edema [Abdominal Pain] : no abdominal pain [Constipation] : no constipation [Diarrhea] : diarrhea [Vomiting] : no vomiting [Nausea] : no nausea [Urgency] : no urgency [Frequency] : no frequency [Dysuria] : no dysuria [Abn Vaginal bleeding] : no abnormal vaginal bleeding [Pelvic pain] : no pelvic pain

## 2021-12-30 NOTE — PLAN
[FreeTextEntry1] : \par 32yo P2 LMP 12/20/21 s/p nexplanon removal, desiring fertility in the near future, doing well\par \par -Pap smear due next year\par -6 mos supply of xulane patch given for interim contraception\par -preconception counselling given, patient told to start taking prenatal vitamins, avoid alcohol/smoking, and weight loss. Counselled on optimal timing of intercourse for conception. Patient voiced an understanding\par -RTC in 1 yr or PRN

## 2021-12-30 NOTE — PHYSICAL EXAM
[Appropriately responsive] : appropriately responsive [Alert] : alert [No Acute Distress] : no acute distress [No Lymphadenopathy] : no lymphadenopathy [Regular Rate Rhythm] : regular rate rhythm [No Murmurs] : no murmurs [Clear to Auscultation B/L] : clear to auscultation bilaterally [Soft] : soft [Non-tender] : non-tender [Non-distended] : non-distended [No HSM] : No HSM [No Lesions] : no lesions [No Mass] : no mass [Oriented x3] : oriented x3 [Examination Of The Breasts] : a normal appearance [No Masses] : no breast masses were palpable [Labia Majora] : normal [Labia Minora] : normal [Normal] : normal [Uterine Adnexae] : normal [Tenderness] : nontender

## 2021-12-30 NOTE — COUNSELING
[Vitamins/Supplements] : vitamins/supplements [Drugs/Alcohol] : drugs, alcohol [Contraception/ Emergency Contraception/ Safe Sexual Practices] : contraception, emergency contraception, safe sexual practices [Preconception Care/ Fertility options] : preconception care, fertility options [STD (testing, results, tx)] : STD (testing, results, tx)

## 2021-12-30 NOTE — HISTORY OF PRESENT ILLNESS
[Patient reported PAP Smear was normal] : Patient reported PAP Smear was normal [HIV test declined] : HIV test declined [Syphilis test declined] : Syphilis test declined [Gonorrhea test declined] : Gonorrhea test declined [Chlamydia test declined] : Chlamydia test declined [Trichomonas test declined] : Trichomonas test declined [HPV test declined] : HPV test declined [Hepatitis B test declined] : Hepatitis B test declined [Hepatitis C test declined] : Hepatitis C test declined [N] : Patient reports normal menses [Y] : Positive pregnancy history [FreeTextEntry1] : 30yo , LMP 22, presenting for annual exam. Doing well, no acute complaints. Denies fever, chills, CP, SOB, N/V, abdominal pain, abnormal vaginal bleeding/discharge, or urinary symptoms. \par \par On nexplanon for birth control, placed . Desires fertility in the near future, would like to have nexplanon removed today and start contraceptive patches for a short period before attempting to conceive.\par \par Sexually active with one male partner, . Declines STD testing today. \par \par Last pap: 2019 NILM\par \par Obhx: \par Gynhx: Denies h/o fibroids, cysts, abnormal paps, or STIs\par  [PapSmeardate] : 08/19 [LMPDate] : 12/20/21 [MensesFreq] : 22 [MensesLength] : 7 [PGHxTotal] : 3 [[facility] [place]Full[facility]] : 2 [[facility]Living] : 2 [PGHxABSpont] : 1

## 2022-06-05 NOTE — ED ADULT NURSE NOTE - TEMPLATE LIST FOR HEAD TO TOE ASSESSMENT
[FreeTextEntry1] : CML on remission\par \par august US and MRI 2021 showed 7mm body/tail panc lesion suspicious for carcinoma\par \par EUS and bx showed benign panc tissue, discussed with dr Rodriguez who did not see specific tumor \par \par CT pancreas protocol and PET scan showed no definite pancreatic anatomical or avid lesions\par \par discussed at GI tumor board and consensus to repeat MRI in couple months , will repeat MRI now to assure patient and if denied by insurance will repeat MRI in couple months, patient agreed with above\par \par 1/18/2022 repeated MRI MRCP showed no pancreatic masses, however, still omental nodule and patient had 4 pounds weight loss over last 4 months, will proceed with diagnostic laparoscopy and excision of LUQ omental nodule for tissue diagnosis \par \par \par 2/9/2022 diagnostic laparoscopy and partial omentectomy and liver biopsy\par 1. Left liver bed biopsy:\par - Nodule of liver tissue showing extensive micro/macrovesicular steatosis,\par occupying approximately 70% tissue volume of the biopsy.\par \par - The liver parenchyma also showing minimal lobulitis with reactive\par hepatocytes and glycogeniated nucleus.\par - Portal area showing minimal chronic inflammation with fibrotic expansion\par and septal formation (fibrosis staging,  2 out of 4).\par -  No increased stainable iron seen in this material.\par - Trichrome and reticulin stains reviewed.\par \par 2. Omentum:\par - Omental mature fibroadipose tissue showing congestion and focal\par hemorrhage with focal reactive mesothelium on the surface.\par - No neoplasm  seen.\par \par 2/24/2022 first posop visit , asymptomatic, discussed these findings with patient and her med onc, will see her in 3-6 months with new MRI pancreas\par \par \par 6/2/2022 she reported no new symptoms, MRI 5/19/2022 showed No pancreatic lesion identified. No ductal dilatation. will repeat MRI in one year, discussed the plan with dr Bonilla as well General

## 2023-01-26 NOTE — ED PROVIDER NOTE - DISPOSITION TYPE
67 male with hx of prostate cancer with metastasis to bone and lung, HTN, presents to the ED for low hemoglobin.     Metastatic prostate cancer  - pt currently being followed by Dr. 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
DISCHARGE

## 2023-03-02 ENCOUNTER — OUTPATIENT (OUTPATIENT)
Dept: OUTPATIENT SERVICES | Facility: HOSPITAL | Age: 33
LOS: 1 days | End: 2023-03-02
Payer: COMMERCIAL

## 2023-03-02 DIAGNOSIS — K02.9 DENTAL CARIES, UNSPECIFIED: ICD-10-CM

## 2023-03-02 DIAGNOSIS — Z98.890 OTHER SPECIFIED POSTPROCEDURAL STATES: Chronic | ICD-10-CM

## 2023-03-02 PROCEDURE — D0140: CPT

## 2023-03-02 PROCEDURE — D0330: CPT

## 2023-03-06 DIAGNOSIS — K02.63 DENTAL CARIES ON SMOOTH SURFACE PENETRATING INTO PULP: ICD-10-CM

## 2023-07-26 ENCOUNTER — EMERGENCY (EMERGENCY)
Facility: HOSPITAL | Age: 33
LOS: 0 days | Discharge: HOME | End: 2023-07-26
Attending: EMERGENCY MEDICINE
Payer: MEDICAID

## 2023-07-26 VITALS
TEMPERATURE: 98 F | HEIGHT: 65 IN | WEIGHT: 160.06 LBS | RESPIRATION RATE: 18 BRPM | SYSTOLIC BLOOD PRESSURE: 128 MMHG | DIASTOLIC BLOOD PRESSURE: 74 MMHG | OXYGEN SATURATION: 100 % | HEART RATE: 84 BPM

## 2023-07-26 DIAGNOSIS — Z98.890 OTHER SPECIFIED POSTPROCEDURAL STATES: Chronic | ICD-10-CM

## 2023-07-26 DIAGNOSIS — M25.512 PAIN IN LEFT SHOULDER: ICD-10-CM
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
## 2023-07-26 PROCEDURE — 99283 EMERGENCY DEPT VISIT LOW MDM: CPT | Mod: 25

## 2023-07-26 PROCEDURE — 99284 EMERGENCY DEPT VISIT MOD MDM: CPT

## 2023-07-26 PROCEDURE — 73030 X-RAY EXAM OF SHOULDER: CPT | Mod: 26,LT

## 2023-07-26 PROCEDURE — 73030 X-RAY EXAM OF SHOULDER: CPT | Mod: LT

## 2023-07-26 RX ORDER — METHOCARBAMOL 500 MG/1
2 TABLET, FILM COATED ORAL
Qty: 30 | Refills: 0
Start: 2023-07-26 | End: 2023-07-30

## 2023-07-26 RX ORDER — IBUPROFEN 200 MG
600 TABLET ORAL ONCE
Refills: 0 | Status: COMPLETED | OUTPATIENT
Start: 2023-07-26 | End: 2023-07-26

## 2023-07-26 RX ORDER — LIDOCAINE 4 G/100G
1 CREAM TOPICAL
Qty: 5 | Refills: 0
Start: 2023-07-26 | End: 2023-07-30

## 2023-07-26 RX ORDER — LIDOCAINE 4 G/100G
1 CREAM TOPICAL ONCE
Refills: 0 | Status: COMPLETED | OUTPATIENT
Start: 2023-07-26 | End: 2023-07-26

## 2023-07-26 RX ADMIN — LIDOCAINE 1 PATCH: 4 CREAM TOPICAL at 21:39

## 2023-07-26 RX ADMIN — Medication 600 MILLIGRAM(S): at 21:37

## 2023-07-26 NOTE — ED PROVIDER NOTE - NS ED ATTENDING STATEMENT MOD
This was a shared visit with the BERENICE. I reviewed and verified the documentation and independently performed the documented:

## 2023-07-26 NOTE — ED PROVIDER NOTE - OBJECTIVE STATEMENT
33-year-old female with no significant past medical history who presents with left shoulder pain.  Reports that symptoms started 3-month ago after she lifted a weight in the gym and pain has been progressively getting worse since 2 weeks ago; pain is constant and there is no alleviating or worsening factor.  Reports that she has been taking ibuprofen for her pain, but symptoms did not improve.  Denies fever, shortness breath, chest pain, and apparent discomfort elsewhere.    [id number]

## 2023-07-26 NOTE — ED PROVIDER NOTE - NSFOLLOWUPINSTRUCTIONS_ED_ALL_ED_FT
Please make sure to follow up with your primary care doctor in 3 days.    Shoulder Pain    AMBULATORY CARE:    Shoulder pain is a common problem that can affect your daily activities. Pain can be caused by a problem within your shoulder, such as soreness of a tendon or bursa. A tendon is a cord of tough tissue that connects your muscles to your bones. The bursa is a fluid-filled sac that acts as a cushion between a bone and a tendon. Shoulder pain may also be caused by pain that spreads to your shoulder from another part of your body.Shoulder Anatomy         Seek care immediately if:     You have severe pain.      You cannot move your arm or shoulder.      You have numbness or tingling in your shoulder or arm.    Contact your healthcare provider if:     Your pain gets worse or does not go away with treatment.      You have trouble moving your arm or shoulder.      You have questions or concerns about your condition or care.    Treatment for shoulder pain may include any of the following:     Acetaminophen decreases pain and fever. It is available without a doctor's order. Ask how much to take and how often to take it. Follow directions. Read the labels of all other medicines you are using to see if they also contain acetaminophen, or ask your doctor or pharmacist. Acetaminophen can cause liver damage if not taken correctly. Do not use more than 4 grams (4,000 milligrams) total of acetaminophen in one day.       NSAIDs, such as ibuprofen, help decrease swelling, pain, and fever. This medicine is available with or without a doctor's order. NSAIDs can cause stomach bleeding or kidney problems in certain people. If you take blood thinner medicine, always ask your healthcare provider if NSAIDs are safe for you. Always read the medicine label and follow directions.      A steroid injection may help decrease pain and swelling.      Surgery may be needed for long-term pain and loss of function.    Manage your symptoms:     Apply ice on your shoulder for 20 to 30 minutes every 2 hours or as directed. Use an ice pack, or put crushed ice in a plastic bag. Cover it with a towel before you apply it to your shoulder. Ice helps prevent tissue damage and decreases swelling and pain.      Apply heat if ice does not help your symptoms. Apply heat on your shoulder for 20 to 30 minutes every 2 hours for as many days as directed. Heat helps decrease pain and muscle spasms.      Limit activities as directed. Try to avoid repeated overhead movements.      Go to physical or occupational therapy as directed. A physical therapist teaches you exercises to help improve movement and strength, and to decrease pain. An occupational therapist teaches you skills to help with your daily activities.     Prevent shoulder pain:     Maintain a good range of motion in your shoulder. Ask your healthcare provider which exercises you should do on a regular basis after you have healed.       Stretch and strengthen your shoulder. Use proper technique during exercises and sports.    Follow up with your healthcare provider or orthopedist as directed: Write down your questions so you remember to ask them during your visits.

## 2023-07-26 NOTE — ED PROVIDER NOTE - PHYSICAL EXAMINATION
CONSTITUTIONAL: Well-appearing; in no apparent distress.   ENT: Hearing is intact with good acuity to spoken voice.  Patient is speaking clearly, not muffled and airway is intact.   RESPIRATORY: No signs of respiratory distress. Lung sounds are clear in all lobes bilaterally without rales, rhonchi, or wheezes.  CARDIOVASCULAR: Regular rate and rhythm.   MS: L shoulder with no obvious deformity or swelling; nontender to palpation; full ROM; sensory function intact; distal pulse present. Rest of the upper and lower extremities unremarkable. Steady gait noted.   NEURO: A & O x 3. Normal speech. No focal deficit.  PSYCHOLOGICAL: Appropriate mood and affect. Good judgement and insight.

## 2023-07-26 NOTE — ED ADULT NURSE NOTE - CHIEF COMPLAINT QUOTE
I have a lot of pain in my arm, I hurt it in the gym a few months ago, maybe three months and now its hurting more. My doctor gave me Meloxicam three months and its not working - patient  Patient reports injury from lifting weights

## 2023-07-26 NOTE — ED PROVIDER NOTE - CLINICAL SUMMARY MEDICAL DECISION MAKING FREE TEXT BOX
33 y.o. female, no PMH, c/o left shoulder pain.  Reports that symptoms started  a month ago after she lifted weights in the gym and pain has been progressively getting worse since 2 weeks ago; pain is constant and there is no alleviating or worsening factor.  Reports that she has been taking ibuprofen for her pain, but symptoms did not improve.  Denies fever, shortness breath, chest pain, and apparent discomfort elsewhere. No direct drauma. No difficulty moving her shoulder but pain is worse with movement. On exam, pt in NAD, AAOx3, head NC/AT, CN II-XII intact, PEERL, EOMi, neck (-) midline tenderness, lungs CTA B/L, CV S1S2 regular, abdomen soft/NT/ND/(+)BS, ext (-) edema, FROM of left shoulder active and passive, (-) TTP over SCJ/clavicle/ACJ, (-) skin changes/swelling, FROM of elbow, good , pulses intact, ambulating with steady gait. XR (-). Most likely MSK pain. Will d/c with PT and ortho follow up. Pain treated.

## 2023-07-26 NOTE — ED PROVIDER NOTE - CARE PROVIDER_API CALL
Maxwell Solitario  Orthopaedic Surgery  [address] [place] [address]  Phone: [phone]  Fax: [phone]  Follow Up Time: 1-3 Days

## 2023-07-26 NOTE — ED PROVIDER NOTE - PROGRESS NOTE DETAILS
X-ray unremarkable.  Meds given in the ED.  Patient is stable for discharge.  We will have patient follow-up with Ortho outpatient.  We will send meds to the pharmacy.

## 2023-07-26 NOTE — ED ADULT TRIAGE NOTE - CHIEF COMPLAINT QUOTE
I have a lot of pain in my arm, I hurt it in the gym a few months ago, maybe three months and now its hurting more. My doctor gave me Meloxicam three months and its not working - patient I have a lot of pain in my arm, I hurt it in the gym a few months ago, maybe three months and now its hurting more. My doctor gave me Meloxicam three months and its not working - patient  Patient reports injury from lifting weights

## 2023-07-26 NOTE — ED ADULT NURSE NOTE - NSFALLUNIVINTERV_ED_ALL_ED
Bed/Stretcher in lowest position, wheels locked, appropriate side rails in place/Call bell, personal items and telephone in reach/Instruct patient to call for assistance before getting out of bed/chair/stretcher/Non-slip footwear applied when patient is off stretcher/[place] to call system/Physically safe environment - no spills, clutter or unnecessary equipment/Purposeful proactive rounding/Room/bathroom lighting operational, light cord in reach

## 2023-07-26 NOTE — ED PROVIDER NOTE - PATIENT PORTAL LINK FT
You can access the FollowMyHealth Patient Portal offered by [facility] by registering at the following website: [email or web address]. By joining [employer]’s FollowMyHealth portal, you will also be able to view your health information using other applications (apps) compatible with our system.

## 2023-08-03 ENCOUNTER — NON-APPOINTMENT (OUTPATIENT)
Age: 33
End: 2023-08-03

## 2023-08-03 ENCOUNTER — RESULT CHARGE (OUTPATIENT)
Age: 33
End: 2023-08-03

## 2023-08-10 LAB — HCG UR QL: POSITIVE

## 2023-08-31 ENCOUNTER — APPOINTMENT (OUTPATIENT)
Dept: OBGYN | Facility: CLINIC | Age: 33
End: 2023-08-31

## 2023-08-31 ENCOUNTER — APPOINTMENT (OUTPATIENT)
Dept: OBGYN | Facility: CLINIC | Age: 33
End: 2023-08-31
Payer: MEDICAID

## 2023-08-31 ENCOUNTER — OUTPATIENT (OUTPATIENT)
Dept: OUTPATIENT SERVICES | Facility: HOSPITAL | Age: 33
LOS: 1 days | End: 2023-08-31
Payer: MEDICAID

## 2023-08-31 VITALS
WEIGHT: 159 LBS | HEIGHT: 62 IN | SYSTOLIC BLOOD PRESSURE: 110 MMHG | DIASTOLIC BLOOD PRESSURE: 71 MMHG | BODY MASS INDEX: 29.26 KG/M2

## 2023-08-31 DIAGNOSIS — Z34.90 ENCOUNTER FOR SUPERVISION OF NORMAL PREGNANCY, UNSPECIFIED, UNSPECIFIED TRIMESTER: ICD-10-CM

## 2023-08-31 DIAGNOSIS — Z34.91 ENCOUNTER FOR SUPERVISION OF NORMAL PREGNANCY, UNSPECIFIED, FIRST TRIMESTER: ICD-10-CM

## 2023-08-31 DIAGNOSIS — Z98.890 OTHER SPECIFIED POSTPROCEDURAL STATES: Chronic | ICD-10-CM

## 2023-08-31 PROCEDURE — 87491 CHLMYD TRACH DNA AMP PROBE: CPT

## 2023-08-31 PROCEDURE — 81002 URINALYSIS NONAUTO W/O SCOPE: CPT

## 2023-08-31 PROCEDURE — 87591 N.GONORRHOEAE DNA AMP PROB: CPT

## 2023-08-31 PROCEDURE — 76857 US EXAM PELVIC LIMITED: CPT

## 2023-08-31 PROCEDURE — 99214 OFFICE O/P EST MOD 30 MIN: CPT

## 2023-08-31 PROCEDURE — 76830 TRANSVAGINAL US NON-OB: CPT | Mod: 26,59

## 2023-08-31 PROCEDURE — 87661 TRICHOMONAS VAGINALIS AMPLIF: CPT

## 2023-08-31 PROCEDURE — 88142 CYTOPATH C/V THIN LAYER: CPT

## 2023-08-31 RX ORDER — PSEUDOEPHEDRINE HCL 30 MG
27-0.8 TABLET ORAL DAILY
Qty: 100 | Refills: 2 | Status: ACTIVE | COMMUNITY
Start: 2023-08-31 | End: 1900-01-01

## 2023-09-01 ENCOUNTER — OUTPATIENT (OUTPATIENT)
Dept: OUTPATIENT SERVICES | Facility: HOSPITAL | Age: 33
LOS: 1 days | End: 2023-09-01

## 2023-09-01 DIAGNOSIS — Z98.890 OTHER SPECIFIED POSTPROCEDURAL STATES: Chronic | ICD-10-CM

## 2023-09-01 DIAGNOSIS — Z34.91 ENCOUNTER FOR SUPERVISION OF NORMAL PREGNANCY, UNSPECIFIED, FIRST TRIMESTER: ICD-10-CM

## 2023-09-02 DIAGNOSIS — Z34.91 ENCOUNTER FOR SUPERVISION OF NORMAL PREGNANCY, UNSPECIFIED, FIRST TRIMESTER: ICD-10-CM

## 2023-09-06 ENCOUNTER — NON-APPOINTMENT (OUTPATIENT)
Age: 33
End: 2023-09-06

## 2023-09-07 ENCOUNTER — APPOINTMENT (OUTPATIENT)
Dept: OBGYN | Facility: CLINIC | Age: 33
End: 2023-09-07

## 2023-09-07 ENCOUNTER — NON-APPOINTMENT (OUTPATIENT)
Age: 33
End: 2023-09-07

## 2023-09-07 LAB
C TRACH RRNA SPEC QL NAA+PROBE: NOT DETECTED
CYTOLOGY CVX/VAG DOC THIN PREP: NORMAL
N GONORRHOEA RRNA SPEC QL NAA+PROBE: NOT DETECTED
SOURCE AMPLIFICATION: NORMAL
SOURCE AMPLIFICATION: NORMAL
T VAGINALIS RRNA SPEC QL NAA+PROBE: NOT DETECTED

## 2023-09-12 ENCOUNTER — NON-APPOINTMENT (OUTPATIENT)
Age: 33
End: 2023-09-12

## 2023-09-14 ENCOUNTER — NON-APPOINTMENT (OUTPATIENT)
Age: 33
End: 2023-09-14

## 2023-09-14 ENCOUNTER — APPOINTMENT (OUTPATIENT)
Dept: OBGYN | Facility: CLINIC | Age: 33
End: 2023-09-14

## 2023-09-19 ENCOUNTER — EMERGENCY (EMERGENCY)
Facility: HOSPITAL | Age: 33
LOS: 0 days | Discharge: HOME | End: 2023-09-20
Attending: EMERGENCY MEDICINE
Payer: MEDICAID

## 2023-09-19 ENCOUNTER — APPOINTMENT (OUTPATIENT)
Dept: ANTEPARTUM | Facility: CLINIC | Age: 33
End: 2023-09-19
Payer: MEDICAID

## 2023-09-19 ENCOUNTER — ASOB RESULT (OUTPATIENT)
Age: 33
End: 2023-09-19

## 2023-09-19 ENCOUNTER — OUTPATIENT (OUTPATIENT)
Dept: OUTPATIENT SERVICES | Facility: HOSPITAL | Age: 33
LOS: 1 days | End: 2023-09-19
Payer: MEDICAID

## 2023-09-19 VITALS
DIASTOLIC BLOOD PRESSURE: 73 MMHG | HEART RATE: 70 BPM | OXYGEN SATURATION: 99 % | RESPIRATION RATE: 18 BRPM | SYSTOLIC BLOOD PRESSURE: 126 MMHG | TEMPERATURE: 98 F | HEIGHT: 65 IN

## 2023-09-19 DIAGNOSIS — Z3A.11 11 WEEKS GESTATION OF PREGNANCY: ICD-10-CM

## 2023-09-19 DIAGNOSIS — Z34.91 ENCOUNTER FOR SUPERVISION OF NORMAL PREGNANCY, UNSPECIFIED, FIRST TRIMESTER: ICD-10-CM

## 2023-09-19 DIAGNOSIS — Z98.890 OTHER SPECIFIED POSTPROCEDURAL STATES: Chronic | ICD-10-CM

## 2023-09-19 DIAGNOSIS — Z00.00 ENCOUNTER FOR GENERAL ADULT MEDICAL EXAMINATION WITHOUT ABNORMAL FINDINGS: ICD-10-CM

## 2023-09-19 DIAGNOSIS — O20.9 HEMORRHAGE IN EARLY PREGNANCY, UNSPECIFIED: ICD-10-CM

## 2023-09-19 DIAGNOSIS — O09.90 SUPERVISION OF HIGH RISK PREGNANCY, UNSPECIFIED, UNSPECIFIED TRIMESTER: ICD-10-CM

## 2023-09-19 DIAGNOSIS — R10.30 LOWER ABDOMINAL PAIN, UNSPECIFIED: ICD-10-CM

## 2023-09-19 DIAGNOSIS — O26.891 OTHER SPECIFIED PREGNANCY RELATED CONDITIONS, FIRST TRIMESTER: ICD-10-CM

## 2023-09-19 DIAGNOSIS — O36.80X0 PREGNANCY WITH INCONCLUSIVE FETAL VIABILITY, NOT APPLICABLE OR UNSPECIFIED: ICD-10-CM

## 2023-09-19 LAB
ABO + RH PNL BLD: NORMAL
BASOPHILS # BLD AUTO: 0.02 K/UL — SIGNIFICANT CHANGE UP (ref 0–0.2)
BASOPHILS NFR BLD AUTO: 0.3 % — SIGNIFICANT CHANGE UP (ref 0–1)
BLD GP AB SCN SERPL QL: NORMAL
EOSINOPHIL # BLD AUTO: 0.15 K/UL — SIGNIFICANT CHANGE UP (ref 0–0.7)
EOSINOPHIL NFR BLD AUTO: 2.2 % — SIGNIFICANT CHANGE UP (ref 0–8)
ESTIMATED AVERAGE GLUCOSE: 123 MG/DL
HBA1C MFR BLD HPLC: 5.9 %
HCT VFR BLD CALC: 30.1 % — LOW (ref 37–47)
HGB BLD-MCNC: 9.9 G/DL — LOW (ref 12–16)
IMM GRANULOCYTES NFR BLD AUTO: 0.3 % — SIGNIFICANT CHANGE UP (ref 0.1–0.3)
LYMPHOCYTES # BLD AUTO: 2.72 K/UL — SIGNIFICANT CHANGE UP (ref 1.2–3.4)
LYMPHOCYTES # BLD AUTO: 39.8 % — SIGNIFICANT CHANGE UP (ref 20.5–51.1)
MCHC RBC-ENTMCNC: 27.5 PG — SIGNIFICANT CHANGE UP (ref 27–31)
MCHC RBC-ENTMCNC: 32.9 G/DL — SIGNIFICANT CHANGE UP (ref 32–37)
MCV RBC AUTO: 83.6 FL — SIGNIFICANT CHANGE UP (ref 81–99)
MONOCYTES # BLD AUTO: 0.48 K/UL — SIGNIFICANT CHANGE UP (ref 0.1–0.6)
MONOCYTES NFR BLD AUTO: 7 % — SIGNIFICANT CHANGE UP (ref 1.7–9.3)
NEUTROPHILS # BLD AUTO: 3.44 K/UL — SIGNIFICANT CHANGE UP (ref 1.4–6.5)
NEUTROPHILS NFR BLD AUTO: 50.4 % — SIGNIFICANT CHANGE UP (ref 42.2–75.2)
NRBC # BLD: 0 /100 WBCS — SIGNIFICANT CHANGE UP (ref 0–0)
PLATELET # BLD AUTO: 236 K/UL — SIGNIFICANT CHANGE UP (ref 130–400)
PMV BLD: 11.3 FL — HIGH (ref 7.4–10.4)
RBC # BLD: 3.6 M/UL — LOW (ref 4.2–5.4)
RBC # FLD: 16.4 % — HIGH (ref 11.5–14.5)
WBC # BLD: 6.83 K/UL — SIGNIFICANT CHANGE UP (ref 4.8–10.8)
WBC # FLD AUTO: 6.83 K/UL — SIGNIFICANT CHANGE UP (ref 4.8–10.8)

## 2023-09-19 PROCEDURE — 81220 CFTR GENE COM VARIANTS: CPT

## 2023-09-19 PROCEDURE — 36415 COLL VENOUS BLD VENIPUNCTURE: CPT

## 2023-09-19 PROCEDURE — 86803 HEPATITIS C AB TEST: CPT

## 2023-09-19 PROCEDURE — 76815 OB US LIMITED FETUS(S): CPT

## 2023-09-19 PROCEDURE — 83655 ASSAY OF LEAD: CPT

## 2023-09-19 PROCEDURE — 81001 URINALYSIS AUTO W/SCOPE: CPT

## 2023-09-19 PROCEDURE — 87086 URINE CULTURE/COLONY COUNT: CPT

## 2023-09-19 PROCEDURE — 86850 RBC ANTIBODY SCREEN: CPT

## 2023-09-19 PROCEDURE — 76815 OB US LIMITED FETUS(S): CPT | Mod: 26

## 2023-09-19 PROCEDURE — 81204 AR GENE CHARAC ALLELES: CPT

## 2023-09-19 PROCEDURE — 84702 CHORIONIC GONADOTROPIN TEST: CPT

## 2023-09-19 PROCEDURE — 86900 BLOOD TYPING SEROLOGIC ABO: CPT

## 2023-09-19 PROCEDURE — 83020 HEMOGLOBIN ELECTROPHORESIS: CPT

## 2023-09-19 PROCEDURE — 86901 BLOOD TYPING SEROLOGIC RH(D): CPT

## 2023-09-19 PROCEDURE — 81243 FMR1 GEN ALY DETC ABNL ALLEL: CPT

## 2023-09-19 PROCEDURE — 85025 COMPLETE CBC W/AUTO DIFF WBC: CPT

## 2023-09-19 PROCEDURE — 80053 COMPREHEN METABOLIC PANEL: CPT

## 2023-09-19 PROCEDURE — 83036 HEMOGLOBIN GLYCOSYLATED A1C: CPT

## 2023-09-19 PROCEDURE — 86780 TREPONEMA PALLIDUM: CPT

## 2023-09-19 PROCEDURE — 99284 EMERGENCY DEPT VISIT MOD MDM: CPT | Mod: 25

## 2023-09-19 PROCEDURE — G0452: CPT | Mod: 26

## 2023-09-19 PROCEDURE — 83020 HEMOGLOBIN ELECTROPHORESIS: CPT | Mod: 26

## 2023-09-19 PROCEDURE — 99284 EMERGENCY DEPT VISIT MOD MDM: CPT

## 2023-09-20 VITALS
TEMPERATURE: 98 F | RESPIRATION RATE: 18 BRPM | DIASTOLIC BLOOD PRESSURE: 66 MMHG | OXYGEN SATURATION: 98 % | HEART RATE: 66 BPM | SYSTOLIC BLOOD PRESSURE: 105 MMHG

## 2023-09-20 DIAGNOSIS — Z34.91 ENCOUNTER FOR SUPERVISION OF NORMAL PREGNANCY, UNSPECIFIED, FIRST TRIMESTER: ICD-10-CM

## 2023-09-20 LAB
ALBUMIN SERPL ELPH-MCNC: 4.2 G/DL — SIGNIFICANT CHANGE UP (ref 3.5–5.2)
ALP SERPL-CCNC: 63 U/L — SIGNIFICANT CHANGE UP (ref 30–115)
ALT FLD-CCNC: 11 U/L — SIGNIFICANT CHANGE UP (ref 0–41)
ANION GAP SERPL CALC-SCNC: 13 MMOL/L — SIGNIFICANT CHANGE UP (ref 7–14)
APPEARANCE UR: CLEAR — SIGNIFICANT CHANGE UP
AST SERPL-CCNC: 13 U/L — SIGNIFICANT CHANGE UP (ref 0–41)
BACTERIA # UR AUTO: ABNORMAL /HPF
BILIRUB SERPL-MCNC: <0.2 MG/DL — SIGNIFICANT CHANGE UP (ref 0.2–1.2)
BILIRUB UR-MCNC: ABNORMAL
BLD GP AB SCN SERPL QL: SIGNIFICANT CHANGE UP
BUN SERPL-MCNC: 7 MG/DL — LOW (ref 10–20)
CALCIUM SERPL-MCNC: 8.5 MG/DL — SIGNIFICANT CHANGE UP (ref 8.4–10.5)
CAST: 0 /LPF — SIGNIFICANT CHANGE UP (ref 0–4)
CHLORIDE SERPL-SCNC: 106 MMOL/L — SIGNIFICANT CHANGE UP (ref 98–110)
CO2 SERPL-SCNC: 18 MMOL/L — SIGNIFICANT CHANGE UP (ref 17–32)
COLOR SPEC: YELLOW — SIGNIFICANT CHANGE UP
CREAT SERPL-MCNC: 0.5 MG/DL — LOW (ref 0.7–1.5)
DIFF PNL FLD: ABNORMAL
EGFR: 128 ML/MIN/1.73M2 — SIGNIFICANT CHANGE UP
GLUCOSE SERPL-MCNC: 90 MG/DL — SIGNIFICANT CHANGE UP (ref 70–99)
GLUCOSE UR QL: NEGATIVE MG/DL — SIGNIFICANT CHANGE UP
HCG SERPL-ACNC: HIGH MIU/ML
KETONES UR-MCNC: NEGATIVE MG/DL — SIGNIFICANT CHANGE UP
LEUKOCYTE ESTERASE UR-ACNC: ABNORMAL
NITRITE UR-MCNC: NEGATIVE — SIGNIFICANT CHANGE UP
PH UR: 6.5 — SIGNIFICANT CHANGE UP (ref 5–8)
POTASSIUM SERPL-MCNC: 3.7 MMOL/L — SIGNIFICANT CHANGE UP (ref 3.5–5)
POTASSIUM SERPL-SCNC: 3.7 MMOL/L — SIGNIFICANT CHANGE UP (ref 3.5–5)
PROT SERPL-MCNC: 6.4 G/DL — SIGNIFICANT CHANGE UP (ref 6–8)
PROT UR-MCNC: NEGATIVE MG/DL — SIGNIFICANT CHANGE UP
RBC CASTS # UR COMP ASSIST: 2 /HPF — SIGNIFICANT CHANGE UP (ref 0–4)
SODIUM SERPL-SCNC: 137 MMOL/L — SIGNIFICANT CHANGE UP (ref 135–146)
SP GR SPEC: 1.02 — SIGNIFICANT CHANGE UP (ref 1–1.03)
SQUAMOUS # UR AUTO: 3 /HPF — SIGNIFICANT CHANGE UP (ref 0–5)
UROBILINOGEN FLD QL: 0.2 MG/DL — SIGNIFICANT CHANGE UP (ref 0.2–1)
WBC UR QL: 1 /HPF — SIGNIFICANT CHANGE UP (ref 0–5)

## 2023-09-20 PROCEDURE — 76815 OB US LIMITED FETUS(S): CPT | Mod: 26

## 2023-09-20 RX ORDER — ACETAMINOPHEN 500 MG
650 TABLET ORAL ONCE
Refills: 0 | Status: COMPLETED | OUTPATIENT
Start: 2023-09-20 | End: 2023-09-20

## 2023-09-20 RX ADMIN — Medication 650 MILLIGRAM(S): at 01:01

## 2023-09-20 NOTE — ED PROVIDER NOTE - CLINICAL SUMMARY MEDICAL DECISION MAKING FREE TEXT BOX
33-year-old female presenting for evaluation of vaginal bleeding/spotting for 2 days.  Has had confirmed IUP, currently approximately 11 weeks pregnant.  Associated lower abdominal cramping.  No other acute complaints.  No fevers chills nausea vomiting, urinary symptoms.  Well appearing, NAD, non toxic. NCAT PERRLA EOMI neck supple non tender normal wob abdomen s nt nd no rebound no guarding WWPx4 neuro non focal.  Labs/imaging reviewed.  Aware of all results, given a copy of all available results, comfortable with discharge and follow-up outpatient, strict return precautions given. Endorses understanding and aware they can return at any time for further evaluation. No further questions or concerns at this time.

## 2023-09-20 NOTE — ED PROVIDER NOTE - PATIENT PORTAL LINK FT
You can access the FollowMyHealth Patient Portal offered by [facility] by registering at the following website: [email or web address]. By joining [employer]’s FollowMyHealth portal, you will also be able to view your health information using other applications (apps) compatible with our system.

## 2023-09-20 NOTE — ED PROVIDER NOTE - OBJECTIVE STATEMENT
83-year-old  female 1 prior miscarriage presenting to ER with vaginal bleeding.  Patient is currently 11 weeks pregnant follows at OB/GYN clinic.  States over the past 2 days has been having light vaginal spotting but noted to be heavier today.  States has not had to use any pads.  Mild lower abdominal cramping x1 day.  Denies any associated back pain, lightheadedness, dizziness, syncope, heavy vaginal bleeding, urinary symptoms, vaginal discharge, chest pain or shortness of breath. 33-year-old  female 1 prior miscarriage presenting to ER with vaginal bleeding.  Patient is currently 11 weeks pregnant follows at OB/GYN clinic.  States over the past 2 days has been having light vaginal spotting but noted to be heavier today.  States has not had to use any pads.  Mild lower abdominal cramping x1 day.  Denies any associated back pain, lightheadedness, dizziness, syncope, heavy vaginal bleeding, urinary symptoms, vaginal discharge, chest pain or shortness of breath.

## 2023-09-20 NOTE — ED PROVIDER NOTE - PHYSICAL EXAMINATION
CONSTITUTIONAL: Well-appearing; well-nourished; in no apparent distress.   NECK: Supple; non-tender; no cervical lymphadenopathy.   CARDIOVASCULAR: Normal S1, S2; no murmurs, rubs, or gallops.   RESPIRATORY: Normal chest excursion with respiration; breath sounds clear and equal bilaterally; no wheezes, rhonchi, or rales.  GI/: Normal bowel sounds; non-distended; non-tender; no palpable organomegaly.   Pelvic exam: chaperoned by rn: anna no external nl. cervical os closed. No blood or discharge noted.   MS: No evidence of trauma or deformity.  Normal ROM in all four extremities; non-tender to palpation; distal pulses are normal.   SKIN: Normal for age and race; warm; dry; good turgor; no apparent lesions or exudate.   NEURO/PSYCH: A & O x 4; grossly unremarkable. mood and manner are appropriate.

## 2023-09-21 LAB
BACTERIA UR CULT: NORMAL
CULTURE RESULTS: SIGNIFICANT CHANGE UP
HCV AB SER QL: NONREACTIVE
HCV S/CO RATIO: 0.06 S/CO
HGB A MFR BLD: 97.5 %
HGB A2 MFR BLD: 2.5 %
HGB FRACT BLD-IMP: NORMAL
LEAD BLD-MCNC: <1 UG/DL
SPECIMEN SOURCE: SIGNIFICANT CHANGE UP
T PALLIDUM AB SER QL IA: NEGATIVE

## 2023-09-27 ENCOUNTER — NON-APPOINTMENT (OUTPATIENT)
Age: 33
End: 2023-09-27

## 2023-09-28 ENCOUNTER — OUTPATIENT (OUTPATIENT)
Dept: OUTPATIENT SERVICES | Facility: HOSPITAL | Age: 33
LOS: 1 days | End: 2023-09-28
Payer: MEDICAID

## 2023-09-28 ENCOUNTER — NON-APPOINTMENT (OUTPATIENT)
Age: 33
End: 2023-09-28

## 2023-09-28 ENCOUNTER — APPOINTMENT (OUTPATIENT)
Dept: OBGYN | Facility: CLINIC | Age: 33
End: 2023-09-28
Payer: MEDICAID

## 2023-09-28 VITALS
BODY MASS INDEX: 28.34 KG/M2 | HEIGHT: 62 IN | DIASTOLIC BLOOD PRESSURE: 71 MMHG | SYSTOLIC BLOOD PRESSURE: 103 MMHG | WEIGHT: 154 LBS

## 2023-09-28 DIAGNOSIS — Z98.890 OTHER SPECIFIED POSTPROCEDURAL STATES: Chronic | ICD-10-CM

## 2023-09-28 DIAGNOSIS — Z34.90 ENCOUNTER FOR SUPERVISION OF NORMAL PREGNANCY, UNSPECIFIED, UNSPECIFIED TRIMESTER: ICD-10-CM

## 2023-09-28 PROCEDURE — 99213 OFFICE O/P EST LOW 20 MIN: CPT

## 2023-09-28 PROCEDURE — 81002 URINALYSIS NONAUTO W/O SCOPE: CPT

## 2023-09-29 DIAGNOSIS — Z34.92 ENCOUNTER FOR SUPERVISION OF NORMAL PREGNANCY, UNSPECIFIED, SECOND TRIMESTER: ICD-10-CM

## 2023-10-01 LAB
BILIRUB UR QL STRIP: NORMAL
CLARITY UR: CLEAR
COLLECTION METHOD: NORMAL
GLUCOSE UR-MCNC: NORMAL
HCG UR QL: NORMAL EU/DL
HGB UR QL STRIP.AUTO: NORMAL
KETONES UR-MCNC: NORMAL
LEUKOCYTE ESTERASE UR QL STRIP: NORMAL
NITRITE UR QL STRIP: NORMAL
PH UR STRIP: NORMAL
PROT UR STRIP-MCNC: NORMAL
SP GR UR STRIP: NORMAL

## 2023-10-03 ENCOUNTER — OUTPATIENT (OUTPATIENT)
Dept: OUTPATIENT SERVICES | Facility: HOSPITAL | Age: 33
LOS: 1 days | End: 2023-10-03
Payer: MEDICAID

## 2023-10-03 ENCOUNTER — APPOINTMENT (OUTPATIENT)
Dept: ANTEPARTUM | Facility: CLINIC | Age: 33
End: 2023-10-03
Payer: MEDICAID

## 2023-10-03 ENCOUNTER — ASOB RESULT (OUTPATIENT)
Age: 33
End: 2023-10-03

## 2023-10-03 DIAGNOSIS — Z34.90 ENCOUNTER FOR SUPERVISION OF NORMAL PREGNANCY, UNSPECIFIED, UNSPECIFIED TRIMESTER: ICD-10-CM

## 2023-10-03 DIAGNOSIS — O09.899 SUPERVISION OF OTHER HIGH RISK PREGNANCIES, UNSPECIFIED TRIMESTER: ICD-10-CM

## 2023-10-03 DIAGNOSIS — Z14.1 SUPERVISION OF OTHER HIGH RISK PREGNANCIES, UNSPECIFIED TRIMESTER: ICD-10-CM

## 2023-10-03 LAB
AR GENE MUT ANL BLD/T: NORMAL
CFTR MUT TESTED BLD/T: POSITIVE
FMR1 GENE MUT ANL BLD/T: NORMAL

## 2023-10-03 PROCEDURE — 76813 OB US NUCHAL MEAS 1 GEST: CPT | Mod: 26

## 2023-10-03 PROCEDURE — 76813 OB US NUCHAL MEAS 1 GEST: CPT

## 2023-10-04 ENCOUNTER — OUTPATIENT (OUTPATIENT)
Dept: OUTPATIENT SERVICES | Facility: HOSPITAL | Age: 33
LOS: 1 days | End: 2023-10-04

## 2023-10-04 ENCOUNTER — OUTPATIENT (OUTPATIENT)
Dept: OUTPATIENT SERVICES | Facility: HOSPITAL | Age: 33
LOS: 1 days | End: 2023-10-04
Payer: MEDICAID

## 2023-10-04 DIAGNOSIS — Z3A.13 13 WEEKS GESTATION OF PREGNANCY: ICD-10-CM

## 2023-10-04 DIAGNOSIS — Z98.890 OTHER SPECIFIED POSTPROCEDURAL STATES: Chronic | ICD-10-CM

## 2023-10-04 DIAGNOSIS — Z36.82 ENCOUNTER FOR ANTENATAL SCREENING FOR NUCHAL TRANSLUCENCY: ICD-10-CM

## 2023-10-04 LAB — GLUCOSE 1H P 50 G GLC PO SERPL-MCNC: 136 MG/DL

## 2023-10-04 PROCEDURE — 81420 FETAL CHRMOML ANEUPLOIDY: CPT

## 2023-10-04 PROCEDURE — 82950 GLUCOSE TEST: CPT

## 2023-10-04 PROCEDURE — 36415 COLL VENOUS BLD VENIPUNCTURE: CPT

## 2023-10-04 PROCEDURE — 81422 FETAL CHRMOML MICRODELTJ: CPT

## 2023-10-06 ENCOUNTER — NON-APPOINTMENT (OUTPATIENT)
Age: 33
End: 2023-10-06

## 2023-10-10 ENCOUNTER — OUTPATIENT (OUTPATIENT)
Dept: OUTPATIENT SERVICES | Facility: HOSPITAL | Age: 33
LOS: 1 days | End: 2023-10-10
Payer: MEDICAID

## 2023-10-10 ENCOUNTER — APPOINTMENT (OUTPATIENT)
Dept: ANTEPARTUM | Facility: CLINIC | Age: 33
End: 2023-10-10
Payer: MEDICAID

## 2023-10-10 DIAGNOSIS — Z98.890 OTHER SPECIFIED POSTPROCEDURAL STATES: Chronic | ICD-10-CM

## 2023-10-10 DIAGNOSIS — Z31.5 ENCOUNTER FOR PROCREATIVE GENETIC COUNSELING: ICD-10-CM

## 2023-10-10 DIAGNOSIS — Z34.90 ENCOUNTER FOR SUPERVISION OF NORMAL PREGNANCY, UNSPECIFIED, UNSPECIFIED TRIMESTER: ICD-10-CM

## 2023-10-10 PROCEDURE — 99212 OFFICE O/P EST SF 10 MIN: CPT

## 2023-10-10 PROCEDURE — ZZZZZ: CPT

## 2023-10-11 DIAGNOSIS — Z34.90 ENCOUNTER FOR SUPERVISION OF NORMAL PREGNANCY, UNSPECIFIED, UNSPECIFIED TRIMESTER: ICD-10-CM

## 2023-10-12 ENCOUNTER — OUTPATIENT (OUTPATIENT)
Dept: OUTPATIENT SERVICES | Facility: HOSPITAL | Age: 33
LOS: 1 days | End: 2023-10-12
Payer: MEDICAID

## 2023-10-12 DIAGNOSIS — Z98.890 OTHER SPECIFIED POSTPROCEDURAL STATES: Chronic | ICD-10-CM

## 2023-10-12 DIAGNOSIS — Z34.90 ENCOUNTER FOR SUPERVISION OF NORMAL PREGNANCY, UNSPECIFIED, UNSPECIFIED TRIMESTER: ICD-10-CM

## 2023-10-12 PROCEDURE — 36415 COLL VENOUS BLD VENIPUNCTURE: CPT

## 2023-10-13 DIAGNOSIS — Z34.90 ENCOUNTER FOR SUPERVISION OF NORMAL PREGNANCY, UNSPECIFIED, UNSPECIFIED TRIMESTER: ICD-10-CM

## 2023-10-13 DIAGNOSIS — Z31.5 ENCOUNTER FOR PROCREATIVE GENETIC COUNSELING: ICD-10-CM

## 2023-10-17 DIAGNOSIS — Z34.90 ENCOUNTER FOR SUPERVISION OF NORMAL PREGNANCY, UNSPECIFIED, UNSPECIFIED TRIMESTER: ICD-10-CM

## 2023-10-18 DIAGNOSIS — Z34.90 ENCOUNTER FOR SUPERVISION OF NORMAL PREGNANCY, UNSPECIFIED, UNSPECIFIED TRIMESTER: ICD-10-CM

## 2023-10-18 LAB
CHROMOSOME13 INTERPRETATION: NORMAL
CHROMOSOME13 TEST RESULT: NORMAL
CHROMOSOME18 INTERPRETATION: NORMAL
CHROMOSOME18 TEST RESULT: NORMAL
CHROMOSOME21 INTERPRETATION: NORMAL
CHROMOSOME21 TEST RESULT: NORMAL
FETAL FRACTION: NORMAL
MICRODELETIONS INTERPRETATION: NORMAL
MICRODELETIONS RESULT: NORMAL
PERFORMANCE AND LIMITATIONS: NORMAL
SEX CHROMOSOME INTERPRETATION: NORMAL
SEX CHROMOSOME TEST RESULT: NORMAL
VERIFI WITH MICRODELETIONS: NOT DETECTED

## 2023-10-26 ENCOUNTER — APPOINTMENT (OUTPATIENT)
Dept: OBGYN | Facility: CLINIC | Age: 33
End: 2023-10-26
Payer: MEDICAID

## 2023-10-26 ENCOUNTER — OUTPATIENT (OUTPATIENT)
Dept: OUTPATIENT SERVICES | Facility: HOSPITAL | Age: 33
LOS: 1 days | End: 2023-10-26
Payer: MEDICAID

## 2023-10-26 VITALS
WEIGHT: 156 LBS | DIASTOLIC BLOOD PRESSURE: 62 MMHG | BODY MASS INDEX: 28.71 KG/M2 | SYSTOLIC BLOOD PRESSURE: 116 MMHG | HEIGHT: 62 IN

## 2023-10-26 DIAGNOSIS — Z98.890 OTHER SPECIFIED POSTPROCEDURAL STATES: Chronic | ICD-10-CM

## 2023-10-26 DIAGNOSIS — Z34.90 ENCOUNTER FOR SUPERVISION OF NORMAL PREGNANCY, UNSPECIFIED, UNSPECIFIED TRIMESTER: ICD-10-CM

## 2023-10-26 DIAGNOSIS — R09.81 NASAL CONGESTION: ICD-10-CM

## 2023-10-26 PROCEDURE — 81002 URINALYSIS NONAUTO W/O SCOPE: CPT

## 2023-10-26 PROCEDURE — 99213 OFFICE O/P EST LOW 20 MIN: CPT

## 2023-10-26 RX ORDER — FLUTICASONE PROPIONATE 50 UG/1
50 SPRAY, METERED NASAL TWICE DAILY
Qty: 1 | Refills: 2 | Status: ACTIVE | COMMUNITY
Start: 2023-10-26 | End: 1900-01-01

## 2023-10-26 RX ORDER — FAMOTIDINE 20 MG/1
20 TABLET, FILM COATED ORAL
Qty: 30 | Refills: 1 | Status: ACTIVE | COMMUNITY
Start: 2023-10-26 | End: 1900-01-01

## 2023-10-26 RX ORDER — LIDOCAINE 40 MG/G
4 PATCH TOPICAL
Qty: 5 | Refills: 0 | Status: ACTIVE | COMMUNITY
Start: 2023-10-26 | End: 1900-01-01

## 2023-10-30 DIAGNOSIS — Z34.90 ENCOUNTER FOR SUPERVISION OF NORMAL PREGNANCY, UNSPECIFIED, UNSPECIFIED TRIMESTER: ICD-10-CM

## 2023-10-30 DIAGNOSIS — R09.81 NASAL CONGESTION: ICD-10-CM

## 2023-10-31 LAB
BILIRUB UR QL STRIP: NORMAL
CLARITY UR: CLEAR
COLLECTION METHOD: NORMAL
GLUCOSE UR-MCNC: NORMAL
HCG UR QL: 0.2 EU/DL
HGB UR QL STRIP.AUTO: NORMAL
KETONES UR-MCNC: NORMAL
LEUKOCYTE ESTERASE UR QL STRIP: NORMAL
NITRITE UR QL STRIP: NORMAL
PH UR STRIP: 6.5
PROT UR STRIP-MCNC: NORMAL
SP GR UR STRIP: 1.02

## 2023-11-21 ENCOUNTER — APPOINTMENT (OUTPATIENT)
Dept: ANTEPARTUM | Facility: CLINIC | Age: 33
End: 2023-11-21
Payer: MEDICAID

## 2023-11-21 ENCOUNTER — ASOB RESULT (OUTPATIENT)
Age: 33
End: 2023-11-21

## 2023-11-21 ENCOUNTER — OUTPATIENT (OUTPATIENT)
Dept: OUTPATIENT SERVICES | Facility: HOSPITAL | Age: 33
LOS: 1 days | End: 2023-11-21
Payer: MEDICAID

## 2023-11-21 DIAGNOSIS — K02.9 DENTAL CARIES, UNSPECIFIED: ICD-10-CM

## 2023-11-21 DIAGNOSIS — Z34.90 ENCOUNTER FOR SUPERVISION OF NORMAL PREGNANCY, UNSPECIFIED, UNSPECIFIED TRIMESTER: ICD-10-CM

## 2023-11-21 DIAGNOSIS — Z98.890 OTHER SPECIFIED POSTPROCEDURAL STATES: Chronic | ICD-10-CM

## 2023-11-21 PROCEDURE — 76805 OB US >/= 14 WKS SNGL FETUS: CPT | Mod: 26

## 2023-11-21 PROCEDURE — 76805 OB US >/= 14 WKS SNGL FETUS: CPT

## 2023-11-21 PROCEDURE — 76817 TRANSVAGINAL US OBSTETRIC: CPT | Mod: 26

## 2023-11-21 PROCEDURE — 76817 TRANSVAGINAL US OBSTETRIC: CPT

## 2023-11-21 PROCEDURE — D0140: CPT

## 2023-11-22 ENCOUNTER — OUTPATIENT (OUTPATIENT)
Dept: OUTPATIENT SERVICES | Facility: HOSPITAL | Age: 33
LOS: 1 days | End: 2023-11-22
Payer: MEDICAID

## 2023-11-22 DIAGNOSIS — Z98.890 OTHER SPECIFIED POSTPROCEDURAL STATES: Chronic | ICD-10-CM

## 2023-11-22 DIAGNOSIS — Z36.3 ENCOUNTER FOR ANTENATAL SCREENING FOR MALFORMATIONS: ICD-10-CM

## 2023-11-22 DIAGNOSIS — Z3A.20 20 WEEKS GESTATION OF PREGNANCY: ICD-10-CM

## 2023-11-22 PROCEDURE — 82105 ALPHA-FETOPROTEIN SERUM: CPT

## 2023-11-22 PROCEDURE — 36415 COLL VENOUS BLD VENIPUNCTURE: CPT

## 2023-11-30 ENCOUNTER — APPOINTMENT (OUTPATIENT)
Dept: OBGYN | Facility: CLINIC | Age: 33
End: 2023-11-30
Payer: MEDICAID

## 2023-11-30 ENCOUNTER — NON-APPOINTMENT (OUTPATIENT)
Age: 33
End: 2023-11-30

## 2023-11-30 ENCOUNTER — OUTPATIENT (OUTPATIENT)
Dept: OUTPATIENT SERVICES | Facility: HOSPITAL | Age: 33
LOS: 1 days | End: 2023-11-30
Payer: MEDICAID

## 2023-11-30 VITALS
HEIGHT: 62 IN | SYSTOLIC BLOOD PRESSURE: 103 MMHG | DIASTOLIC BLOOD PRESSURE: 68 MMHG | HEART RATE: 84 BPM | WEIGHT: 162.9 LBS | BODY MASS INDEX: 29.98 KG/M2 | OXYGEN SATURATION: 100 % | TEMPERATURE: 98 F | RESPIRATION RATE: 18 BRPM

## 2023-11-30 DIAGNOSIS — Z34.90 ENCOUNTER FOR SUPERVISION OF NORMAL PREGNANCY, UNSPECIFIED, UNSPECIFIED TRIMESTER: ICD-10-CM

## 2023-11-30 DIAGNOSIS — Z98.890 OTHER SPECIFIED POSTPROCEDURAL STATES: Chronic | ICD-10-CM

## 2023-11-30 LAB
AFP MOM: 1.25
AFP VALUE: 47.1 NG/ML
ALPHA FETOPROTEIN SERUM COMMENT: NORMAL
ALPHA FETOPROTEIN SERUM INTERPRETATION: NORMAL
ALPHA FETOPROTEIN SERUM RESULTS: NORMAL
ALPHA FETOPROTEIN SERUM TEST RESULTS: NORMAL
BILIRUB UR QL STRIP: NORMAL
CLARITY UR: CLEAR
COLLECTION METHOD: NORMAL
GESTATIONAL AGE BASED ON: NORMAL
GESTATIONAL AGE ON COLLECTION DATE: 17 WEEKS
GLUCOSE UR-MCNC: NORMAL
HCG UR QL: 0.2 EU/DL
HGB UR QL STRIP.AUTO: NORMAL
INSULIN DEP DIABETES: NO
KETONES UR-MCNC: NORMAL
LEUKOCYTE ESTERASE UR QL STRIP: NORMAL
MATERNAL AGE AT EDD AFP: 33 YR
MULTIPLE GESTATION: NO
NITRITE UR QL STRIP: NORMAL
OSBR RISK 1 IN: 5576
PROT UR STRIP-MCNC: NORMAL
RACE: NORMAL
WEIGHT AFP: 156 LBS

## 2023-11-30 PROCEDURE — 81002 URINALYSIS NONAUTO W/O SCOPE: CPT

## 2023-11-30 PROCEDURE — 99213 OFFICE O/P EST LOW 20 MIN: CPT

## 2023-11-30 PROCEDURE — 90471 IMMUNIZATION ADMIN: CPT

## 2023-12-01 DIAGNOSIS — Z34.90 ENCOUNTER FOR SUPERVISION OF NORMAL PREGNANCY, UNSPECIFIED, UNSPECIFIED TRIMESTER: ICD-10-CM

## 2023-12-05 ENCOUNTER — OUTPATIENT (OUTPATIENT)
Dept: OUTPATIENT SERVICES | Facility: HOSPITAL | Age: 33
LOS: 1 days | End: 2023-12-05
Payer: MEDICAID

## 2023-12-05 ENCOUNTER — ASOB RESULT (OUTPATIENT)
Age: 33
End: 2023-12-05

## 2023-12-05 ENCOUNTER — APPOINTMENT (OUTPATIENT)
Dept: ANTEPARTUM | Facility: CLINIC | Age: 33
End: 2023-12-05
Payer: MEDICAID

## 2023-12-05 DIAGNOSIS — O28.3 ABNORMAL ULTRASONIC FINDING ON ANTENATAL SCREENING OF MOTHER: ICD-10-CM

## 2023-12-05 DIAGNOSIS — Z98.890 OTHER SPECIFIED POSTPROCEDURAL STATES: Chronic | ICD-10-CM

## 2023-12-05 DIAGNOSIS — Z34.90 ENCOUNTER FOR SUPERVISION OF NORMAL PREGNANCY, UNSPECIFIED, UNSPECIFIED TRIMESTER: ICD-10-CM

## 2023-12-05 PROCEDURE — 76816 OB US FOLLOW-UP PER FETUS: CPT

## 2023-12-05 PROCEDURE — 76816 OB US FOLLOW-UP PER FETUS: CPT | Mod: 26

## 2023-12-06 DIAGNOSIS — Z36.3 ENCOUNTER FOR ANTENATAL SCREENING FOR MALFORMATIONS: ICD-10-CM

## 2023-12-06 DIAGNOSIS — Z3A.22 22 WEEKS GESTATION OF PREGNANCY: ICD-10-CM

## 2023-12-28 ENCOUNTER — APPOINTMENT (OUTPATIENT)
Dept: OBGYN | Facility: CLINIC | Age: 33
End: 2023-12-28
Payer: MEDICAID

## 2023-12-28 ENCOUNTER — NON-APPOINTMENT (OUTPATIENT)
Age: 33
End: 2023-12-28

## 2023-12-28 ENCOUNTER — OUTPATIENT (OUTPATIENT)
Dept: OUTPATIENT SERVICES | Facility: HOSPITAL | Age: 33
LOS: 1 days | End: 2023-12-28
Payer: MEDICAID

## 2023-12-28 VITALS
BODY MASS INDEX: 30.36 KG/M2 | DIASTOLIC BLOOD PRESSURE: 60 MMHG | SYSTOLIC BLOOD PRESSURE: 100 MMHG | WEIGHT: 165 LBS | HEIGHT: 62 IN

## 2023-12-28 DIAGNOSIS — Z34.90 ENCOUNTER FOR SUPERVISION OF NORMAL PREGNANCY, UNSPECIFIED, UNSPECIFIED TRIMESTER: ICD-10-CM

## 2023-12-28 PROCEDURE — 99213 OFFICE O/P EST LOW 20 MIN: CPT

## 2023-12-29 DIAGNOSIS — Z34.90 ENCOUNTER FOR SUPERVISION OF NORMAL PREGNANCY, UNSPECIFIED, UNSPECIFIED TRIMESTER: ICD-10-CM

## 2024-01-02 NOTE — ED ADULT TRIAGE NOTE - BP NONINVASIVE SYSTOLIC (MM HG)
Lvm regarding to please call back , do not have a note in chart stating if its ok to leave detailed vm.   128

## 2024-01-03 ENCOUNTER — APPOINTMENT (OUTPATIENT)
Dept: PEDIATRIC CARDIOLOGY | Facility: CLINIC | Age: 34
End: 2024-01-03
Payer: MEDICAID

## 2024-01-03 PROCEDURE — 76825 ECHO EXAM OF FETAL HEART: CPT

## 2024-01-03 PROCEDURE — 76827 ECHO EXAM OF FETAL HEART: CPT

## 2024-01-03 PROCEDURE — 93325 DOPPLER ECHO COLOR FLOW MAPG: CPT

## 2024-01-03 PROCEDURE — 99205 OFFICE O/P NEW HI 60 MIN: CPT | Mod: 25

## 2024-01-03 NOTE — HISTORY OF PRESENT ILLNESS
[FreeTextEntry1] : Dear Dr. Ramires,  I had the pleasure of seeing your patient, AMIRA MCGUIRE, in my office today, 01/03/2024. She was referred to pediatric cardiology for fetal echocardiogram.  Echogenic focus AMIRA has been doing well from a clinical standpoint. There is no family history of congenital heart disease.  Today's echocardiogram was normal. Please see attached report.

## 2024-01-03 NOTE — DISCUSSION/SUMMARY
[FreeTextEntry1] : Normal fetal echocardiogram No echogenic focus today Reassurance; recommend routine prenatal care and delivery  Please do not hesitate to contact me if you have any questions.   Alonso Kevin MD, MS, FAAP, FACC Attending Physician, Pediatric Cardiology [facility] [place] [address] Office: [phone] Fax: [phone] Email: [email or web address]     I have spent 60 minutes of time on the encounter excluding separately reported services.

## 2024-01-04 LAB
BILIRUB UR QL STRIP: NEGATIVE
CLARITY UR: CLEAR
COLLECTION METHOD: NORMAL
GLUCOSE UR-MCNC: NEGATIVE
HCG UR QL: 0.2 EU/DL
HGB UR QL STRIP.AUTO: NEGATIVE
KETONES UR-MCNC: NEGATIVE
LEUKOCYTE ESTERASE UR QL STRIP: NEGATIVE
NITRITE UR QL STRIP: NEGATIVE
PH UR STRIP: 6.5
PROT UR STRIP-MCNC: NEGATIVE
SP GR UR STRIP: 1.02

## 2024-01-07 NOTE — DISCHARGE NOTE ADULT - HOSPITAL COURSE
cough 28 F S/P extraction of wisdom tooth on right side previous Wednesday presented on POD 4 with severe pain and post op edema adjacent to right mandible, admitted to medicine for suspected abscess of the neck/mandibular inflammation. Ct soft tissue Neck revealed some inflammation but no discrete fluid collection.  ENT and [facility] services were consulted, and report that the patient does not require surgical intervention as there is no sign of abscess, and that she can follow up with the dental clinic within the week of discharge. Her pain is currently improving, and she no longer has a WBC count or any sign of fever. She will be discharged home today with oral antibiotics and ibuprofen for pain control. She will follow up with dental clinic later today, and then with oral surgery on August 1st.

## 2024-01-12 ENCOUNTER — OUTPATIENT (OUTPATIENT)
Dept: OUTPATIENT SERVICES | Facility: HOSPITAL | Age: 34
LOS: 1 days | End: 2024-01-12
Payer: MEDICAID

## 2024-01-12 DIAGNOSIS — Z98.890 OTHER SPECIFIED POSTPROCEDURAL STATES: Chronic | ICD-10-CM

## 2024-01-12 DIAGNOSIS — Z34.90 ENCOUNTER FOR SUPERVISION OF NORMAL PREGNANCY, UNSPECIFIED, UNSPECIFIED TRIMESTER: ICD-10-CM

## 2024-01-12 PROCEDURE — 82950 GLUCOSE TEST: CPT

## 2024-01-12 PROCEDURE — 86780 TREPONEMA PALLIDUM: CPT

## 2024-01-12 PROCEDURE — 85027 COMPLETE CBC AUTOMATED: CPT

## 2024-01-12 PROCEDURE — 36415 COLL VENOUS BLD VENIPUNCTURE: CPT

## 2024-01-13 DIAGNOSIS — Z34.90 ENCOUNTER FOR SUPERVISION OF NORMAL PREGNANCY, UNSPECIFIED, UNSPECIFIED TRIMESTER: ICD-10-CM

## 2024-01-15 DIAGNOSIS — Z34.90 ENCOUNTER FOR SUPERVISION OF NORMAL PREGNANCY, UNSPECIFIED, UNSPECIFIED TRIMESTER: ICD-10-CM

## 2024-01-15 LAB
BASOPHILS # BLD AUTO: 0.01 K/UL
BASOPHILS NFR BLD AUTO: 0.1 %
EOSINOPHIL # BLD AUTO: 0.11 K/UL
EOSINOPHIL NFR BLD AUTO: 1.5 %
GLUCOSE 1H P 50 G GLC PO SERPL-MCNC: 152 MG/DL
HCT VFR BLD CALC: 32.1 %
HGB BLD-MCNC: 10.5 G/DL
IMM GRANULOCYTES NFR BLD AUTO: 0.9 %
LYMPHOCYTES # BLD AUTO: 1.7 K/UL
LYMPHOCYTES NFR BLD AUTO: 22.6 %
MAN DIFF?: NORMAL
MCHC RBC-ENTMCNC: 30.4 PG
MCHC RBC-ENTMCNC: 32.7 G/DL
MCV RBC AUTO: 93 FL
MONOCYTES # BLD AUTO: 0.51 K/UL
MONOCYTES NFR BLD AUTO: 6.8 %
NEUTROPHILS # BLD AUTO: 5.12 K/UL
NEUTROPHILS NFR BLD AUTO: 68.1 %
PLATELET # BLD AUTO: 281 K/UL
RBC # BLD: 3.45 M/UL
RBC # FLD: 15.2 %
T PALLIDUM AB SER QL IA: NEGATIVE
WBC # FLD AUTO: 7.52 K/UL

## 2024-01-18 ENCOUNTER — APPOINTMENT (OUTPATIENT)
Dept: OBGYN | Facility: CLINIC | Age: 34
End: 2024-01-18
Payer: MEDICAID

## 2024-01-18 ENCOUNTER — NON-APPOINTMENT (OUTPATIENT)
Age: 34
End: 2024-01-18

## 2024-01-18 ENCOUNTER — OUTPATIENT (OUTPATIENT)
Dept: OUTPATIENT SERVICES | Facility: HOSPITAL | Age: 34
LOS: 1 days | End: 2024-01-18
Payer: MEDICAID

## 2024-01-18 VITALS
BODY MASS INDEX: 30.73 KG/M2 | WEIGHT: 167 LBS | DIASTOLIC BLOOD PRESSURE: 68 MMHG | HEIGHT: 62 IN | SYSTOLIC BLOOD PRESSURE: 100 MMHG

## 2024-01-18 DIAGNOSIS — Z23 ENCOUNTER FOR IMMUNIZATION: ICD-10-CM

## 2024-01-18 DIAGNOSIS — Z34.90 ENCOUNTER FOR SUPERVISION OF NORMAL PREGNANCY, UNSPECIFIED, UNSPECIFIED TRIMESTER: ICD-10-CM

## 2024-01-18 DIAGNOSIS — Z98.890 OTHER SPECIFIED POSTPROCEDURAL STATES: Chronic | ICD-10-CM

## 2024-01-18 PROCEDURE — 99213 OFFICE O/P EST LOW 20 MIN: CPT

## 2024-01-18 PROCEDURE — 81002 URINALYSIS NONAUTO W/O SCOPE: CPT

## 2024-01-18 PROCEDURE — 36415 COLL VENOUS BLD VENIPUNCTURE: CPT

## 2024-01-18 RX ORDER — CHLORHEXIDINE GLUCONATE 4 %
325 (65 FE) LIQUID (ML) TOPICAL
Qty: 30 | Refills: 11 | Status: ACTIVE | COMMUNITY
Start: 2024-01-18 | End: 1900-01-01

## 2024-01-18 RX ORDER — DOCUSATE SODIUM 100 MG/1
100 CAPSULE ORAL
Qty: 30 | Refills: 11 | Status: ACTIVE | COMMUNITY
Start: 2024-01-18 | End: 1900-01-01

## 2024-01-18 RX ORDER — ASCORBIC ACID 500 MG
500 TABLET ORAL DAILY
Qty: 30 | Refills: 9 | Status: ACTIVE | COMMUNITY
Start: 2024-01-18 | End: 1900-01-01

## 2024-01-19 LAB
BILIRUB UR QL STRIP: NORMAL
CLARITY UR: CLEAR
COLLECTION METHOD: NORMAL
GLUCOSE UR-MCNC: NORMAL
HCG UR QL: 0.2 EU/DL
HGB UR QL STRIP.AUTO: NORMAL
KETONES UR-MCNC: NORMAL
LEUKOCYTE ESTERASE UR QL STRIP: NORMAL
NITRITE UR QL STRIP: NORMAL
PH UR STRIP: 6.5
PROT UR STRIP-MCNC: NORMAL
SP GR UR STRIP: 1020

## 2024-01-24 DIAGNOSIS — Z34.90 ENCOUNTER FOR SUPERVISION OF NORMAL PREGNANCY, UNSPECIFIED, UNSPECIFIED TRIMESTER: ICD-10-CM

## 2024-02-08 ENCOUNTER — OUTPATIENT (OUTPATIENT)
Dept: OUTPATIENT SERVICES | Facility: HOSPITAL | Age: 34
LOS: 1 days | End: 2024-02-08
Payer: MEDICAID

## 2024-02-08 ENCOUNTER — NON-APPOINTMENT (OUTPATIENT)
Age: 34
End: 2024-02-08

## 2024-02-08 ENCOUNTER — APPOINTMENT (OUTPATIENT)
Dept: OBGYN | Facility: CLINIC | Age: 34
End: 2024-02-08
Payer: MEDICAID

## 2024-02-08 VITALS — DIASTOLIC BLOOD PRESSURE: 60 MMHG | WEIGHT: 171 LBS | SYSTOLIC BLOOD PRESSURE: 100 MMHG | BODY MASS INDEX: 31.28 KG/M2

## 2024-02-08 DIAGNOSIS — Z98.890 OTHER SPECIFIED POSTPROCEDURAL STATES: Chronic | ICD-10-CM

## 2024-02-08 DIAGNOSIS — Z34.90 ENCOUNTER FOR SUPERVISION OF NORMAL PREGNANCY, UNSPECIFIED, UNSPECIFIED TRIMESTER: ICD-10-CM

## 2024-02-08 PROCEDURE — 99213 OFFICE O/P EST LOW 20 MIN: CPT

## 2024-02-08 PROCEDURE — 81002 URINALYSIS NONAUTO W/O SCOPE: CPT

## 2024-02-12 NOTE — ASU PREOP CHECKLIST - NSSDAENDDT_GEN_ALL_CORE
Pre op call placed. Pt sounding like he has cold. He said he is on the end of the cold. Denies fever, and cough. Reached out to update annie  [tavr coordinator]. Plan is a follow up call to pt today.  
15-Oct-2019 07:24

## 2024-02-13 ENCOUNTER — ASOB RESULT (OUTPATIENT)
Age: 34
End: 2024-02-13

## 2024-02-13 ENCOUNTER — APPOINTMENT (OUTPATIENT)
Dept: ANTEPARTUM | Facility: CLINIC | Age: 34
End: 2024-02-13
Payer: MEDICAID

## 2024-02-13 ENCOUNTER — OUTPATIENT (OUTPATIENT)
Dept: OUTPATIENT SERVICES | Facility: HOSPITAL | Age: 34
LOS: 1 days | End: 2024-02-13
Payer: MEDICAID

## 2024-02-13 DIAGNOSIS — Z34.90 ENCOUNTER FOR SUPERVISION OF NORMAL PREGNANCY, UNSPECIFIED, UNSPECIFIED TRIMESTER: ICD-10-CM

## 2024-02-13 DIAGNOSIS — Z98.890 OTHER SPECIFIED POSTPROCEDURAL STATES: Chronic | ICD-10-CM

## 2024-02-13 PROCEDURE — 76816 OB US FOLLOW-UP PER FETUS: CPT | Mod: 26

## 2024-02-13 PROCEDURE — 76816 OB US FOLLOW-UP PER FETUS: CPT

## 2024-02-14 DIAGNOSIS — Z36.4 ENCOUNTER FOR ANTENATAL SCREENING FOR FETAL GROWTH RETARDATION: ICD-10-CM

## 2024-02-14 DIAGNOSIS — Z3A.32 32 WEEKS GESTATION OF PREGNANCY: ICD-10-CM

## 2024-02-21 ENCOUNTER — NON-APPOINTMENT (OUTPATIENT)
Age: 34
End: 2024-02-21

## 2024-02-22 ENCOUNTER — OUTPATIENT (OUTPATIENT)
Dept: OUTPATIENT SERVICES | Facility: HOSPITAL | Age: 34
LOS: 1 days | End: 2024-02-22
Payer: MEDICAID

## 2024-02-22 ENCOUNTER — APPOINTMENT (OUTPATIENT)
Dept: OBGYN | Facility: CLINIC | Age: 34
End: 2024-02-22
Payer: MEDICAID

## 2024-02-22 VITALS
BODY MASS INDEX: 32.57 KG/M2 | WEIGHT: 177 LBS | DIASTOLIC BLOOD PRESSURE: 60 MMHG | SYSTOLIC BLOOD PRESSURE: 100 MMHG | HEIGHT: 62 IN

## 2024-02-22 DIAGNOSIS — Z98.890 OTHER SPECIFIED POSTPROCEDURAL STATES: Chronic | ICD-10-CM

## 2024-02-22 DIAGNOSIS — Z34.90 ENCOUNTER FOR SUPERVISION OF NORMAL PREGNANCY, UNSPECIFIED, UNSPECIFIED TRIMESTER: ICD-10-CM

## 2024-02-22 PROCEDURE — 99213 OFFICE O/P EST LOW 20 MIN: CPT

## 2024-02-22 PROCEDURE — 81002 URINALYSIS NONAUTO W/O SCOPE: CPT

## 2024-02-22 RX ORDER — FLUOCINOLONE ACETONIDE 0.11 MG/ML
0.01 OIL TOPICAL
Qty: 1 | Refills: 0 | Status: ACTIVE | COMMUNITY
Start: 2024-02-22 | End: 1900-01-01

## 2024-02-22 RX ORDER — CHLORHEXIDINE GLUCONATE 4 %
325 (65 FE) LIQUID (ML) TOPICAL TWICE DAILY
Qty: 120 | Refills: 1 | Status: ACTIVE | COMMUNITY
Start: 2024-02-22 | End: 1900-01-01

## 2024-02-29 DIAGNOSIS — Z34.90 ENCOUNTER FOR SUPERVISION OF NORMAL PREGNANCY, UNSPECIFIED, UNSPECIFIED TRIMESTER: ICD-10-CM

## 2024-03-05 LAB
BILIRUB UR QL STRIP: NORMAL
CLARITY UR: CLEAR
COLLECTION METHOD: NORMAL
GLUCOSE UR-MCNC: NORMAL
HCG UR QL: 0.2 EU/DL
HGB UR QL STRIP.AUTO: NORMAL
KETONES UR-MCNC: NORMAL
LEUKOCYTE ESTERASE UR QL STRIP: NORMAL
NITRITE UR QL STRIP: NORMAL
PH UR STRIP: 6
PROT UR STRIP-MCNC: NORMAL
SP GR UR STRIP: 1.02

## 2024-03-06 ENCOUNTER — NON-APPOINTMENT (OUTPATIENT)
Age: 34
End: 2024-03-06

## 2024-03-06 ENCOUNTER — OUTPATIENT (OUTPATIENT)
Dept: OUTPATIENT SERVICES | Facility: HOSPITAL | Age: 34
LOS: 1 days | End: 2024-03-06
Payer: MEDICAID

## 2024-03-06 DIAGNOSIS — Z34.90 ENCOUNTER FOR SUPERVISION OF NORMAL PREGNANCY, UNSPECIFIED, UNSPECIFIED TRIMESTER: ICD-10-CM

## 2024-03-06 DIAGNOSIS — Z98.890 OTHER SPECIFIED POSTPROCEDURAL STATES: Chronic | ICD-10-CM

## 2024-03-06 PROCEDURE — 83036 HEMOGLOBIN GLYCOSYLATED A1C: CPT

## 2024-03-06 PROCEDURE — 85027 COMPLETE CBC AUTOMATED: CPT

## 2024-03-06 PROCEDURE — 87389 HIV-1 AG W/HIV-1&-2 AB AG IA: CPT

## 2024-03-06 PROCEDURE — 86780 TREPONEMA PALLIDUM: CPT

## 2024-03-06 PROCEDURE — 36415 COLL VENOUS BLD VENIPUNCTURE: CPT

## 2024-03-07 ENCOUNTER — APPOINTMENT (OUTPATIENT)
Dept: OBGYN | Facility: CLINIC | Age: 34
End: 2024-03-07
Payer: MEDICAID

## 2024-03-07 ENCOUNTER — OUTPATIENT (OUTPATIENT)
Dept: OUTPATIENT SERVICES | Facility: HOSPITAL | Age: 34
LOS: 1 days | End: 2024-03-07
Payer: MEDICAID

## 2024-03-07 VITALS
SYSTOLIC BLOOD PRESSURE: 100 MMHG | WEIGHT: 179 LBS | HEIGHT: 62 IN | BODY MASS INDEX: 32.94 KG/M2 | DIASTOLIC BLOOD PRESSURE: 60 MMHG

## 2024-03-07 DIAGNOSIS — Z34.90 ENCOUNTER FOR SUPERVISION OF NORMAL PREGNANCY, UNSPECIFIED, UNSPECIFIED TRIMESTER: ICD-10-CM

## 2024-03-07 DIAGNOSIS — Z98.890 OTHER SPECIFIED POSTPROCEDURAL STATES: Chronic | ICD-10-CM

## 2024-03-07 PROCEDURE — 87491 CHLMYD TRACH DNA AMP PROBE: CPT

## 2024-03-07 PROCEDURE — 87653 STREP B DNA AMP PROBE: CPT

## 2024-03-07 PROCEDURE — 99213 OFFICE O/P EST LOW 20 MIN: CPT

## 2024-03-07 PROCEDURE — 87661 TRICHOMONAS VAGINALIS AMPLIF: CPT

## 2024-03-07 PROCEDURE — 87591 N.GONORRHOEAE DNA AMP PROB: CPT

## 2024-03-07 PROCEDURE — 81002 URINALYSIS NONAUTO W/O SCOPE: CPT

## 2024-03-07 RX ORDER — HYDROCORTISONE 10 MG/G
1 CREAM TOPICAL
Qty: 1 | Refills: 2 | Status: ACTIVE | COMMUNITY
Start: 2024-03-07 | End: 1900-01-01

## 2024-03-11 LAB
BASOPHILS # BLD AUTO: 0.02 K/UL
BASOPHILS NFR BLD AUTO: 0.3 %
C TRACH RRNA SPEC QL NAA+PROBE: NOT DETECTED
EOSINOPHIL # BLD AUTO: 0.19 K/UL
EOSINOPHIL NFR BLD AUTO: 2.7 %
ESTIMATED AVERAGE GLUCOSE: 117 MG/DL
GP B STREP DNA SPEC QL NAA+PROBE: NOT DETECTED
HBA1C MFR BLD HPLC: 5.7 %
HCT VFR BLD CALC: 29.6 %
HGB BLD-MCNC: 9.5 G/DL
HIV1+2 AB SPEC QL IA.RAPID: NONREACTIVE
IMM GRANULOCYTES NFR BLD AUTO: 0.6 %
LYMPHOCYTES # BLD AUTO: 1.82 K/UL
LYMPHOCYTES NFR BLD AUTO: 25.9 %
MAN DIFF?: NORMAL
MCHC RBC-ENTMCNC: 28.8 PG
MCHC RBC-ENTMCNC: 32.1 G/DL
MCV RBC AUTO: 89.7 FL
MONOCYTES # BLD AUTO: 0.57 K/UL
MONOCYTES NFR BLD AUTO: 8.1 %
N GONORRHOEA RRNA SPEC QL NAA+PROBE: NOT DETECTED
NEUTROPHILS # BLD AUTO: 4.39 K/UL
NEUTROPHILS NFR BLD AUTO: 62.4 %
PLATELET # BLD AUTO: 263 K/UL
PMV BLD AUTO: 0 /100 WBCS
RBC # BLD: 3.3 M/UL
RBC # FLD: 14.6 %
SOURCE AMPLIFICATION: NORMAL
SOURCE AMPLIFICATION: NORMAL
SOURCE GBS: NORMAL
T PALLIDUM AB SER QL IA: NEGATIVE
T VAGINALIS RRNA SPEC QL NAA+PROBE: NOT DETECTED
WBC # FLD AUTO: 7.03 K/UL

## 2024-03-13 ENCOUNTER — NON-APPOINTMENT (OUTPATIENT)
Age: 34
End: 2024-03-13

## 2024-03-13 ENCOUNTER — OUTPATIENT (OUTPATIENT)
Dept: OUTPATIENT SERVICES | Facility: HOSPITAL | Age: 34
LOS: 1 days | End: 2024-03-13
Payer: MEDICAID

## 2024-03-13 DIAGNOSIS — Z01.20 ENCOUNTER FOR DENTAL EXAMINATION AND CLEANING WITHOUT ABNORMAL FINDINGS: ICD-10-CM

## 2024-03-13 DIAGNOSIS — Z98.890 OTHER SPECIFIED POSTPROCEDURAL STATES: Chronic | ICD-10-CM

## 2024-03-13 PROCEDURE — D0150: CPT

## 2024-03-13 PROCEDURE — D1110: CPT

## 2024-03-14 ENCOUNTER — NON-APPOINTMENT (OUTPATIENT)
Age: 34
End: 2024-03-14

## 2024-03-14 ENCOUNTER — APPOINTMENT (OUTPATIENT)
Dept: OBGYN | Facility: CLINIC | Age: 34
End: 2024-03-14
Payer: MEDICAID

## 2024-03-14 ENCOUNTER — OUTPATIENT (OUTPATIENT)
Dept: OUTPATIENT SERVICES | Facility: HOSPITAL | Age: 34
LOS: 1 days | End: 2024-03-14
Payer: MEDICAID

## 2024-03-14 VITALS — DIASTOLIC BLOOD PRESSURE: 70 MMHG | SYSTOLIC BLOOD PRESSURE: 104 MMHG | BODY MASS INDEX: 32.92 KG/M2 | WEIGHT: 180 LBS

## 2024-03-14 DIAGNOSIS — Z34.90 ENCOUNTER FOR SUPERVISION OF NORMAL PREGNANCY, UNSPECIFIED, UNSPECIFIED TRIMESTER: ICD-10-CM

## 2024-03-14 DIAGNOSIS — Z98.890 OTHER SPECIFIED POSTPROCEDURAL STATES: Chronic | ICD-10-CM

## 2024-03-14 PROCEDURE — 99213 OFFICE O/P EST LOW 20 MIN: CPT

## 2024-03-14 PROCEDURE — 81002 URINALYSIS NONAUTO W/O SCOPE: CPT

## 2024-03-15 DIAGNOSIS — Z34.90 ENCOUNTER FOR SUPERVISION OF NORMAL PREGNANCY, UNSPECIFIED, UNSPECIFIED TRIMESTER: ICD-10-CM

## 2024-03-15 LAB
BILIRUB UR QL STRIP: NORMAL
CLARITY UR: CLEAR
COLLECTION METHOD: NORMAL
GLUCOSE UR-MCNC: NORMAL
HCG UR QL: 0.2 EU/DL
HGB UR QL STRIP.AUTO: NORMAL
KETONES UR-MCNC: NORMAL
LEUKOCYTE ESTERASE UR QL STRIP: NORMAL
NITRITE UR QL STRIP: NORMAL
PH UR STRIP: 7
PROT UR STRIP-MCNC: NORMAL
SP GR UR STRIP: 1010

## 2024-03-18 DIAGNOSIS — Z01.21 ENCOUNTER FOR DENTAL EXAMINATION AND CLEANING WITH ABNORMAL FINDINGS: ICD-10-CM

## 2024-03-21 ENCOUNTER — NON-APPOINTMENT (OUTPATIENT)
Age: 34
End: 2024-03-21

## 2024-03-21 ENCOUNTER — OUTPATIENT (OUTPATIENT)
Dept: OUTPATIENT SERVICES | Facility: HOSPITAL | Age: 34
LOS: 1 days | End: 2024-03-21
Payer: MEDICAID

## 2024-03-21 ENCOUNTER — APPOINTMENT (OUTPATIENT)
Dept: OBGYN | Facility: CLINIC | Age: 34
End: 2024-03-21
Payer: MEDICAID

## 2024-03-21 VITALS
DIASTOLIC BLOOD PRESSURE: 66 MMHG | SYSTOLIC BLOOD PRESSURE: 117 MMHG | WEIGHT: 183.06 LBS | HEIGHT: 62 IN | BODY MASS INDEX: 33.68 KG/M2

## 2024-03-21 DIAGNOSIS — Z98.890 OTHER SPECIFIED POSTPROCEDURAL STATES: Chronic | ICD-10-CM

## 2024-03-21 DIAGNOSIS — Z34.90 ENCOUNTER FOR SUPERVISION OF NORMAL PREGNANCY, UNSPECIFIED, UNSPECIFIED TRIMESTER: ICD-10-CM

## 2024-03-21 PROCEDURE — 99213 OFFICE O/P EST LOW 20 MIN: CPT

## 2024-03-21 PROCEDURE — 81002 URINALYSIS NONAUTO W/O SCOPE: CPT

## 2024-03-22 ENCOUNTER — APPOINTMENT (OUTPATIENT)
Dept: OBGYN | Facility: CLINIC | Age: 34
End: 2024-03-22

## 2024-03-28 ENCOUNTER — APPOINTMENT (OUTPATIENT)
Dept: OBGYN | Facility: CLINIC | Age: 34
End: 2024-03-28
Payer: MEDICAID

## 2024-03-28 ENCOUNTER — OUTPATIENT (OUTPATIENT)
Dept: OUTPATIENT SERVICES | Facility: HOSPITAL | Age: 34
LOS: 1 days | End: 2024-03-28
Payer: MEDICAID

## 2024-03-28 VITALS
SYSTOLIC BLOOD PRESSURE: 112 MMHG | WEIGHT: 185.06 LBS | HEIGHT: 62 IN | BODY MASS INDEX: 34.05 KG/M2 | DIASTOLIC BLOOD PRESSURE: 65 MMHG

## 2024-03-28 DIAGNOSIS — Z34.90 ENCOUNTER FOR SUPERVISION OF NORMAL PREGNANCY, UNSPECIFIED, UNSPECIFIED TRIMESTER: ICD-10-CM

## 2024-03-28 LAB
BILIRUB UR QL STRIP: NORMAL
CLARITY UR: CLEAR
COLLECTION METHOD: NORMAL
GLUCOSE UR-MCNC: NORMAL
HCG UR QL: 0.2 EU/DL
HGB UR QL STRIP.AUTO: NORMAL
KETONES UR-MCNC: NORMAL
LEUKOCYTE ESTERASE UR QL STRIP: NORMAL
NITRITE UR QL STRIP: NORMAL
PH UR STRIP: 6.5
PROT UR STRIP-MCNC: NORMAL
SP GR UR STRIP: 1.01

## 2024-03-28 PROCEDURE — 99213 OFFICE O/P EST LOW 20 MIN: CPT

## 2024-03-28 PROCEDURE — 81002 URINALYSIS NONAUTO W/O SCOPE: CPT

## 2024-03-29 DIAGNOSIS — Z34.90 ENCOUNTER FOR SUPERVISION OF NORMAL PREGNANCY, UNSPECIFIED, UNSPECIFIED TRIMESTER: ICD-10-CM

## 2024-04-03 ENCOUNTER — NON-APPOINTMENT (OUTPATIENT)
Age: 34
End: 2024-04-03

## 2024-04-04 ENCOUNTER — APPOINTMENT (OUTPATIENT)
Dept: OBGYN | Facility: CLINIC | Age: 34
End: 2024-04-04

## 2024-04-04 ENCOUNTER — INPATIENT (INPATIENT)
Facility: HOSPITAL | Age: 34
LOS: 0 days | Discharge: HOME | DRG: 560 | End: 2024-04-05
Attending: OBSTETRICS & GYNECOLOGY | Admitting: OBSTETRICS & GYNECOLOGY
Payer: MEDICAID

## 2024-04-04 VITALS — HEART RATE: 81 BPM | SYSTOLIC BLOOD PRESSURE: 136 MMHG | DIASTOLIC BLOOD PRESSURE: 91 MMHG

## 2024-04-04 DIAGNOSIS — Z98.890 OTHER SPECIFIED POSTPROCEDURAL STATES: Chronic | ICD-10-CM

## 2024-04-04 DIAGNOSIS — Z79.51 LONG TERM (CURRENT) USE OF INHALED STEROIDS: ICD-10-CM

## 2024-04-04 DIAGNOSIS — O26.893 OTHER SPECIFIED PREGNANCY RELATED CONDITIONS, THIRD TRIMESTER: ICD-10-CM

## 2024-04-04 DIAGNOSIS — J45.909 UNSPECIFIED ASTHMA, UNCOMPLICATED: ICD-10-CM

## 2024-04-04 DIAGNOSIS — O26.899 OTHER SPECIFIED PREGNANCY RELATED CONDITIONS, UNSPECIFIED TRIMESTER: ICD-10-CM

## 2024-04-04 DIAGNOSIS — Z14.1 CYSTIC FIBROSIS CARRIER: ICD-10-CM

## 2024-04-04 DIAGNOSIS — Z3A.40 40 WEEKS GESTATION OF PREGNANCY: ICD-10-CM

## 2024-04-04 LAB
ALBUMIN SERPL ELPH-MCNC: 3.8 G/DL — SIGNIFICANT CHANGE UP (ref 3.5–5.2)
ALP SERPL-CCNC: 182 U/L — HIGH (ref 30–115)
ALT FLD-CCNC: 12 U/L — SIGNIFICANT CHANGE UP (ref 0–41)
AMPHET UR-MCNC: NEGATIVE — SIGNIFICANT CHANGE UP
ANION GAP SERPL CALC-SCNC: 15 MMOL/L — HIGH (ref 7–14)
APPEARANCE UR: ABNORMAL
AST SERPL-CCNC: 12 U/L — SIGNIFICANT CHANGE UP (ref 0–41)
BACTERIA # UR AUTO: ABNORMAL /HPF
BARBITURATES UR SCN-MCNC: NEGATIVE — SIGNIFICANT CHANGE UP
BASOPHILS # BLD AUTO: 0.02 K/UL — SIGNIFICANT CHANGE UP (ref 0–0.2)
BASOPHILS # BLD AUTO: 0.02 K/UL — SIGNIFICANT CHANGE UP (ref 0–0.2)
BASOPHILS NFR BLD AUTO: 0.2 % — SIGNIFICANT CHANGE UP (ref 0–1)
BASOPHILS NFR BLD AUTO: 0.2 % — SIGNIFICANT CHANGE UP (ref 0–1)
BENZODIAZ UR-MCNC: NEGATIVE — SIGNIFICANT CHANGE UP
BILIRUB SERPL-MCNC: <0.2 MG/DL — SIGNIFICANT CHANGE UP (ref 0.2–1.2)
BILIRUB UR-MCNC: NEGATIVE — SIGNIFICANT CHANGE UP
BLD GP AB SCN SERPL QL: SIGNIFICANT CHANGE UP
BUN SERPL-MCNC: 9 MG/DL — LOW (ref 10–20)
BUPRENORPHINE SCREEN, URINE RESULT: NEGATIVE — SIGNIFICANT CHANGE UP
CALCIUM SERPL-MCNC: 9.2 MG/DL — SIGNIFICANT CHANGE UP (ref 8.4–10.5)
CAST: 2 /LPF — SIGNIFICANT CHANGE UP (ref 0–4)
CHLORIDE SERPL-SCNC: 102 MMOL/L — SIGNIFICANT CHANGE UP (ref 98–110)
CO2 SERPL-SCNC: 19 MMOL/L — SIGNIFICANT CHANGE UP (ref 17–32)
COCAINE METAB.OTHER UR-MCNC: NEGATIVE — SIGNIFICANT CHANGE UP
COLOR SPEC: ABNORMAL
CREAT ?TM UR-MCNC: 64 MG/DL — SIGNIFICANT CHANGE UP
CREAT SERPL-MCNC: 0.5 MG/DL — LOW (ref 0.7–1.5)
DIFF PNL FLD: ABNORMAL
EGFR: 128 ML/MIN/1.73M2 — SIGNIFICANT CHANGE UP
EOSINOPHIL # BLD AUTO: 0.09 K/UL — SIGNIFICANT CHANGE UP (ref 0–0.7)
EOSINOPHIL # BLD AUTO: 0.09 K/UL — SIGNIFICANT CHANGE UP (ref 0–0.7)
EOSINOPHIL NFR BLD AUTO: 0.7 % — SIGNIFICANT CHANGE UP (ref 0–8)
EOSINOPHIL NFR BLD AUTO: 0.7 % — SIGNIFICANT CHANGE UP (ref 0–8)
FENTANYL UR QL: NEGATIVE — SIGNIFICANT CHANGE UP
GLUCOSE SERPL-MCNC: 100 MG/DL — HIGH (ref 70–99)
GLUCOSE UR QL: NEGATIVE MG/DL — SIGNIFICANT CHANGE UP
HCT VFR BLD CALC: 26.9 % — LOW (ref 37–47)
HCT VFR BLD CALC: 32.3 % — LOW (ref 37–47)
HGB BLD-MCNC: 10.8 G/DL — LOW (ref 12–16)
HGB BLD-MCNC: 9.1 G/DL — LOW (ref 12–16)
IMM GRANULOCYTES NFR BLD AUTO: 0.7 % — HIGH (ref 0.1–0.3)
IMM GRANULOCYTES NFR BLD AUTO: 0.8 % — HIGH (ref 0.1–0.3)
KETONES UR-MCNC: NEGATIVE MG/DL — SIGNIFICANT CHANGE UP
L&D DRUG SCREEN, URINE: SIGNIFICANT CHANGE UP
LDH SERPL L TO P-CCNC: 221 — SIGNIFICANT CHANGE UP (ref 50–242)
LEUKOCYTE ESTERASE UR-ACNC: ABNORMAL
LYMPHOCYTES # BLD AUTO: 16.9 % — LOW (ref 20.5–51.1)
LYMPHOCYTES # BLD AUTO: 16.9 % — LOW (ref 20.5–51.1)
LYMPHOCYTES # BLD AUTO: 2.2 K/UL — SIGNIFICANT CHANGE UP (ref 1.2–3.4)
LYMPHOCYTES # BLD AUTO: 2.2 K/UL — SIGNIFICANT CHANGE UP (ref 1.2–3.4)
MCHC RBC-ENTMCNC: 28.1 PG — SIGNIFICANT CHANGE UP (ref 27–31)
MCHC RBC-ENTMCNC: 28.4 PG — SIGNIFICANT CHANGE UP (ref 27–31)
MCHC RBC-ENTMCNC: 33.4 G/DL — SIGNIFICANT CHANGE UP (ref 32–37)
MCHC RBC-ENTMCNC: 33.8 G/DL — SIGNIFICANT CHANGE UP (ref 32–37)
MCV RBC AUTO: 83.9 FL — SIGNIFICANT CHANGE UP (ref 81–99)
MCV RBC AUTO: 84.1 FL — SIGNIFICANT CHANGE UP (ref 81–99)
METHADONE UR-MCNC: NEGATIVE — SIGNIFICANT CHANGE UP
MONOCYTES # BLD AUTO: 0.91 K/UL — HIGH (ref 0.1–0.6)
MONOCYTES # BLD AUTO: 1.07 K/UL — HIGH (ref 0.1–0.6)
MONOCYTES NFR BLD AUTO: 7 % — SIGNIFICANT CHANGE UP (ref 1.7–9.3)
MONOCYTES NFR BLD AUTO: 8.2 % — SIGNIFICANT CHANGE UP (ref 1.7–9.3)
NEUTROPHILS # BLD AUTO: 9.5 K/UL — HIGH (ref 1.4–6.5)
NEUTROPHILS # BLD AUTO: 9.69 K/UL — HIGH (ref 1.4–6.5)
NEUTROPHILS NFR BLD AUTO: 73.2 % — SIGNIFICANT CHANGE UP (ref 42.2–75.2)
NEUTROPHILS NFR BLD AUTO: 74.5 % — SIGNIFICANT CHANGE UP (ref 42.2–75.2)
NITRITE UR-MCNC: NEGATIVE — SIGNIFICANT CHANGE UP
NRBC # BLD: 0 /100 WBCS — SIGNIFICANT CHANGE UP (ref 0–0)
NRBC # BLD: 0 /100 WBCS — SIGNIFICANT CHANGE UP (ref 0–0)
OPIATES UR-MCNC: NEGATIVE — SIGNIFICANT CHANGE UP
OXYCODONE UR-MCNC: NEGATIVE — SIGNIFICANT CHANGE UP
PCP UR-MCNC: NEGATIVE — SIGNIFICANT CHANGE UP
PH UR: 7 — SIGNIFICANT CHANGE UP (ref 5–8)
PLATELET # BLD AUTO: 271 K/UL — SIGNIFICANT CHANGE UP (ref 130–400)
PLATELET # BLD AUTO: 301 K/UL — SIGNIFICANT CHANGE UP (ref 130–400)
PMV BLD: 11.4 FL — HIGH (ref 7.4–10.4)
PMV BLD: 11.5 FL — HIGH (ref 7.4–10.4)
POTASSIUM SERPL-MCNC: 3.9 MMOL/L — SIGNIFICANT CHANGE UP (ref 3.5–5)
POTASSIUM SERPL-SCNC: 3.9 MMOL/L — SIGNIFICANT CHANGE UP (ref 3.5–5)
PRENATAL SYPHILIS TEST: SIGNIFICANT CHANGE UP
PROPOXYPHENE QUALITATIVE URINE RESULT: NEGATIVE — SIGNIFICANT CHANGE UP
PROT ?TM UR-MCNC: 56 MG/DLG/24H — SIGNIFICANT CHANGE UP
PROT SERPL-MCNC: 6.8 G/DL — SIGNIFICANT CHANGE UP (ref 6–8)
PROT UR-MCNC: 100 MG/DL
PROT/CREAT UR-RTO: 0.9 RATIO — HIGH (ref 0–0.2)
RBC # BLD: 3.2 M/UL — LOW (ref 4.2–5.4)
RBC # BLD: 3.85 M/UL — LOW (ref 4.2–5.4)
RBC # FLD: 14.4 % — SIGNIFICANT CHANGE UP (ref 11.5–14.5)
RBC # FLD: 14.5 % — SIGNIFICANT CHANGE UP (ref 11.5–14.5)
RBC CASTS # UR COMP ASSIST: 387 /HPF — HIGH (ref 0–4)
SODIUM SERPL-SCNC: 136 MMOL/L — SIGNIFICANT CHANGE UP (ref 135–146)
SP GR SPEC: 1.01 — SIGNIFICANT CHANGE UP (ref 1–1.03)
SQUAMOUS # UR AUTO: 16 /HPF — HIGH (ref 0–5)
URATE SERPL-MCNC: 3.3 MG/DL — SIGNIFICANT CHANGE UP (ref 2.5–7)
UROBILINOGEN FLD QL: 1 MG/DL — SIGNIFICANT CHANGE UP (ref 0.2–1)
WBC # BLD: 12.99 K/UL — HIGH (ref 4.8–10.8)
WBC # BLD: 13 K/UL — HIGH (ref 4.8–10.8)
WBC # FLD AUTO: 12.99 K/UL — HIGH (ref 4.8–10.8)
WBC # FLD AUTO: 13 K/UL — HIGH (ref 4.8–10.8)
WBC UR QL: 90 /HPF — HIGH (ref 0–5)

## 2024-04-04 PROCEDURE — 86901 BLOOD TYPING SEROLOGIC RH(D): CPT

## 2024-04-04 PROCEDURE — 85025 COMPLETE CBC W/AUTO DIFF WBC: CPT

## 2024-04-04 PROCEDURE — 81001 URINALYSIS AUTO W/SCOPE: CPT

## 2024-04-04 PROCEDURE — 84550 ASSAY OF BLOOD/URIC ACID: CPT

## 2024-04-04 PROCEDURE — 86850 RBC ANTIBODY SCREEN: CPT

## 2024-04-04 PROCEDURE — 59050 FETAL MONITOR W/REPORT: CPT

## 2024-04-04 PROCEDURE — 82570 ASSAY OF URINE CREATININE: CPT

## 2024-04-04 PROCEDURE — 36415 COLL VENOUS BLD VENIPUNCTURE: CPT

## 2024-04-04 PROCEDURE — 83615 LACTATE (LD) (LDH) ENZYME: CPT

## 2024-04-04 PROCEDURE — 80354 DRUG SCREENING FENTANYL: CPT

## 2024-04-04 PROCEDURE — 80053 COMPREHEN METABOLIC PANEL: CPT

## 2024-04-04 PROCEDURE — 59409 OBSTETRICAL CARE: CPT | Mod: U9

## 2024-04-04 PROCEDURE — 84156 ASSAY OF PROTEIN URINE: CPT

## 2024-04-04 PROCEDURE — 80307 DRUG TEST PRSMV CHEM ANLYZR: CPT

## 2024-04-04 PROCEDURE — 86592 SYPHILIS TEST NON-TREP QUAL: CPT

## 2024-04-04 PROCEDURE — 86900 BLOOD TYPING SEROLOGIC ABO: CPT

## 2024-04-04 RX ORDER — OXYCODONE HYDROCHLORIDE 5 MG/1
5 TABLET ORAL
Refills: 0 | Status: DISCONTINUED | OUTPATIENT
Start: 2024-04-04 | End: 2024-04-05

## 2024-04-04 RX ORDER — AER TRAVELER 0.5 G/1
1 SOLUTION RECTAL; TOPICAL EVERY 4 HOURS
Refills: 0 | Status: DISCONTINUED | OUTPATIENT
Start: 2024-04-04 | End: 2024-04-05

## 2024-04-04 RX ORDER — OXYTOCIN 10 UNIT/ML
41.67 VIAL (ML) INJECTION
Qty: 20 | Refills: 0 | Status: DISCONTINUED | OUTPATIENT
Start: 2024-04-04 | End: 2024-04-05

## 2024-04-04 RX ORDER — SODIUM CHLORIDE 9 MG/ML
1000 INJECTION, SOLUTION INTRAVENOUS
Refills: 0 | Status: DISCONTINUED | OUTPATIENT
Start: 2024-04-04 | End: 2024-04-04

## 2024-04-04 RX ORDER — HYDROCORTISONE 1 %
1 OINTMENT (GRAM) TOPICAL EVERY 6 HOURS
Refills: 0 | Status: DISCONTINUED | OUTPATIENT
Start: 2024-04-04 | End: 2024-04-05

## 2024-04-04 RX ORDER — IBUPROFEN 200 MG
600 TABLET ORAL EVERY 6 HOURS
Refills: 0 | Status: COMPLETED | OUTPATIENT
Start: 2024-04-04 | End: 2025-03-03

## 2024-04-04 RX ORDER — OXYTOCIN 10 UNIT/ML
333.33 VIAL (ML) INJECTION
Qty: 20 | Refills: 0 | Status: DISCONTINUED | OUTPATIENT
Start: 2024-04-04 | End: 2024-04-04

## 2024-04-04 RX ORDER — DIBUCAINE 1 %
1 OINTMENT (GRAM) RECTAL EVERY 6 HOURS
Refills: 0 | Status: DISCONTINUED | OUTPATIENT
Start: 2024-04-04 | End: 2024-04-05

## 2024-04-04 RX ORDER — MAGNESIUM HYDROXIDE 400 MG/1
30 TABLET, CHEWABLE ORAL
Refills: 0 | Status: DISCONTINUED | OUTPATIENT
Start: 2024-04-04 | End: 2024-04-05

## 2024-04-04 RX ORDER — PRAMOXINE HYDROCHLORIDE 150 MG/15G
1 AEROSOL, FOAM RECTAL EVERY 4 HOURS
Refills: 0 | Status: DISCONTINUED | OUTPATIENT
Start: 2024-04-04 | End: 2024-04-05

## 2024-04-04 RX ORDER — IBUPROFEN 200 MG
600 TABLET ORAL EVERY 6 HOURS
Refills: 0 | Status: DISCONTINUED | OUTPATIENT
Start: 2024-04-04 | End: 2024-04-05

## 2024-04-04 RX ORDER — BENZOCAINE 10 %
1 GEL (GRAM) MUCOUS MEMBRANE EVERY 6 HOURS
Refills: 0 | Status: DISCONTINUED | OUTPATIENT
Start: 2024-04-04 | End: 2024-04-05

## 2024-04-04 RX ORDER — OXYCODONE HYDROCHLORIDE 5 MG/1
5 TABLET ORAL ONCE
Refills: 0 | Status: DISCONTINUED | OUTPATIENT
Start: 2024-04-04 | End: 2024-04-05

## 2024-04-04 RX ORDER — CHLORHEXIDINE GLUCONATE 213 G/1000ML
1 SOLUTION TOPICAL DAILY
Refills: 0 | Status: DISCONTINUED | OUTPATIENT
Start: 2024-04-04 | End: 2024-04-04

## 2024-04-04 RX ORDER — DIPHENHYDRAMINE HCL 50 MG
25 CAPSULE ORAL EVERY 6 HOURS
Refills: 0 | Status: DISCONTINUED | OUTPATIENT
Start: 2024-04-04 | End: 2024-04-05

## 2024-04-04 RX ORDER — SIMETHICONE 80 MG/1
80 TABLET, CHEWABLE ORAL EVERY 4 HOURS
Refills: 0 | Status: DISCONTINUED | OUTPATIENT
Start: 2024-04-04 | End: 2024-04-05

## 2024-04-04 RX ORDER — SODIUM CHLORIDE 9 MG/ML
3 INJECTION INTRAMUSCULAR; INTRAVENOUS; SUBCUTANEOUS EVERY 8 HOURS
Refills: 0 | Status: DISCONTINUED | OUTPATIENT
Start: 2024-04-04 | End: 2024-04-05

## 2024-04-04 RX ORDER — KETOROLAC TROMETHAMINE 30 MG/ML
30 SYRINGE (ML) INJECTION ONCE
Refills: 0 | Status: DISCONTINUED | OUTPATIENT
Start: 2024-04-04 | End: 2024-04-04

## 2024-04-04 RX ORDER — ACETAMINOPHEN 500 MG
975 TABLET ORAL
Refills: 0 | Status: DISCONTINUED | OUTPATIENT
Start: 2024-04-04 | End: 2024-04-05

## 2024-04-04 RX ORDER — TETANUS TOXOID, REDUCED DIPHTHERIA TOXOID AND ACELLULAR PERTUSSIS VACCINE, ADSORBED 5; 2.5; 8; 8; 2.5 [IU]/.5ML; [IU]/.5ML; UG/.5ML; UG/.5ML; UG/.5ML
0.5 SUSPENSION INTRAMUSCULAR ONCE
Refills: 0 | Status: DISCONTINUED | OUTPATIENT
Start: 2024-04-04 | End: 2024-04-05

## 2024-04-04 RX ORDER — LANOLIN
1 OINTMENT (GRAM) TOPICAL EVERY 6 HOURS
Refills: 0 | Status: DISCONTINUED | OUTPATIENT
Start: 2024-04-04 | End: 2024-04-05

## 2024-04-04 RX ADMIN — Medication 975 MILLIGRAM(S): at 15:33

## 2024-04-04 RX ADMIN — Medication 975 MILLIGRAM(S): at 22:27

## 2024-04-04 RX ADMIN — SODIUM CHLORIDE 3 MILLILITER(S): 9 INJECTION INTRAMUSCULAR; INTRAVENOUS; SUBCUTANEOUS at 22:00

## 2024-04-04 RX ADMIN — Medication 975 MILLIGRAM(S): at 15:03

## 2024-04-04 RX ADMIN — Medication 600 MILLIGRAM(S): at 18:12

## 2024-04-04 RX ADMIN — Medication 30 MILLIGRAM(S): at 05:33

## 2024-04-04 RX ADMIN — Medication 600 MILLIGRAM(S): at 17:42

## 2024-04-04 RX ADMIN — SODIUM CHLORIDE 3 MILLILITER(S): 9 INJECTION INTRAMUSCULAR; INTRAVENOUS; SUBCUTANEOUS at 14:12

## 2024-04-04 RX ADMIN — Medication 975 MILLIGRAM(S): at 21:27

## 2024-04-04 NOTE — OB PROVIDER H&P - NS_DILATION_OBGYN_ALL_OB_NU
Maria 15, 2023     Patient: Jayce Mullen  YOB: 1989  Date of Visit: 6/15/2023      To Whom it May Concern:    Jayce Mullen is under my professional care  Brunilda Araya was seen in my office on 6/15/2023  Brunilda Araya patient may return to work 6/19/23, must wear finger sock and keep clean and dry  If you have any questions or concerns, please don't hesitate to call           Sincerely,          Chandan Teresa MD        CC: No Recipients
4

## 2024-04-04 NOTE — OB PROVIDER H&P - ASSESSMENT
A/P: 32yo  at 40w0d GA, GBS neg, in labor    -admit to labor and delivery  -pain management prn   -continous efm & toco  -admission labs  -IV access   -IV hydration   -diet: clear liquid diet     Case discussed with Dr. Hendrickson and Dr. Cleaning

## 2024-04-04 NOTE — OB PROVIDER H&P - NSLOWPPHRISK_OBGYN_A_OB
No previous uterine incision/[facility] Pregnancy/Less than or equal to 4 previous vaginal births/No known bleeding disorder/No history of postpartum hemorrhage/No other PPH risks indicated

## 2024-04-04 NOTE — OB PROVIDER H&P - NSHPPHYSICALEXAM_GEN_ALL_CORE
Vital Signs Last 24 Hrs  T(C): 36.7 (04 Apr 2024 03:05), Max: 36.7 (04 Apr 2024 03:05)  T(F): 98.1 (04 Apr 2024 03:05), Max: 98.1 (04 Apr 2024 03:05)  HR: 81 (04 Apr 2024 03:05) (81 - 81)  BP: 136/91 (04 Apr 2024 03:05) (136/91 - 136/91)  RR: 18 (04 Apr 2024 03:05) (18 - 18)    Physical Exam  Gen: AAOx3, NAD  Abd: soft, gravid, nontender, no palpable contractions  Ext: no edema or erythema in bilateral upper and lower extremities  SVE: 4/90/-2    EFM: 140/mod/+accels  [place]: q2min

## 2024-04-04 NOTE — OB PROVIDER H&P - HISTORY OF PRESENT ILLNESS
32yo  at 40w0d GA (ANGELINE: 24 by first trimester sonos) presents to labor and delivery with contractions increasing in frequency and duration. Denies vaginal bleeding, leakage of fluids. Endorses good fetal movement. GBS neg.    Pregnancy complicated by:  - CF carrier, FOB neg  - elevated GCT, normal GTT  - echogenic intracerdiac focus, s/p normal fetal echo 34yo  at 40w0d GA (ANGELINE: 24 by first trimester sonos) presents to labor and delivery with contractions increasing in frequency and duration. Denies vaginal bleeding, leakage of fluids. Endorses good fetal movement. GBS neg.    Pregnancy complicated by:  - CF carrier, FOB neg  - elevated GCT, normal GTT  - echogenic intracardiac focus, s/p normal fetal echo

## 2024-04-04 NOTE — OB PROVIDER DELIVERY SUMMARY - NSSELHIDDEN_OBGYN_ALL_OB_FT
[[email or web address]_ALL_OB_FT:[id number]],[NS_DeliveryAssist1_OBGYN_ALL_OB_FT:[id number]]

## 2024-04-04 NOTE — OB PROVIDER H&P - NSFIRSTLIVEBIRTH_OBGYN_ALL_OB_DT
No chief complaint on file.      88 year old female with PMHx of HTN, AFib on Coumadin, and mitral valve prolapse here for follow up for management of DM type 2. Last seen in 2023. SMBx daily.  The pt denies polyuria or polydipsia. Pt does not have numbness and tingling in their feet. No abdominal complaints. No CP or SOB. The pt denies depression and anxiety. No blurry vision. No issues with the feet.    Osteopenia- Pt is on calcium 600 mg + Vitamin D daily. No recent falls or Fx.     Thyroid nodules- No heat or cold intolerance. No changes in bowel habits. Pt denies any tremors or palpitations. No difficulty swallowing or breathing.       Diabetes        General: no fatigue, no fever, no weight loss, no appetite changes   Resp: no dyspnea, no cough   GI: no abdominal pain, no diarrhea, no nausea, no vomiting         Allergies    ALLERGIES:   Allergen Reactions    Codeine Other (See Comments)     Unknown    Erythromycin Nausea & Vomiting    Metformin Other (See Comments)    Morphine Other (See Comments)     Unknown    Sulfa Antibiotics Other (See Comments)     Unknown    Sulfamethoxazole-Trimethoprim Other (See Comments)     Unknown       Current Meds     Current Outpatient Medications   Medication Sig Dispense Refill    insulin degludec (Tresiba FlexTouch) 100 UNIT/ML pen-injector Inject 12 Units into the skin nightly. 15 mL 1    NovoLOG FlexPen 100 UNIT/ML pen-injector Take as per sliding scale bid with meals. MDD 20Prime 2 units before each dose. 15 mL 12    Insulin Pen Needle (B-D U/F PEN NEEDLE) 31G X 5 MM Misc Use to inject insulin 1 times daily. Remove needle cover(s) to expose needle before injecting. 100 each 3    Xarelto 20 MG Tab Take 20 mg by mouth daily.      tamsulosin (FLOMAX) 0.4 MG Cap Take 1 capsule by mouth daily after a meal for 7 days. 7 capsule 0    AMIODarone (PACERONE) 200 MG tablet Take 1 tablet by mouth daily. Do not start before 2022. 30 tablet 0    carvedilol (COREG) 3.125  MG tablet Take 1 tablet by mouth every 12 hours. 60 tablet 0    BD Insulin Syringe U/F 31G X 5/16\" 0.5 ML Misc USE 4 TIMES A  each 3    acetaminophen (TYLENOL) 325 MG tablet Take 650 mg by mouth every 4 hours as needed for Pain.      losartan (COZAAR) 50 MG tablet Take 1 tablet by mouth 2 times daily.      Lancets (FREESTYLE) Misc 3 times daily.  4    Cholecalciferol (VITAMIN D3 PO) Take 1,000 Units by mouth daily.      Calcium Carbonate-Vitamin D (CALCIUM 600+D HIGH POTENCY PO) Take by mouth daily.      atorvastatin (LIPITOR) 40 MG tablet Take 40 mg by mouth daily.      blood glucose test strip        No current facility-administered medications for this visit.       Active Problems    Patient Active Problem List   Diagnosis    Atrial fibrillation (CMD)    Benign essential hypertension    Type 2 diabetes mellitus with microalbuminuria, with long-term current use of insulin (CMD)    Dyslipidemia    Mitral valve prolapse    On continuous oral anticoagulation    Osteopenia    Persistent headaches    Temporal arteritis syndrome (CMD)    Adrenal insufficiency (CMD)    Fall    ICH (intracerebral hemorrhage) (CMD)    Hemorrhagic cerebrovascular accident (CVA) (CMD)    Multiple thyroid nodules    Empty sella syndrome (CMD)       Past Medical History    Past Medical History:   Diagnosis Date    Atrial fibrillation (CMD)     Diabetes mellitus (CMD)     Essential (primary) hypertension     Hyperlipidemia     Irregular heartbeat     Mitral valve prolapse     Osteoporosis     Temporal arteritis (CMD)        Family History  Family History   Problem Relation Age of Onset    Heart disease Mother     Stroke Father        Review  Past medical history, problem list, family medical history, surgical history and social history reviewed.      Vitals  There were no vitals taken for this visit.      Physical Exam  General: No apparent distress, well appearing  HEENT: EOMI, no proptosis, no thyromegaly, no nodules   Chest: CTA, no  crackles or rhonchi   CVS: S1 S2, no murmur   Abd: Soft, non tender   Ext: No edema   CNS: Conscious, alert, oriented  Skin: No rash     Assessment    Patient Active Problem List    Diagnosis Date Noted    Multiple thyroid nodules 07/08/2022     Priority: Low    Empty sella syndrome (CMD) 07/08/2022     Priority: Low    Hemorrhagic cerebrovascular accident (CVA) (CMD) 05/22/2022     Priority: Low    ICH (intracerebral hemorrhage) (CMD) 05/20/2022     Priority: Low    Fall 05/19/2022     Priority: Low    Adrenal insufficiency (CMD) 01/15/2019     Priority: Low    Persistent headaches 08/14/2018     Priority: Low    Dyslipidemia 06/05/2018     Priority: Low    Type 2 diabetes mellitus with microalbuminuria, with long-term current use of insulin (CMD) 03/03/2016     Priority: Low    Atrial fibrillation (CMD) 02/01/2016     Priority: Low    Benign essential hypertension 02/01/2016     Priority: Low    Mitral valve prolapse 02/01/2016     Priority: Low    On continuous oral anticoagulation 02/01/2016     Priority: Low    Osteopenia 02/01/2016     Priority: Low    Temporal arteritis syndrome (CMD) 01/26/2016     Priority: Low       Discussion/Summary  DM Impression:   88 year old female with PMHx of HTN, AFib on Coumadin, and mitral valve prolapse here for follow up for management of DM type 2.      DM type 2, uncontrolled, A1c goal 7.5-8%  Lab Results   Component Value Date    HGBA1C 8.3 (H) 07/17/2023    HGBA1C 9.5 (H) 04/10/2023    CREATININE 0.72 07/17/2023    GFRESTIMATE 72 05/30/2022    TSH 1.787 05/21/2022    MALBCR 103.1 (H) 10/25/2022     HGB (g/dL)   Date Value   07/17/2023 11.5 (L)   - Regimen: Tresiba 12 U daily. Not taking Novolog sliding scale but has it at home. Pt to take Tresiba 10 U daily when she moves to [place]  - Will be moving to [place] and will be establishing over there with other doctors, daughter lives there. ***  - Pt not keen on adding oral DM medications  - Pt has been off Prednisone for a  few years  - Will not use Metformin due to hx of GI s/e and weight loss   - weight: 127 lbs in 8/2023, 129 lbs in 6/2023, 126 lbs in 10/2022   - continue healthy diet and lifestyle modifications      HTN controlled with microalbuminuria   - BP   - Continue Losartan 50 mg BID  - Mgmt per PCP  Lab Results   Component Value Date    MALBCR 103.1 (H) 10/25/2022    POTASSIUM 4.1 07/17/2023     DLD associated with DM type 2  Lab Results   Component Value Date    CHOLESTEROL 105 07/17/2023    CALCLDL 46 07/17/2023    HDL 43 (L) 07/17/2023    TRIGLYCERIDE 75 07/17/2023     Lab Results   Component Value Date    ALKPT 89 07/17/2023    AST 13 07/17/2023    GPT 13 07/17/2023   - Pt taking Atorvastatin 40 mg daily  - continue dietary modifications     Afib  TSH (mcUnits/mL)   Date Value   05/21/2022 1.787   - on amiodarone. rec to check TFTs q 3 months while on amiodarone  - Mgmt per cardiology     Hx of chronic steroid use  - AM Cortisol 15.4 in 4/2019, after stopping   - Mgmt per PCP     Osteopenia  - DEXA in 12/2018 showed osteopenia with lowest T-score of -2.3 in left hip. No comparison available.  - DEXA in 5/2016 showed osteopenia with the left femoral neck T-score of -2.2, Fx risk is moderate.  - DEXA in 4/12/2021 showed worsening osteopenia with the lowest T-score of -2.2 in Left hip and Left femoral neck.   Lab Results   Component Value Date    CREATININE 0.72 07/17/2023    GFRESTIMATE 72 05/30/2022    CALCIUM 9.4 07/17/2023    ALBUMIN 4.5 07/17/2023   - Pt stopped alendronate due to GI side effects  - Previously discussed Reclast. Pt not keen on reclast. She understands benefits of Reclast to reduced risk of Fx  - Continue calcium 600 mg + Vitamin D daily  - Continue Vitamin D 2000 IU daily  - next DEXA scan due, ordered    Multiple Thyroid nodules  Lab Results   Component Value Date    TSH 1.787 05/21/2022   - CTA head and neck from 5/2022 reported Heterogeneous thyroid gland with suspected numerous nodules.  None of  these measure more than 1.5 cm.  - Pt not keen on US, considering age will continue to monitor    Empty Sella  - CT head from 5/20/2022 reported Partially empty sella with a large diaphragmatic sellae.  - CT head from 5/21/2022 reported no pituitary or sella findings      Hx of CVA in 5/2022  - mgmt per PCP      I thank Dr. Pandya and Dr. Pardo for letting me participate in the care of this patient. Please do not hesitate to contact me if you have any questions about this patient.       Plan  Management Plan and Instructions:        Limit the amount of sugar sweetened drinks like soda pop and juice. , For Blood Glucose < 80 mg/dl treat with 4 ounces of juice or 4 glucose tablets (15g). Recheck Blood Glucose in 15 minutes. Repeat until blood glucose is > 80. Call if sugars consistently less than 70 or greater than 300 and Bring your sugar log and meter to each visit    - Please schedule DEXA (bone density) scan  - Continue Tresiba 12 U daily in the morning. Once you go to [place], take Tresiba 10 U daily   - Keep your Novolog sliding scale on the side in case you need it, otherwise you can stay off  - Continue Vitamin D 2000 IU daily  - Continue calcium 600 mg + Vitamin D daily  - Rotate injection sites  - Call the office or send a message through the portal if blood sugars are under 70 or consistently over 200  - Continue healthy diet and lifestyle modifications     Scribe Attestation    On 12/12/2023, ICeline and Jazzmine Wyatt scribed the services personally performed by Carolyn Tavera MD MD Attestation          18-Jan-2009

## 2024-04-04 NOTE — OB PROVIDER H&P - NSHPLABSRESULTS_GEN_ALL_CORE
3/7/24  GBS neg    3/6/24  HgbA1c 5.7  TrepAb neg  HIV NR    1/18/24  GTT 90/111/144/94    1/12/24    TrepAb neg    10/4/23      9/19/23  TrepAb neg  HCV NR  O pos, neg antibody screen  CF carrier pos    7/3/23  measles immune  mumps immune  rubella immune  varicella immune  HIV NR    SONOS  32w5d: vertex, anterior placenta, BPP 8/8, EFW 1983gm (33%ile)  26w6d: normal fetal echo  22w5d: echogenic intracerdiac focus, otherwise comileted normal anatomy scan  20w5d: incomplete anatomy scan with no gross abnormalitiy  13w5d: NT wnl

## 2024-04-04 NOTE — OB PROVIDER H&P - ATTENDING COMMENTS
32 y/o  @ 40 wks, in labor. Admit to labor and delivery. Pain control PRN augment as needed. Anticipate .

## 2024-04-04 NOTE — OB PROVIDER H&P - NS_OBGYNHISTORY_OBGYN_ALL_OB_FT
ObHx:   h/o 2 FT , largest 8-7  h/o 1 SAB, no D+C    GynHx: denies h/o fibroids, ovarian cysts, abnormal PAPs, STIs

## 2024-04-04 NOTE — OB PROVIDER DELIVERY SUMMARY - NSPROVIDERDELIVERYNOTE_OBGYN_ALL_OB_FT
Patient was fully dilated and pushing. Fetal head was OA and restituted to ROT. The anterior and posterior shoulders delivered, followed by the remaining body atraumatically. The  was handed to the mother and RN. Delayed cord clamping was performed, and then clamped and cut. Cord blood gases collected x2. The placenta delivered intact with membranes. Pitocin was administered (+/- additional interventions). Uterus massaged, fundus found to be firm. Cervix, vagina and perineum inspected with no lacerations.     Viable female infant delivered, with APGARs 9/9    EBL 300cc    Dr. Cleaning present for the delivery Dentures

## 2024-04-05 ENCOUNTER — TRANSCRIPTION ENCOUNTER (OUTPATIENT)
Age: 34
End: 2024-04-05

## 2024-04-05 VITALS — DIASTOLIC BLOOD PRESSURE: 66 MMHG | HEART RATE: 77 BPM | SYSTOLIC BLOOD PRESSURE: 114 MMHG | TEMPERATURE: 98 F

## 2024-04-05 PROCEDURE — 99238 HOSP IP/OBS DSCHRG MGMT 30/<: CPT

## 2024-04-05 RX ORDER — SIMETHICONE 80 MG/1
1 TABLET, CHEWABLE ORAL
Qty: 0 | Refills: 0 | DISCHARGE
Start: 2024-04-05

## 2024-04-05 RX ORDER — BENZOCAINE 10 %
1 GEL (GRAM) MUCOUS MEMBRANE
Qty: 0 | Refills: 0 | DISCHARGE
Start: 2024-04-05

## 2024-04-05 RX ORDER — AER TRAVELER 0.5 G/1
1 SOLUTION RECTAL; TOPICAL
Qty: 0 | Refills: 0 | DISCHARGE
Start: 2024-04-05

## 2024-04-05 RX ORDER — MAGNESIUM HYDROXIDE 400 MG/1
30 TABLET, CHEWABLE ORAL
Qty: 0 | Refills: 0 | DISCHARGE
Start: 2024-04-05

## 2024-04-05 RX ORDER — ACETAMINOPHEN 500 MG
3 TABLET ORAL
Qty: 0 | Refills: 0 | DISCHARGE
Start: 2024-04-05

## 2024-04-05 RX ORDER — IBUPROFEN 200 MG
1 TABLET ORAL
Qty: 0 | Refills: 0 | DISCHARGE
Start: 2024-04-05

## 2024-04-05 RX ADMIN — Medication 975 MILLIGRAM(S): at 03:05

## 2024-04-05 RX ADMIN — Medication 975 MILLIGRAM(S): at 04:05

## 2024-04-05 RX ADMIN — Medication 1 SPRAY(S): at 09:05

## 2024-04-05 RX ADMIN — Medication 975 MILLIGRAM(S): at 12:17

## 2024-04-05 RX ADMIN — Medication 600 MILLIGRAM(S): at 00:37

## 2024-04-05 RX ADMIN — SODIUM CHLORIDE 3 MILLILITER(S): 9 INJECTION INTRAMUSCULAR; INTRAVENOUS; SUBCUTANEOUS at 07:17

## 2024-04-05 RX ADMIN — Medication 1 TABLET(S): at 12:17

## 2024-04-05 RX ADMIN — Medication 600 MILLIGRAM(S): at 10:07

## 2024-04-05 RX ADMIN — Medication 600 MILLIGRAM(S): at 01:37

## 2024-04-05 RX ADMIN — Medication 600 MILLIGRAM(S): at 09:07

## 2024-04-05 RX ADMIN — AER TRAVELER 1 APPLICATION(S): 0.5 SOLUTION RECTAL; TOPICAL at 09:05

## 2024-04-05 NOTE — PROGRESS NOTE ADULT - ATTENDING COMMENTS
PPD1 s/p  meeting milestones with labs and VS stable for discharge   Return precautions given.   DC documentation provided

## 2024-04-05 NOTE — DISCHARGE NOTE OB - MEDICATION SUMMARY - MEDICATIONS TO TAKE
I will START or STAY ON the medications listed below when I get home from the hospital:    ibuprofen 600 mg oral tablet  -- 1 tab(s) by mouth every 6 hours  -- Indication: For pain    acetaminophen 325 mg oral tablet  -- 3 tab(s) by mouth every 6 hours  -- Indication: For pain    magnesium hydroxide 8% oral suspension  -- 30 milliliter(s) by mouth 2 times a day As needed Constipation  -- Indication: For constipation    albuterol  -- Indication: For asthma    Breo Ellipta  -- Indication: For asthma    benzocaine 20% topical spray  -- 1 Apply on skin to affected area every 6 hours As needed for Perineal discomfort  -- Indication: For perineal pain    witch hazel 50% topical pad  -- 1 Apply on skin to affected area every 4 hours As needed Perineal discomfort  -- Indication: For perineal pain    Prenatal Multivitamins with Folic Acid 1 mg oral tablet  -- 1 tab(s) by mouth once a day  -- Indication: For vitamins    simethicone 80 mg oral tablet, chewable  -- 1 tab(s) by mouth every 4 hours As needed Gas  -- Indication: For gas pain

## 2024-04-05 NOTE — PROGRESS NOTE ADULT - ASSESSMENT
A/P: 34yo now P3 S/P  PPD1, recovering well     - encourage ambulation, PO hydration  - monitor vitals, bleeding  - routine postpartum course  - anticipate d/c home

## 2024-04-05 NOTE — DISCHARGE NOTE OB - BREASTFEEDING PROVIDES STABLE TEMPERATURE THROUGH SKIN TO SKIN CONTACT
Here for hormone therapy injection, no complaints at this time, Injection given as ordered, tolerated well, no report of pain prior to or after injection. Return to clinic as scheduled.     Site -RB    Testosterone  50 mg  Depo Estradiol  5 mg    Clinic Supplied Medication    
Statement Selected

## 2024-04-05 NOTE — DISCHARGE NOTE OB - CARE PROVIDER_API CALL
Laura Cleaning  Obstetrics and Gynecology  [address] [place] [id number]  Phone: [phone]  Fax: [phone]  Follow Up Time:

## 2024-04-05 NOTE — DISCHARGE NOTE OB - PATIENT PORTAL LINK FT
You can access the FollowMyHealth Patient Portal offered by [facility] by registering at the following website: [email or web address]. By joining [employer]’s FollowMyHealth portal, you will also be able to view your health information using other applications (apps) compatible with our system.

## 2024-04-05 NOTE — DISCHARGE NOTE OB - MATERIALS PROVIDED
[facility] [place] Screening Program/[place]  Immunization Record/Guide to Postpartum Care/[facility] Hearing Screen Program/Back To Sleep Handout/Shaken Baby Prevention Handout/Birth Certificate Instructions

## 2024-04-05 NOTE — DISCHARGE NOTE OB - MEDICATION SUMMARY - MEDICATIONS TO STOP TAKING
I will STOP taking the medications listed below when I get home from the hospital:    lidocaine 4% topical film  -- Apply on skin to affected area once a day MDD: Please remove the patch 12 hours after being applied    methocarbamol 500 mg oral tablet  -- 2 tab(s) by mouth every 8 hours    PT for back pain. Treat and release  -- 0

## 2024-04-15 NOTE — OB RN DELIVERY SUMMARY - BABY A SEX
Chief Complaint   Patient presents with    Chest Pain       History Of Present Illness  Abdias is a 26 year old male with ckd3, hypertension, hl presenting with low blood pressure.   Video Chinese interpretor used:  Patient reports 2 days of \"dropping blood pressure\".  But also states that since he refilled his medications 8 days ago he has been feeling unwell.  Notes associated mild chest pain, shortness of breath and tingling in his entire body.      No alleviating or exacerbating factors. No radiation.  No associated features.    ER Course  Given full dose aspirin       Past Medical History  Past Medical History:   Diagnosis Date    High cholesterol     Proteinuria         Surgical History  History reviewed. No pertinent surgical history.     Social History  Social History     Tobacco Use    Smoking status: Never    Smokeless tobacco: Never   Vaping Use    Vaping status: never used   Substance Use Topics    Alcohol use: Never    Drug use: Never       Family History  History reviewed. No pertinent family history.     Allergies  ALLERGIES:  Patient has no known allergies.    Medications  (Not in a hospital admission)      Review of Systems  12 point ROS negative except per HPI       Last Recorded Vitals  Blood pressure 114/66, pulse 70, temperature 98.6 °F (37 °C), temperature source Oral, resp. rate 16, SpO2 100%.    Physical Exam  Vitals reviewed.   HENT:      Head: Normocephalic and atraumatic.   Neck:      Trachea: No tracheal deviation.   Pulmonary:      Effort: Pulmonary effort is normal. No respiratory distress.      Breath sounds: No stridor. No wheezing or rales.   Chest:      Chest wall: No tenderness.   Abdominal:      General: There is no distension.      Palpations: Abdomen is soft.      Tenderness: There is no abdominal tenderness. There is no guarding or rebound.   Musculoskeletal:         General: No tenderness or deformity. Normal range of motion.      Cervical back: Neck supple.   Skin:      General: Skin is warm and dry.      Findings: No erythema or rash.   Neurological:      Mental Status: He is alert.   Psychiatric:         Mood and Affect: Affect normal.         Cognition and Memory: Memory normal.          Imaging  XR CHEST PA AND LATERAL 2 VIEWS    Result Date: 4/14/2024  EXAM: Chest PA and lateral 4/14/2024 CLINICAL INDICATION: Chest pain. COMPARISON: 10/21/2023. PA and lateral views of chest are submitted. FINDINGS: Cardiac silhouette and pulmonary vasculature appear within normal limits.  Lungs appear clear of pneumonic infiltrate.  No significant pleural effusion or pneumothorax is seen.  Mediastinum appears relatively stable.     No definitive acute cardiopulmonary findings.  Recommend followup as clinically warranted. Electronically Signed by: LORENZO GERMAN M.D. Signed on: 4/14/2024 10:07 PM Workstation ID: [id number]       Labs     Recent Results (from the past 24 hour(s))   Comprehensive Metabolic Panel    Collection Time: 04/14/24  9:24 PM   Result Value Ref Range    Fasting Status      Sodium 137 135 - 145 mmol/L    Potassium 3.6 3.4 - 5.1 mmol/L    Chloride 104 97 - 110 mmol/L    Carbon Dioxide 23 21 - 32 mmol/L    Anion Gap 14 7 - 19 mmol/L    Glucose 96 70 - 99 mg/dL    BUN 30 (H) 6 - 20 mg/dL    Creatinine 2.50 (H) 0.67 - 1.17 mg/dL    Glomerular Filtration Rate 36 (L) >=60    BUN/Cr 12 7 - 25    Calcium 8.5 8.4 - 10.2 mg/dL    Bilirubin, Total 0.5 0.2 - 1.0 mg/dL    GOT/AST 17 <=37 Units/L    GPT/ALT 29 <64 Units/L    Alkaline Phosphatase 60 45 - 117 Units/L    Albumin 3.6 3.6 - 5.1 g/dL    Protein, Total 6.7 6.4 - 8.2 g/dL    Globulin 3.1 2.0 - 4.0 g/dL    A/G Ratio 1.2 1.0 - 2.4   TROPONIN I, HIGH SENSITIVITY    Collection Time: 04/14/24  9:24 PM   Result Value Ref Range    Troponin I, High Sensitivity 10 <77 ng/L   CBC with Automated Differential (performable only)    Collection Time: 04/14/24  9:24 PM   Result Value Ref Range    WBC 6.6 4.2 - 11.0 K/mcL    RBC 3.46 (L) 4.50 -  5.90 mil/mcL    HGB 10.2 (L) 13.0 - 17.0 g/dL    HCT 29.7 (L) 39.0 - 51.0 %    MCV 85.8 78.0 - 100.0 fl    MCH 29.5 26.0 - 34.0 pg    MCHC 34.3 32.0 - 36.5 g/dL    RDW-CV 12.6 11.0 - 15.0 %    RDW-SD 39.2 39.0 - 50.0 fL     140 - 450 K/mcL    NRBC 0 <=0 /100 WBC    Neutrophil, Percent 46 %    Lymphocytes, Percent 39 %    Mono, Percent 9 %    Eosinophils, Percent 5 %    Basophils, Percent 1 %    Immature Granulocytes 0 %    Absolute Neutrophils 3.0 1.8 - 7.7 K/mcL    Absolute Lymphocytes 2.6 1.0 - 4.8 K/mcL    Absolute Monocytes 0.6 0.3 - 0.9 K/mcL    Absolute Eosinophils  0.3 0.0 - 0.5 K/mcL    Absolute Basophils 0.1 0.0 - 0.3 K/mcL    Absolute Immature Granulocytes 0.0 0.0 - 0.2 K/mcL   TROPONIN I, HIGH SENSITIVITY    Collection Time: 04/15/24  1:01 AM   Result Value Ref Range    Troponin I, High Sensitivity 11 <77 ng/L        Assessment & Plan  Symptomatic Hypotension  Acute kidney injury on ckd3b  Dehydration  - hold home coreg and losartan.  IV fluids. Monitor bp, orthostatic vitals.  Check echo.  Renal consulted. ACS ruled out with negative troponin x2 and nonischemic ekg.     Hypertension, HL  - hold bp meds.  Cont statin          DVT Prophylaxis    Heparin sq    Code Status    Code Status: Prior    Primary Care Physician  Pcp, No    I certify observation  status for treatment and evaluation of hypotension. Anticipated length of stay less than 2 midnights.              Male Female

## 2024-04-24 ENCOUNTER — EMERGENCY (EMERGENCY)
Facility: HOSPITAL | Age: 34
LOS: 0 days | Discharge: HOME | End: 2024-04-24
Attending: STUDENT IN AN ORGANIZED HEALTH CARE EDUCATION/TRAINING PROGRAM
Payer: MEDICAID

## 2024-04-24 VITALS
OXYGEN SATURATION: 98 % | HEART RATE: 81 BPM | WEIGHT: 158.07 LBS | RESPIRATION RATE: 18 BRPM | HEIGHT: 62 IN | DIASTOLIC BLOOD PRESSURE: 87 MMHG | SYSTOLIC BLOOD PRESSURE: 132 MMHG | TEMPERATURE: 98 F

## 2024-04-24 VITALS
TEMPERATURE: 98 F | SYSTOLIC BLOOD PRESSURE: 97 MMHG | HEART RATE: 61 BPM | DIASTOLIC BLOOD PRESSURE: 55 MMHG | OXYGEN SATURATION: 97 %

## 2024-04-24 DIAGNOSIS — N13.2 HYDRONEPHROSIS WITH RENAL AND URETERAL CALCULOUS OBSTRUCTION: ICD-10-CM

## 2024-04-24 DIAGNOSIS — R11.2 NAUSEA WITH VOMITING, UNSPECIFIED: ICD-10-CM

## 2024-04-24 DIAGNOSIS — Z98.890 OTHER SPECIFIED POSTPROCEDURAL STATES: Chronic | ICD-10-CM

## 2024-04-24 DIAGNOSIS — N39.0 URINARY TRACT INFECTION, SITE NOT SPECIFIED: ICD-10-CM

## 2024-04-24 DIAGNOSIS — J45.909 UNSPECIFIED ASTHMA, UNCOMPLICATED: ICD-10-CM

## 2024-04-24 DIAGNOSIS — R10.9 UNSPECIFIED ABDOMINAL PAIN: ICD-10-CM

## 2024-04-24 LAB
ALBUMIN SERPL ELPH-MCNC: 4.2 G/DL — SIGNIFICANT CHANGE UP (ref 3.5–5.2)
ALP SERPL-CCNC: 118 U/L — HIGH (ref 30–115)
ALT FLD-CCNC: 17 U/L — SIGNIFICANT CHANGE UP (ref 0–41)
ANION GAP SERPL CALC-SCNC: 12 MMOL/L — SIGNIFICANT CHANGE UP (ref 7–14)
APPEARANCE UR: ABNORMAL
AST SERPL-CCNC: 13 U/L — SIGNIFICANT CHANGE UP (ref 0–41)
BACTERIA # UR AUTO: ABNORMAL /HPF
BASOPHILS # BLD AUTO: 0.02 K/UL — SIGNIFICANT CHANGE UP (ref 0–0.2)
BASOPHILS NFR BLD AUTO: 0.3 % — SIGNIFICANT CHANGE UP (ref 0–1)
BILIRUB SERPL-MCNC: 0.4 MG/DL — SIGNIFICANT CHANGE UP (ref 0.2–1.2)
BILIRUB UR-MCNC: NEGATIVE — SIGNIFICANT CHANGE UP
BUN SERPL-MCNC: 16 MG/DL — SIGNIFICANT CHANGE UP (ref 10–20)
CALCIUM SERPL-MCNC: 9.4 MG/DL — SIGNIFICANT CHANGE UP (ref 8.4–10.5)
CAST: 1 /LPF — SIGNIFICANT CHANGE UP (ref 0–4)
CHLORIDE SERPL-SCNC: 104 MMOL/L — SIGNIFICANT CHANGE UP (ref 98–110)
CO2 SERPL-SCNC: 23 MMOL/L — SIGNIFICANT CHANGE UP (ref 17–32)
COLOR SPEC: YELLOW — SIGNIFICANT CHANGE UP
CREAT SERPL-MCNC: 0.6 MG/DL — LOW (ref 0.7–1.5)
DIFF PNL FLD: ABNORMAL
EGFR: 121 ML/MIN/1.73M2 — SIGNIFICANT CHANGE UP
EOSINOPHIL # BLD AUTO: 0.13 K/UL — SIGNIFICANT CHANGE UP (ref 0–0.7)
EOSINOPHIL NFR BLD AUTO: 2.2 % — SIGNIFICANT CHANGE UP (ref 0–8)
GLUCOSE SERPL-MCNC: 111 MG/DL — HIGH (ref 70–99)
GLUCOSE UR QL: NEGATIVE MG/DL — SIGNIFICANT CHANGE UP
HCG SERPL QL: NEGATIVE — SIGNIFICANT CHANGE UP
HCT VFR BLD CALC: 36.2 % — LOW (ref 37–47)
HGB BLD-MCNC: 11.5 G/DL — LOW (ref 12–16)
IMM GRANULOCYTES NFR BLD AUTO: 0.2 % — SIGNIFICANT CHANGE UP (ref 0.1–0.3)
KETONES UR-MCNC: NEGATIVE MG/DL — SIGNIFICANT CHANGE UP
LEUKOCYTE ESTERASE UR-ACNC: ABNORMAL
LIDOCAIN IGE QN: 25 U/L — SIGNIFICANT CHANGE UP (ref 7–60)
LYMPHOCYTES # BLD AUTO: 2.11 K/UL — SIGNIFICANT CHANGE UP (ref 1.2–3.4)
LYMPHOCYTES # BLD AUTO: 36.1 % — SIGNIFICANT CHANGE UP (ref 20.5–51.1)
MCHC RBC-ENTMCNC: 27.1 PG — SIGNIFICANT CHANGE UP (ref 27–31)
MCHC RBC-ENTMCNC: 31.8 G/DL — LOW (ref 32–37)
MCV RBC AUTO: 85.4 FL — SIGNIFICANT CHANGE UP (ref 81–99)
MONOCYTES # BLD AUTO: 0.41 K/UL — SIGNIFICANT CHANGE UP (ref 0.1–0.6)
MONOCYTES NFR BLD AUTO: 7 % — SIGNIFICANT CHANGE UP (ref 1.7–9.3)
NEUTROPHILS # BLD AUTO: 3.16 K/UL — SIGNIFICANT CHANGE UP (ref 1.4–6.5)
NEUTROPHILS NFR BLD AUTO: 54.2 % — SIGNIFICANT CHANGE UP (ref 42.2–75.2)
NITRITE UR-MCNC: NEGATIVE — SIGNIFICANT CHANGE UP
NRBC # BLD: 0 /100 WBCS — SIGNIFICANT CHANGE UP (ref 0–0)
PH UR: 6 — SIGNIFICANT CHANGE UP (ref 5–8)
PLATELET # BLD AUTO: 256 K/UL — SIGNIFICANT CHANGE UP (ref 130–400)
PMV BLD: 11.7 FL — HIGH (ref 7.4–10.4)
POTASSIUM SERPL-MCNC: 4.4 MMOL/L — SIGNIFICANT CHANGE UP (ref 3.5–5)
POTASSIUM SERPL-SCNC: 4.4 MMOL/L — SIGNIFICANT CHANGE UP (ref 3.5–5)
PROT SERPL-MCNC: 6.7 G/DL — SIGNIFICANT CHANGE UP (ref 6–8)
PROT UR-MCNC: SIGNIFICANT CHANGE UP MG/DL
RBC # BLD: 4.24 M/UL — SIGNIFICANT CHANGE UP (ref 4.2–5.4)
RBC # FLD: 15.3 % — HIGH (ref 11.5–14.5)
RBC CASTS # UR COMP ASSIST: 15 /HPF — HIGH (ref 0–4)
SODIUM SERPL-SCNC: 139 MMOL/L — SIGNIFICANT CHANGE UP (ref 135–146)
SP GR SPEC: 1.01 — SIGNIFICANT CHANGE UP (ref 1–1.03)
SQUAMOUS # UR AUTO: 5 /HPF — SIGNIFICANT CHANGE UP (ref 0–5)
UROBILINOGEN FLD QL: 0.2 MG/DL — SIGNIFICANT CHANGE UP (ref 0.2–1)
WBC # BLD: 5.84 K/UL — SIGNIFICANT CHANGE UP (ref 4.8–10.8)
WBC # FLD AUTO: 5.84 K/UL — SIGNIFICANT CHANGE UP (ref 4.8–10.8)
WBC UR QL: 51 /HPF — HIGH (ref 0–5)

## 2024-04-24 PROCEDURE — 99285 EMERGENCY DEPT VISIT HI MDM: CPT

## 2024-04-24 PROCEDURE — 99284 EMERGENCY DEPT VISIT MOD MDM: CPT | Mod: 25

## 2024-04-24 PROCEDURE — 74177 CT ABD & PELVIS W/CONTRAST: CPT | Mod: 26,MC

## 2024-04-24 PROCEDURE — 84703 CHORIONIC GONADOTROPIN ASSAY: CPT

## 2024-04-24 PROCEDURE — 87086 URINE CULTURE/COLONY COUNT: CPT

## 2024-04-24 PROCEDURE — 36415 COLL VENOUS BLD VENIPUNCTURE: CPT

## 2024-04-24 PROCEDURE — 85025 COMPLETE CBC W/AUTO DIFF WBC: CPT

## 2024-04-24 PROCEDURE — 96376 TX/PRO/DX INJ SAME DRUG ADON: CPT

## 2024-04-24 PROCEDURE — 81001 URINALYSIS AUTO W/SCOPE: CPT

## 2024-04-24 PROCEDURE — 96374 THER/PROPH/DIAG INJ IV PUSH: CPT | Mod: XU

## 2024-04-24 PROCEDURE — 76705 ECHO EXAM OF ABDOMEN: CPT | Mod: 26

## 2024-04-24 PROCEDURE — 80053 COMPREHEN METABOLIC PANEL: CPT

## 2024-04-24 PROCEDURE — 96375 TX/PRO/DX INJ NEW DRUG ADDON: CPT

## 2024-04-24 PROCEDURE — 76705 ECHO EXAM OF ABDOMEN: CPT

## 2024-04-24 PROCEDURE — 74177 CT ABD & PELVIS W/CONTRAST: CPT | Mod: MC

## 2024-04-24 PROCEDURE — 83690 ASSAY OF LIPASE: CPT

## 2024-04-24 RX ORDER — KETOROLAC TROMETHAMINE 30 MG/ML
1 SYRINGE (ML) INJECTION
Qty: 20 | Refills: 0
Start: 2024-04-24 | End: 2024-04-28

## 2024-04-24 RX ORDER — MORPHINE SULFATE 50 MG/1
6 CAPSULE, EXTENDED RELEASE ORAL ONCE
Refills: 0 | Status: DISCONTINUED | OUTPATIENT
Start: 2024-04-24 | End: 2024-04-24

## 2024-04-24 RX ORDER — ONDANSETRON 8 MG/1
4 TABLET, FILM COATED ORAL ONCE
Refills: 0 | Status: COMPLETED | OUTPATIENT
Start: 2024-04-24 | End: 2024-04-24

## 2024-04-24 RX ORDER — CEFPODOXIME PROXETIL 100 MG
1 TABLET ORAL
Qty: 20 | Refills: 0
Start: 2024-04-24 | End: 2024-05-03

## 2024-04-24 RX ORDER — SODIUM CHLORIDE 9 MG/ML
1000 INJECTION INTRAMUSCULAR; INTRAVENOUS; SUBCUTANEOUS ONCE
Refills: 0 | Status: COMPLETED | OUTPATIENT
Start: 2024-04-24 | End: 2024-04-24

## 2024-04-24 RX ORDER — TAMSULOSIN HYDROCHLORIDE 0.4 MG/1
1 CAPSULE ORAL
Qty: 7 | Refills: 0
Start: 2024-04-24 | End: 2024-04-30

## 2024-04-24 RX ORDER — MORPHINE SULFATE 50 MG/1
2 CAPSULE, EXTENDED RELEASE ORAL ONCE
Refills: 0 | Status: DISCONTINUED | OUTPATIENT
Start: 2024-04-24 | End: 2024-04-24

## 2024-04-24 RX ORDER — KETOROLAC TROMETHAMINE 30 MG/ML
30 SYRINGE (ML) INJECTION ONCE
Refills: 0 | Status: DISCONTINUED | OUTPATIENT
Start: 2024-04-24 | End: 2024-04-24

## 2024-04-24 RX ADMIN — SODIUM CHLORIDE 1000 MILLILITER(S): 9 INJECTION INTRAMUSCULAR; INTRAVENOUS; SUBCUTANEOUS at 06:00

## 2024-04-24 RX ADMIN — MORPHINE SULFATE 2 MILLIGRAM(S): 50 CAPSULE, EXTENDED RELEASE ORAL at 09:32

## 2024-04-24 RX ADMIN — ONDANSETRON 4 MILLIGRAM(S): 8 TABLET, FILM COATED ORAL at 06:00

## 2024-04-24 RX ADMIN — MORPHINE SULFATE 6 MILLIGRAM(S): 50 CAPSULE, EXTENDED RELEASE ORAL at 05:59

## 2024-04-24 RX ADMIN — Medication 30 MILLIGRAM(S): at 06:00

## 2024-04-24 NOTE — ED PROVIDER NOTE - SECONDARY DIAGNOSIS.
"Dilip Laws (75 y.o. Male)       Date of Birth   1948    Social Security Number       Address   [address]  [place] [address]    Home Phone   [phone]    MRN   [id number]       Alevism   None    Marital Status   Single                            Admission Date   9/11/23    Admission Type   Emergency    Admitting Provider   Cleopatra Alaniz MD    Attending Provider   Danny Méndez MD    Department, Room/Bed   [facility] 4TH FLOOR MEDICAL TELEMETRY UNIT, 426/1       Discharge Date       Discharge Disposition       Discharge Destination                                 Attending Provider: Danny Méndez MD    Allergies: No Known Allergies    Isolation: None   Infection: None   Code Status: CPR    Ht: 182.9 cm (72\")   Wt: 110 kg (243 lb 6.2 oz)    Admission Cmt: None   Principal Problem: Acute respiratory failure with hypoxia and hypercapnia [J96.01,J96.02]                   Active Insurance as of 9/11/2023       Primary Coverage       Payor Plan Insurance Group Employer/Plan Group    HUMANA MEDICARE REPLACEMENT HUMANA MEDICARE REPLACEMENT [id number]       Payor Plan Address Payor Plan Phone Number Payor Plan Fax Number Effective Dates    [address] [address] [phone]  4/1/2022 - None Entered    [facility] [address]         Subscriber Name Subscriber Birth Date Member ID       DILIP LAWS 1948 [id number]               Secondary Coverage       Payor Plan Insurance Group Employer/Plan Group    [employer] [place]LL[id number]       Payor Plan Address Payor Plan Phone Number Payor Plan Fax Number Effective Dates    [address] [phone]  5/20/2022 - None Entered    [facility] [address]         Subscriber Name Subscriber Birth Date Member ID       DILIP LAWS 1948 [id number]                     Emergency Contacts        (Rel.) Home Phone Work Phone Mobile Phone    Juana Sandoval (Sister) [phone] -- [phone]      "            History & Physical        Cleopatra Alaniz MD at 23 1534           [facility]   Patient Name: Dliip Faulkner  : 1948  MRN: [id number]  Primary Care Physician:  Mallorie Roa APRN  Date of admission: 2023    Subjective   Subjective     Chief Complaint: Shortness of breath    HPI:    Dilip Faulkner is a 75 y.o. male with a past medical history of COPD, type 2 diabetes mellitus, hypertension, hyperlipidemia presented to the ED for evaluation of shortness of breath by EMS.  Per EMS, patient noted to be saturating around 60% and has been complaining of shortness of breath since yesterday.  Patient's oxygen concentrator at home was not working.  Received nebulizer treatment, steroids and magnesium in route to hospital and was placed on BiPAP.  Denies any worsening cough, fevers, chills, runny nose, sore throat, dysuria.    In the ED, vital signs temperature 97.9, pulse 90, respiratory 30, blood pressure 140/101 on BiPAP saturating around 97% on 70% FiO2.  Initial ABG 7.1 7/66/231 on 100% BiPAP, repeat ABG 7.22/62/86 on 32% FiO2.  Normal lactic acid, normal CMP, WBC 10.97, hemoglobin 12.1, platelets 263, urinalysis does not appear to be infected.  Chest x-ray showed stable chronic appearing left mid and lower lobe reticular markings unchanged from previous chest x-ray in .  No acute abnormalities noted.  Blood cultures in process.  Patient has been admitted for further evaluation and management of acute hypoxic and hypercapnic respiratory failure likely due to COPD exacerbation.      Personal History     Past Medical History:   Diagnosis Date    Asthma     COPD (chronic obstructive pulmonary disease)     Diabetes mellitus     Ex-smoker     QUIT     Hernia, umbilical     Hypertension     On home O2     REPORTS WEARING 2L/NC PRN SOA         Past Surgical History:   Procedure Laterality Date    ABDOMINAL SURGERY            History reviewed. No pertinent family history.      Social History     Socioeconomic History    Marital status: Single   Tobacco Use    Smoking status: Former     Packs/day: 1.50     Years: 56.00     Pack years: 84.00     Types: Cigarettes     Start date:      Quit date: 3/19/2021     Years since quittin.4    Smokeless tobacco: Current     Types: Chew   Vaping Use    Vaping Use: Never used   Substance and Sexual Activity    Alcohol use: Never    Drug use: Never    Sexual activity: Defer         Home Medications:  Fluticasone-Salmeterol, albuterol sulfate HFA, aspirin, atorvastatin, empagliflozin, insulin degludec, metFORMIN, metoprolol succinate XL, and pantoprazole    Allergies:  No Known Allergies    Review of Systems   All other systems reviewed and negative except as mentioned above in the HPI    Objective   Objective     Vitals:   Temp:  [97.9 °F (36.6 °C)] 97.9 °F (36.6 °C)  Heart Rate:  [89-99] 89  Resp:  [30] 30  BP: (121-140)/() 123/71    Physical Exam    Constitutional: Awake, mild respiratory distress, on BiPAP lethargic   Eyes: Pupils equal, sclerae anicteric, no conjunctival injection   HENT: NCAT, mucous membranes moist   Respiratory: Bilateral air entry present, diffuse inspiratory expiratory wheezing, no respiratory distress   Cardiovascular: RRR, no murmurs, rubs, or gallops, palpable pedal pulses bilaterally   Gastrointestinal: Positive bowel sounds, soft, nontender, nondistended   Musculoskeletal: No bilateral ankle edema, no clubbing or cyanosis to extremities   Psychiatric: Appropriate affect, cooperative   Neurologic: Oriented x 3,speech clear   Skin: No rashes     Result Review    Result Review:  I have personally reviewed the results from the time of this admission to 2023 16:17 EDT and agree with these findings:  [x]  Laboratory  []  Microbiology  [x]  Radiology  [x]  EKG/Telemetry   []  Cardiology/Vascular   []  Pathology  []  Old records  []   Other:        Imaging Results (Last 24 Hours)       Procedure Component Value Units Date/Time    XR Chest 1 View [[id number]] Collected: 09/11/23 1335     Updated: 09/11/23 1338    Narrative:      PROCEDURE: XR CHEST 1 VW     COMPARISON: [facility], CT, CT CHEST WO CONTRAST DIAGNOSTIC, 6/07/2023, 16:27.    [facility], CR, XR CHEST 1 VW, 8/27/2023, 0:33.     INDICATIONS: SOA Triage Protocol     FINDINGS:   Heart size within normal limits and similar to prior exam.  Chronic appearing left mid and lower   lobe reticular markings unchanged from prior exam.  Pleural thickening versus trace effusion   unchanged from prior exam noted on the left.  Right lung relatively clear.  No pneumothorax.    Underlying emphysematous changes noted most notable in the upper lobes.  Osseous structures are   unremarkable.       Impression:         Stable appearing chronic findings compared to previous radiograph, without convincing new   abnormality.                  MELANIA SHAIKH MD         Electronically Signed and Approved By: MELANIA SHAIKH MD on 9/11/2023 at 13:34                                    Assessment & Plan   Assessment / Plan     Assessment/Plan:   Acute hypoxic and hypercapnic respiratory failure  Respiratory acidosis  COPD exacerbation  History of COPD on home oxygen  Type 2 diabetes mellitus  Hypertension  Hyperlipidemia    Plan  Admit to inpatient, telemetry  Continue on BiPAP tonight, recheck ABG around 8 PM  Received methylprednisolone by the EMS  Prednisone 40 mg p.o. daily for next 4 days starting tomorrow  Symbicort 2 puffs twice daily  DuoNebs every 4 hours scheduled  Bronchodilator protocol  Bronchopulmonary hygiene  Xopenex every 6 hours as needed  Pulmonology consult  N.p.o. while on BiPAP except sips with meds  Low-dose sliding scale insulin  POCT every 6 hours  Hypoglycemic protocol      DVT prophylaxis:  No DVT prophylaxis order currently exists.    CODE STATUS:    Medical  Intervention Limits: NO intubation (DNI)  Level Of Support Discussed With: Patient  Code Status (Patient has no pulse and is not breathing): CPR (Attempt to Resuscitate)  Medical Interventions (Patient has pulse or is breathing): Limited Support      Admission Status:  I believe this patient meets inpatient status.    Part of this note may be an electronic transcription/translation of spoken language to printed text using the Dragon Dictation System    Cleopatra Alaniz MD               Electronically signed by Cleopatra Alaniz MD at 09/11/23 1617          Emergency Department Notes        Dulce Ayala RN at 09/11/23 1319          SPOKE WITH PATIENT ABOUT POSSIBILITY OF BEING INTUBATED. PATIENT VERBALIZED THAT HE DOES NOT WANT TO BE INTUBATED. MD AWARE    Electronically signed by Dulce Ayala RN at 09/11/23 1319       Aaron Jacob MD at 09/11/23 1312          Time: 1:12 PM EDT  Date of encounter:  9/11/2023  Independent Historian/Clinical History and Information was obtained by:   Patient and EMS    History is limited by: Acuity of Condition    Chief Complaint: Shortness of breath      History of Present Illness:  Patient is a 75 y.o. year old male who presents to the emergency department for evaluation of shortness of breath.  Per EMS they were called out due to shortness of breath.  Patient was reported to have oxygen saturations in the 60s and has been short of breath since yesterday.  They report his oxygen concentrator was not working.  He received DuoNeb, steroids, mag in route.  They also placed patient on BiPAP.  He had urinated himself.  Patient mainly only complaining of shortness of breath.  Denies any chest pain, nausea, vomiting, fever.    HPI    Patient Care Team  Primary Care Provider: Mallorie Roa APRN    Past Medical History:     No Known Allergies  Past Medical History:   Diagnosis Date    Asthma     COPD (chronic obstructive pulmonary disease)     Diabetes mellitus     Ex-smoker      QUIT 2021    Hernia, umbilical     Hypertension     On home O2     REPORTS WEARING 2L/NC PRN SOA     Past Surgical History:   Procedure Laterality Date    ABDOMINAL SURGERY       History reviewed. No pertinent family history.    Home Medications:  Prior to Admission medications    Medication Sig Start Date End Date Taking? Authorizing Provider   albuterol sulfate  (90 Base) MCG/ACT inhaler Inhale 2 puffs Every 4 (Four) Hours As Needed for Wheezing. 8/14/23   Mallorie Roa APRN   aspirin 81 MG chewable tablet Chew 1 tablet Daily. 5/2/23   Mallorie Roa APRN   atorvastatin (LIPITOR) 10 MG tablet Take 1 tablet by mouth Daily for 180 days. 9/1/23 2/28/24  Mallorie Roa APRN   cetirizine (zyrTEC) 10 MG tablet Take 1 tablet by mouth Daily. 5/2/23   Mallorie Roa APRN   empagliflozin (JARDIANCE) 10 MG tablet tablet Take 1 tablet by mouth Daily.    Provider, MD Roosevelt   Fluticasone-Salmeterol (ADVAIR/WIXELA) 250-50 MCG/ACT DISKUS Inhale 1 puff 2 (Two) Times a Day for 180 days. 9/1/23 2/28/24  Mallorie Roa APRN   insulin degludec (Tresiba FlexTouch) 100 UNIT/ML solution pen-injector injection Inject 20 Units under the skin into the appropriate area as directed Daily. 7/27/23   Mallorie Roa APRN   lisinopril (PRINIVIL,ZESTRIL) 5 MG tablet Take 1 tablet by mouth Daily for 30 days. 5/2/23 8/27/23  Mallorie Roa APRN   metFORMIN (GLUCOPHAGE) 500 MG tablet Take 1 tablet by mouth 2 (Two) Times a Day With Meals for 30 days. 5/2/23 8/27/23  Mallorie Roa APRN   metoprolol succinate XL (TOPROL-XL) 25 MG 24 hr tablet Take 1 tablet by mouth Daily for 30 days. 5/2/23 8/27/23  Mallorie Roa APRN   pantoprazole (PROTONIX) 40 MG EC tablet Take 1 tablet by mouth Daily for 30 days. 8/30/23 9/29/23  Yung Singleton MD        Social History:   Social History     Tobacco Use    Smoking status: Former     Packs/day: 1.50     Years: 56.00     Pack years: 84.00     Types: Cigarettes     Start  "date:      Quit date: 3/19/2021     Years since quittin.4    Smokeless tobacco: Current     Types: Chew   Vaping Use    Vaping Use: Never used   Substance Use Topics    Alcohol use: Never    Drug use: Never         Review of Systems:  Review of Systems   Constitutional:  Negative for fever.   Respiratory:  Positive for shortness of breath.       Physical Exam:  /71   Pulse 89   Temp 97.9 °F (36.6 °C) (Rectal)   Resp (!) 30   Ht 182.9 cm (72\")   Wt 113 kg (250 lb)   SpO2 93%   BMI 33.91 kg/m²     Physical Exam  Vitals and nursing note reviewed.   Constitutional:       Appearance: Normal appearance. He is ill-appearing.      Comments: Patient had urinated on himself.  Patient is alert but is lethargic   HENT:      Head: Normocephalic and atraumatic.   Eyes:      General: No scleral icterus.  Cardiovascular:      Rate and Rhythm: Normal rate and regular rhythm.   Pulmonary:      Effort: Respiratory distress present.      Breath sounds: Wheezing present.   Abdominal:      Palpations: Abdomen is soft.      Tenderness: There is no abdominal tenderness.   Musculoskeletal:         General: Normal range of motion.      Cervical back: Normal range of motion.   Skin:     Findings: No rash.   Neurological:      General: No focal deficit present.      Mental Status: He is alert.                Procedures:  Procedures      Medical Decision Making:      Comorbidities that affect care:    COPD, Diabetes    External Notes reviewed:    Reviewed admission from 2023      The following orders were placed and all results were independently analyzed by me:  Orders Placed This Encounter   Procedures    Blood Culture - Blood,    Blood Culture - Blood,    XR Chest 1 View    Roberts Draw    Comprehensive Metabolic Panel    BNP    Single High Sensitivity Troponin T    CBC Auto Differential    ABG with Co-Ox and Electrolytes    Lactic Acid, Plasma    Urinalysis With Culture If Indicated - Urine, Clean Catch    Blood " Gas, Arterial -Obtain on: Current Settings; With Co-Ox Panel: Yes    Urinalysis, Microscopic Only - Urine, Clean Catch    NPO Diet NPO Type: Strict NPO    Undress & Gown    Continuous Pulse Oximetry    Vital Signs    Inpatient Hospitalist Consult    Oxygen Therapy- Nasal Cannula; Titrate 1-6 LPM Per SpO2; 90 - 95%    POC Glucose Once    ECG 12 Lead ED Triage Standing Order; SOA    Insert Peripheral IV    CBC & Differential    Green Top (Gel)    Lavender Top    Gold Top - SST    Light Blue Top       Medications Given in the Emergency Department:  Medications   sodium chloride 0.9 % flush 10 mL (has no administration in time range)        ED Course:    ED Course as of 09/11/23 1534   Mon Sep 11, 2023   1445 Updated patient on need for admission.  Has not been improving on BiPAP.  Continue BiPAP at this time.  Will admit to the hospital. [MA]   1533 Spoke with Dr. Alaniz who agrees to admit. [MA]      ED Course User Index  [MA] Aaron Jacob MD       Labs:    Lab Results (last 24 hours)       Procedure Component Value Units Date/Time    POC Glucose Once [[id number]]  (Abnormal) Collected: 09/11/23 1308    Specimen: Blood Updated: 09/11/23 1311     Glucose 159 mg/dL      Comment: Serial Number: [id number]:  [id number]       ABG with Co-Ox and Electrolytes [[id number]]  (Abnormal) Collected: 09/11/23 1309    Specimen: Arterial Blood from Arm, Right Updated: 09/11/23 1317     pH, Arterial 7.174 pH units      pCO2, Arterial 66.9 mm Hg      pO2, Arterial 231.9 mm Hg      HCO3, Arterial 24.1 mmol/L      Base Excess, Arterial -5.4 mmol/L      O2 Saturation, Arterial 98.8 %      Hemoglobin, Blood Gas 12.9 g/dL      Carboxyhemoglobin 0.9 %      Methemoglobin 0.00 %      Oxyhemoglobin 97.9 %      FHHB 1.2 %      Nael's Test Positive     Note 14/7     Site Arterial: right radial     Modality BiPap     FIO2 100 %      Flow Rate --     Sodium, Arterial 137.1 mmol/L      Potassium, Arterial 4.80 mmol/L      Ionized  Calcium, Arterial 1.14 mmol/L      Chloride, Arterial 103 mmol/L      Glucose, Arterial 157 mg/dL      Lactate, Arterial 0.92 mmol/L      PO2/FIO2 232    CBC & Differential [[id number]]  (Abnormal) Collected: 09/11/23 1310    Specimen: Blood from Arm, Left Updated: 09/11/23 1322    Narrative:      The following orders were created for panel order CBC & Differential.  Procedure                               Abnormality         Status                     ---------                               -----------         ------                     CBC Auto Differential[[id number]]        Abnormal            Final result                 Please view results for these tests on the individual orders.    Comprehensive Metabolic Panel [[id number]]  (Abnormal) Collected: 09/11/23 1310    Specimen: Blood from Arm, Left Updated: 09/11/23 1343     Glucose 160 mg/dL      BUN 15 mg/dL      Creatinine 1.00 mg/dL      Sodium 138 mmol/L      Potassium 5.0 mmol/L      Chloride 102 mmol/L      CO2 26.9 mmol/L      Calcium 8.8 mg/dL      Total Protein 7.6 g/dL      Albumin 4.1 g/dL      ALT (SGPT) 9 U/L      AST (SGOT) 13 U/L      Alkaline Phosphatase 106 U/L      Total Bilirubin 0.4 mg/dL      Globulin 3.5 gm/dL      A/G Ratio 1.2 g/dL      BUN/Creatinine Ratio 15.0     Anion Gap 9.1 mmol/L      eGFR 79.0 mL/min/1.73     Narrative:      GFR Normal >60  Chronic Kidney Disease <60  Kidney Failure <15    The GFR formula is only valid for adults with stable renal function between ages 18 and 70.    BNP [[id number]]  (Normal) Collected: 09/11/23 1310    Specimen: Blood from Arm, Left Updated: 09/11/23 1341     proBNP 446.1 pg/mL     Narrative:      Among patients with dyspnea, NT-proBNP is highly sensitive for the detection of acute congestive heart failure. In addition NT-proBNP of <300 pg/ml effectively rules out acute congestive heart failure with 99% negative predictive value.      Single High Sensitivity Troponin T [[id number]]  (Abnormal)  Collected: 09/11/23 1310    Specimen: Blood from Arm, Left Updated: 09/11/23 1343     HS Troponin T 40 ng/L     Narrative:      High Sensitive Troponin T Reference Range:  <10.0 ng/L- Negative Female for AMI  <15.0 ng/L- Negative Male for AMI  >=10 - Abnormal Female indicating possible myocardial injury.  >=15 - Abnormal Male indicating possible myocardial injury.   Clinicians would have to utilize clinical acumen, EKG, Troponin, and serial changes to determine if it is an Acute Myocardial Infarction or myocardial injury due to an underlying chronic condition.         CBC Auto Differential [[id number]]  (Abnormal) Collected: 09/11/23 1310    Specimen: Blood from Arm, Left Updated: 09/11/23 1322     WBC 10.97 10*3/mm3      RBC 5.35 10*6/mm3      Hemoglobin 12.1 g/dL      Hematocrit 43.4 %      MCV 81.1 fL      MCH 22.6 pg      MCHC 27.9 g/dL      RDW 17.6 %      RDW-SD 50.7 fl      MPV 9.9 fL      Platelets 263 10*3/mm3      Neutrophil % 63.7 %      Lymphocyte % 14.0 %      Monocyte % 8.2 %      Eosinophil % 13.0 %      Basophil % 0.6 %      Immature Grans % 0.5 %      Neutrophils, Absolute 6.97 10*3/mm3      Lymphocytes, Absolute 1.54 10*3/mm3      Monocytes, Absolute 0.90 10*3/mm3      Eosinophils, Absolute 1.43 10*3/mm3      Basophils, Absolute 0.07 10*3/mm3      Immature Grans, Absolute 0.06 10*3/mm3      nRBC 0.0 /100 WBC     Blood Culture - Blood, Arm, Right [[id number]] Collected: 09/11/23 1315    Specimen: Blood from Arm, Right Updated: 09/11/23 1318    Blood Culture - Blood, Arm, Left [[id number]] Collected: 09/11/23 1315    Specimen: Blood from Arm, Left Updated: 09/11/23 1318    Lactic Acid, Plasma [[id number]]  (Normal) Collected: 09/11/23 1315    Specimen: Blood Updated: 09/11/23 1341     Lactate 1.0 mmol/L     Blood Gas, Arterial -Obtain on: Current Settings; With Co-Ox Panel: Yes [[id number]]  (Abnormal) Collected: 09/11/23 1423    Specimen: Arterial Blood from Arm, Left Updated: 09/11/23 1428     pH,  Arterial 7.225 pH units      pCO2, Arterial 62.5 mm Hg      pO2, Arterial 86.1 mm Hg      HCO3, Arterial 25.3 mmol/L      Base Excess, Arterial -3.3 mmol/L      O2 Saturation, Arterial 94.4 %      Hemoglobin, Blood Gas 12.5 g/dL      Carboxyhemoglobin 1.2 %      Methemoglobin 0.10 %      Oxyhemoglobin 93.2 %      FHHB 5.5 %      Site Arterial: left radial     Modality BiPap     FIO2 32 %      PO2/FIO2 269     Nael's Test Positive     Note 14/7     Lactate, Arterial --    Urinalysis With Culture If Indicated - Straight Cath [[id number]]  (Abnormal) Collected: 09/11/23 1434    Specimen: Urine from Straight Cath Updated: 09/11/23 1502     Color, UA Yellow     Appearance, UA Clear     pH, UA 5.5     Specific Gravity, UA 1.023     Glucose, UA >=1000 mg/dL (3+)     Ketones, UA Negative     Bilirubin, UA Negative     Blood, UA Trace     Protein, UA Trace     Leuk Esterase, UA Negative     Nitrite, UA Negative     Urobilinogen, UA 1.0 E.U./dL    Narrative:      In absence of clinical symptoms, the presence of pyuria, bacteria, and/or nitrites on the urinalysis result does not correlate with infection.    Urinalysis, Microscopic Only - Straight Cath [[id number]]  (Abnormal) Collected: 09/11/23 1434    Specimen: Urine from Straight Cath Updated: 09/11/23 1502     RBC, UA 13-20 /HPF      WBC, UA 0-2 /HPF      Comment: Urine culture not indicated.        Bacteria, UA None Seen /HPF      Squamous Epithelial Cells, UA 0-2 /HPF      Hyaline Casts, UA 3-6 /LPF      Methodology Automated Microscopy             Imaging:    XR Chest 1 View    Result Date: 9/11/2023  PROCEDURE: XR CHEST 1 VW  COMPARISON: [facility], CT, CT CHEST WO CONTRAST DIAGNOSTIC, 6/07/2023, 16:27.  [facility], CR, XR CHEST 1 VW, 8/27/2023, 0:33.  INDICATIONS: SOA Triage Protocol  FINDINGS:  Heart size within normal limits and similar to prior exam.  Chronic appearing left mid and lower lobe reticular markings unchanged from prior exam.   Pleural thickening versus trace effusion unchanged from prior exam noted on the left.  Right lung relatively clear.  No pneumothorax.  Underlying emphysematous changes noted most notable in the upper lobes.  Osseous structures are unremarkable.        Stable appearing chronic findings compared to previous radiograph, without convincing new abnormality.       MELANIA SHAIKH MD       Electronically Signed and Approved By: MELANIA SHAIKH MD on 9/11/2023 at 13:34                Differential Diagnosis and Discussion:    Dyspnea: Differential diagnosis includes but is not limited to metabolic acidosis, neurological disorders, psychogenic, asthma, pneumothorax, upper airway obstruction, COPD, pneumonia, noncardiogenic pulmonary edema, interstitial lung disease, anemia, congestive heart failure, and pulmonary embolism    All labs were reviewed and interpreted by me.  All X-rays impressions were independently interpreted by me.  EKG was interpreted by me.    MDM     Patient with acute on chronic respiratory failure with hypoxia and hypercapnia.  Had saturations in the 70s and had to be placed on BiPAP in route.  BiPAP.  Was continued.  Has had improvement of his acid-base status and improvement in his mental status.  Continue BiPAP at this time.  Patient did confirm he did not want to be intubated if need be.  Currently does not need to be intubated and needs to be continued on BiPAP.  Will admit to the hospital further work-up and management.      Critical Care Note: Total Critical Care time of 33 minutes. Total critical care time documented does not include time spent on separately billed procedures for services of nurses or physician assistants. I personally saw and examined the patient. I have reviewed all diagnostic interpretations and treatment plans as written. I was present for the key portions of any procedures performed and the inclusive time noted in any critical care statement. Critical care time includes  patient management by me, time spent at the patients bedside,  time to review lab and imaging results, discussing patient care, documentation in the medical record, and time spent with family or caregiver.    Patient Care Considerations:          Consultants/Shared Management Plan:    Hospitalist: I have discussed the case with Dr. Alaniz who agrees to accept the patient for admission.    Social Determinants of Health:          Disposition and Care Coordination:    Admit:   Through independent evaluation of the patient's history, physical, and imperical data, the patient meets criteria for observation/admission to the hospital.        Final diagnoses:   Acute respiratory failure with hypoxia and hypercapnia        ED Disposition       ED Disposition   Decision to Admit    Condition   --    Comment   --               This medical record created using voice recognition software.             Aaron Jacob MD  09/11/23 1534      Electronically signed by Aaron Jacob MD at 09/11/23 1534       Dulce Ayala, RN at 09/11/23 1312          EMS GAVE DUO NEB IN ROUTE    Electronically signed by Dulce Ayala, RN at 09/11/23 1312       Dulce Ayala, RN at 09/11/23 1302          EMS GAVE PATIENT SOLUMEDROL 125MG IV AND MAG 2GM IV    Electronically signed by Dulce Ayala, RN at 09/11/23 1305       Facility-Administered Medications as of 9/12/2023   Medication Dose Route Frequency Provider Last Rate Last Admin    acetaminophen (TYLENOL) tablet 650 mg  650 mg Oral Q6H PRN Danny Méndez MD        aluminum-magnesium hydroxide-simethicone (MAALOX MAX) 400-400-40 MG/5ML suspension 15 mL  15 mL Oral Q6H PRN Danny Méndez MD        arformoterol (BROVANA) nebulizer solution 15 mcg  15 mcg Nebulization BID - RT Kenia Andujar APRN   15 mcg at 09/12/23 0744    AZITHROMYCIN 500 MG/250 ML 0.9% NS IVPB (vial-mate)  500 mg Intravenous Q24H Kenia Andujar APRN   500 mg at 09/11/23 2117    sennosides-docusate  (PERICOLACE) 8.6-50 MG per tablet 2 tablet  2 tablet Oral BID Cleopatra Alaniz MD        And    polyethylene glycol (MIRALAX) packet 17 g  17 g Oral Daily PRN Cleopatra Alaniz MD        And    bisacodyl (DULCOLAX) EC tablet 5 mg  5 mg Oral Daily PRN Cleopatra Alaniz MD        And    bisacodyl (DULCOLAX) suppository 10 mg  10 mg Rectal Daily PRN Cleopatra Alaniz MD        budesonide (PULMICORT) nebulizer solution 0.5 mg  0.5 mg Nebulization BID - RT Kenia Andujar, APRN   0.5 mg at 09/12/23 0744    dextrose (D50W) (25 g/50 mL) IV injection 25 g  25 g Intravenous Q15 Min PRN Cleopatra Alaniz MD        dextrose (GLUTOSE) oral gel 15 g  15 g Oral Q15 Min PRN Cleopatra Alaniz MD        Diclofenac Sodium (VOLTAREN) 1 % gel 2 g  2 g Topical 4x Daily PRN Danny Méndez MD        glucagon (GLUCAGEN) injection 1 mg  1 mg Intramuscular Q15 Min PRN Cleopatra Alaniz MD        heparin (porcine) 5000 UNIT/ML injection 5,000 Units  5,000 Units Subcutaneous Q8H Cleopatra Alaniz MD   5,000 Units at 09/12/23 0527    hydrOXYzine (ATARAX) tablet 25 mg  25 mg Oral TID PRN Danny Méndez MD        insulin regular (humuLIN R,novoLIN R) injection 2-7 Units  2-7 Units Subcutaneous 4x Daily AC & at Bedtime Danny Méndez MD        ipratropium-albuterol (DUO-NEB) nebulizer solution 3 mL  3 mL Nebulization Q4H - RT Cleopatra Alaniz MD   3 mL at 09/12/23 0744    levalbuterol (XOPENEX) nebulizer solution 1.25 mg  1.25 mg Nebulization Q6H PRN Cleopatra Alaniz MD        melatonin tablet 5 mg  5 mg Oral Nightly PRN Danny Méndez MD        methylPREDNISolone sodium succinate (SOLU-Medrol) injection 40 mg  40 mg Intravenous Q8H Kenia Andujar, MONE   40 mg at 09/12/23 0527    nicotine (NICODERM CQ) 21 MG/24HR patch 1 patch  1 patch Transdermal Daily PRN Danny Méndez MD        nitroglycerin (NITROSTAT) SL tablet 0.4 mg  0.4 mg Sublingual Q5 Min PRN Cleopatra Alaniz MD        ondansetron (ZOFRAN)  injection 4 mg  4 mg Intravenous Q6H PRN Danny Méndez MD        sodium chloride 0.9 % flush 10 mL  10 mL Intravenous PRN Cleopatra Alaniz MD        sodium chloride 0.9 % flush 10 mL  10 mL Intravenous Q12H Cleopatra Alaniz MD   10 mL at 09/11/23 2117    sodium chloride 0.9 % flush 10 mL  10 mL Intravenous PRN Cleopatra Alaniz MD        sodium chloride 0.9 % infusion 40 mL  40 mL Intravenous PRN Cleopatra Alaniz MD         Orders (active)        Start     Ordered    09/13/23 0600  Basic Metabolic Panel  Daily       09/12/23 0749    09/13/23 0600  CBC & Differential  Daily       09/12/23 0749    09/13/23 0600  Magnesium  Daily       09/12/23 0749    09/13/23 0600  Phosphorus  Daily       09/12/23 0749    09/12/23 1130  insulin regular (humuLIN R,novoLIN R) injection 2-7 Units  4 Times Daily Before Meals & Nightly         09/12/23 0747    09/12/23 1100  POC Glucose 4x Daily Before Meals & at Bedtime  4 Times Daily Before Meals & at Bedtime      Comments: Complete no more than 45 minutes prior to patient eating      09/12/23 0747    09/12/23 0800  Oral Care  2 Times Daily       09/11/23 1815    09/12/23 0750  OT Consult: Eval & Treat  Once         09/12/23 0749    09/12/23 0749  RT to Initiate Bronchodilator Protocol  Once         09/12/23 0749    09/12/23 0748  ondansetron (ZOFRAN) injection 4 mg  Every 6 Hours PRN         09/12/23 0749    09/12/23 0748  acetaminophen (TYLENOL) tablet 650 mg  Every 6 Hours PRN         09/12/23 0749    09/12/23 0748  Diclofenac Sodium (VOLTAREN) 1 % gel 2 g  4 Times Daily PRN         09/12/23 0749    09/12/23 0748  melatonin tablet 5 mg  Nightly PRN         09/12/23 0749    09/12/23 0748  aluminum-magnesium hydroxide-simethicone (MAALOX MAX) 400-400-40 MG/5ML suspension 15 mL  Every 6 Hours PRN         09/12/23 0749    09/12/23 0748  nicotine (NICODERM CQ) 21 MG/24HR patch 1 patch  Daily PRN         09/12/23 0749    09/12/23 0748  hydrOXYzine (ATARAX) tablet 25 mg  3  Times Daily PRN         09/12/23 0749    09/12/23 0748  Diet: Regular/House Diet; Texture: Regular Texture (IDDSI 7); Fluid Consistency: Thin (IDDSI 0)  Diet Effective Now         09/12/23 0747    09/12/23 0600  methylPREDNISolone sodium succinate (SOLU-Medrol) injection 40 mg  Every 8 Hours         09/11/23 1819 09/11/23 2305  Apply External Urinary Catheter  Once         09/11/23 2304 09/11/23 2200  heparin (porcine) 5000 UNIT/ML injection 5,000 Units  Every 8 Hours Scheduled         09/11/23 1815 09/11/23 2100  sodium chloride 0.9 % flush 10 mL  Every 12 Hours Scheduled         09/11/23 1815 09/11/23 2100  sennosides-docusate (PERICOLACE) 8.6-50 MG per tablet 2 tablet  2 Times Daily        See Hyperspace for full Linked Orders Report.    09/11/23 1815 09/11/23 2000  Vital Signs  Every 4 Hours       09/11/23 1815 09/11/23 1930  ipratropium-albuterol (DUO-NEB) nebulizer solution 3 mL  Every 4 Hours - RT         09/11/23 1815 09/11/23 1930  arformoterol (BROVANA) nebulizer solution 15 mcg  2 Times Daily - RT         09/11/23 1819 09/11/23 1930  budesonide (PULMICORT) nebulizer solution 0.5 mg  2 Times Daily - RT         09/11/23 1819 09/11/23 1843  PT Consult: Eval & Treat Functional Mobility Below Baseline  Once         09/11/23 1843    09/11/23 1835  Inpatient RT Case Management Consult  Once        Provider:  (Not yet assigned)    09/11/23 1835 09/11/23 1834  NIPPV - Provider Settings  Until Discontinued         09/11/23 1833    09/11/23 1816  Intake & Output  Every Shift       09/11/23 1815 09/11/23 1816  Weigh Patient  Once         09/11/23 1815 09/11/23 1816  Insert Peripheral IV  Once         09/11/23 1815 09/11/23 1816  Continuous Cardiac Monitoring  Continuous        Comments: Follow Standing Orders As Outlined in Process Instructions (Open Order Report to View Full Instructions)    09/11/23 1815 09/11/23 1816  Telemetry - Maintain IV Access  Continuous          09/11/23 1815    09/11/23 1816  Telemetry - Place Orders & Notify Provider of Results When Patient Experiences Acute Chest Pain, Dysrhythmia or Respiratory Distress  Until Discontinued         09/11/23 1815    09/11/23 1816  Continuous Pulse Oximetry  Continuous         09/11/23 1815    09/11/23 1816  RT to Initiate Bronchopulmonary Hygiene Protocol  Once         09/11/23 1815    09/11/23 1815  polyethylene glycol (MIRALAX) packet 17 g  Daily PRN        See Hyperspace for full Linked Orders Report.    09/11/23 1815    09/11/23 1815  bisacodyl (DULCOLAX) EC tablet 5 mg  Daily PRN        See Hyperspace for full Linked Orders Report.    09/11/23 1815 09/11/23 1815  bisacodyl (DULCOLAX) suppository 10 mg  Daily PRN        See Hyperspace for full Linked Orders Report.    09/11/23 1815    09/11/23 1815  levalbuterol (XOPENEX) nebulizer solution 1.25 mg  Every 6 Hours PRN         09/11/23 1815    09/11/23 1815  dextrose (GLUTOSE) oral gel 15 g  Every 15 Minutes PRN         09/11/23 1815    09/11/23 1815  dextrose (D50W) (25 g/50 mL) IV injection 25 g  Every 15 Minutes PRN         09/11/23 1815    09/11/23 1815  glucagon (GLUCAGEN) injection 1 mg  Every 15 Minutes PRN         09/11/23 1815    09/11/23 1815  sodium chloride 0.9 % infusion 40 mL  As Needed         09/11/23 1815    09/11/23 1815  nitroglycerin (NITROSTAT) SL tablet 0.4 mg  Every 5 Minutes PRN         09/11/23 1815    09/11/23 1815  sodium chloride 0.9 % flush 10 mL  As Needed         09/11/23 1815    09/11/23 1815  AZITHROMYCIN 500 MG/250 ML 0.9% NS IVPB (vial-mate)  Every 24 Hours         09/11/23 1754    09/11/23 1615  Code Status and Medical Interventions:  Continuous         09/11/23 1616    09/11/23 1433  Inpatient Hospitalist Consult  Once        Specialty:  Hospitalist  Provider:  Cleopatra Alaniz MD    09/11/23 1432    09/11/23 1319  Blood Culture - Blood, Arm, Right  Once        See Hyperspace for full Linked Orders Report.    09/11/23 5910     23 1313  Blood Culture - Blood, Arm, Left  Once        Comments: From a different site than #1.     [facility] Orders Report.    23 1313    23 1306  Insert Peripheral IV  Once         23 1306    23 1305  sodium chloride 0.9 % flush 10 mL  As Needed         23 1306    Unscheduled  Oxygen Therapy- Nasal Cannula; Titrate 1-6 LPM Per SpO2; 90 - 95%  Continuous PRN       23 1306    Unscheduled  Follow Hypoglycemia Standing Orders For Blood Glucose <70 & Notify Provider of Treatment  As Needed      Comments: Follow Hypoglycemia Orders As Outlined in Process Instructions (Open Order Report to View Full Instructions)  Notify Provider Any Time Hypoglycemia Treatment is Administered    23 1815                         Consult Notes (last 24 hours)        Leila Singh MD at 23 1732        Consult Orders    1. Inpatient Pulmonology Consult [[id number]] ordered by Cleopatra Alaniz MD at 23 1535                 Pulmonary / Critical Care Consult Note      Patient Name: Dilip Faulkner  : 1948  MRN: [id number]  Primary Care Physician:  Mallorie Roa APRN  Referring Physician: No ref. provider found  Date of admission: 2023    Subjective   Subjective     Reason for Consult/ Chief Complaint: Acute on chronic hypoxic and hypercapnic respiratory failure, COPD exacerbation    HPI:  Dilip Faulkner is a 75 y.o. male with past medical history of COPD on 2 L baseline, type 2 diabetes, hypertension who presented to the emergency department via EMS for evaluation of shortness of breath.  Per EMS, patient was found to have SPO2 around 60% on room air.  His home concentrator stopped working.  Patient received nebulizer treatment, steroids and magnesium and to hospital.  Patient was immediately placed on BiPAP.  In the emergency department, patient was found to be 97% on 70% FiO2 on BiPAP.  Initial ABG was 7.17/66/231, repeat ABG 7.22/62/86 on  30% FiO2.  Labs of note include normal lactic acid, WBC 10.97, hemoglobin 12.1, and proBNP 446.  CXR revealed stable chronic appearing left mid and lower lobe reticular markings unchanged from previous chest x-ray.  No acute abnormalities were noted.  Patient was admitted to hospitalist service for acute hypoxic and hypercapnic respiratory failure secondary to COPD exacerbation.  Pulmonology was then consulted for the above reason.  Upon exam, patient was noted to be resting comfortably in bed on BiPAP 14/7 with 32% FiO2 with SPO2 of 95%.  Patient advised he normally wears 2 L O2 at home home.  He denies any worsening cough, fever, chills.  No known sick contact exposure.    Review of Systems:  General: Denied complaints  HEENT: Denied complaints  Respiratory: Dyspnea, cough, otherwise denied complaints  Cardiovascular: Denied complaints  GI: Denied complaints  : Denied complaints  MSK: Denied complaints  Endocrine: Denied complaints  Hematologic: Denied complaints  Psychiatric: Denied complaints  Neurologic: Denied complaints  Skin: Denied complaints    Personal History     Past Medical History:   Diagnosis Date    Asthma     COPD (chronic obstructive pulmonary disease)     Diabetes mellitus     Ex-smoker     QUIT 2021    Hernia, umbilical     Hypertension     On home O2     REPORTS WEARING 2L/NC PRN SOA       Past Surgical History:   Procedure Laterality Date    ABDOMINAL SURGERY         Family History: family history is not on file. Otherwise pertinent FHx was reviewed and not pertinent to current issue.    Social History:  reports that he quit smoking about 2 years ago. His smoking use included cigarettes. He started smoking about 58 years ago. He has a 84.00 pack-year smoking history. His smokeless tobacco use includes chew. He reports that he does not drink alcohol and does not use drugs.    Home Medications:  Fluticasone-Salmeterol, albuterol sulfate HFA, aspirin, atorvastatin, empagliflozin, insulin  degludec, metFORMIN, metoprolol succinate XL, and pantoprazole    Allergies:  No Known Allergies    Objective    Objective     Vitals:   Temp:  [97.9 °F (36.6 °C)] 97.9 °F (36.6 °C)  Heart Rate:  [85-99] 85  Resp:  [30] 30  BP: (121-140)/() 132/71    Physical Exam:  Vital Signs Reviewed   General: Chronically ill-appearing male, lying in bed, NAD.    HEENT:  PERRL, EOMI.    Neck:  No JVD, no thyromegaly  Lymph: no axillary, cervical, supraclavicular lymphadenopathy noted bilaterally  Chest: Expiratory wheezing, diminished to auscultation bilaterally, no work of breathing noted on BiPAP 16/6  CV: RRR, no M/G/R, pulses 2+  Abd:  Soft, NT, ND, +BS  EXT:  no clubbing, no cyanosis, no edema  Neuro: Drowsy but answers questions appropriately, CN grossly intact, no focal deficits.  Skin: No rashes or lesions noted    Result Review    Result Review:  I have personally reviewed the results from the time of this admission to 9/11/2023 17:32 EDT and agree with these findings:  [x]  Laboratory  [x]  Microbiology  [x]  Radiology  [x]  EKG/Telemetry   [x]  Cardiology/Vascular   []  Pathology  [x]  Old records  []  Other:  Most notable findings include:     ABG 7.17/66/231 --> 7.22/62/86        Lab 09/11/23  1310 09/11/23  1309   WBC 10.97*  --    HEMOGLOBIN 12.1*  --    HEMATOCRIT 43.4  --    PLATELETS 263  --    SODIUM 138  --    SODIUM, ARTERIAL  --  137.1   POTASSIUM 5.0  --    CHLORIDE 102  --    CO2 26.9  --    BUN 15  --    CREATININE 1.00  --    GLUCOSE 160*  --    GLUCOSE, ARTERIAL  --  157*   CALCIUM 8.8  --    TOTAL PROTEIN 7.6  --    ALBUMIN 4.1  --    GLOBULIN 3.5  --          Assessment & Plan   Assessment / Plan     Active Hospital Problems:  Active Hospital Problems    Diagnosis     **Acute respiratory failure with hypoxia and hypercapnia      Impression:   Acute on chronic hypoxic and hypercapnic respiratory failure requiring NIPPV  Acute exacerbation of COPD  Respiratory acidosis  Hypertension  Type 2  diabetes mellitus    Plan:   -Currently on continuous NIPPV.  Settings adjusted to 16/6 32% FiO2.  -Recheck ABG around 8 PM  -RT  consulted for assistance with evaluating for home NIPPV  -Start azithromycin 500 mg IV x3 days for COPD exacerbation  -Sputum culture ordered  -Respiratory viral panel negative  -Discontinue Symbicort, start Brovana and Pulmicort, continue DuoNebs  -Start bronchopulmonary hygiene  -Encourage use of I-S and flutter valve when able  -Discontinue prednisone, start Solu-Medrol 40 mg IV every 8 in AM  -Encourage mobilization when able    Electronically signed by MONE Voss, 09/11/23, 5:32 PM EDT.  This patient was seen by both a physician and a NP. ILeila MD, spent >50% of time in accordance with split shared billing. This included personally reviewing all pertinent labs, imaging, microbiology and documentation. Also discussing the case with the patient and any available family, the admitting physician and any available ancillary staff. Electronically signed by Leila Singh MD, 09/11/23, 6:38 PM EDT.    Electronically signed by Leila Singh MD at 09/11/23 9335       Zaira Salcido at 09/11/23 1312          Pt brought to Trauma 2 via EMS.  has notified [facility] listed.  Pt has an EMS DNR and also a living Will stating that he does NOT want aggressive treatment.   double checked with [facility] and health care surrogate about the pt's ishes. Pt does not want to be placed on vent support and wants to be a DNR.     -  revChadwick Meneses      Electronically signed by Zaira Salcido at 09/11/23 8086        Acute UTI

## 2024-04-24 NOTE — ED ADULT NURSE NOTE - NSFALLUNIVINTERV_ED_ALL_ED
Bed/Stretcher in lowest position, wheels locked, appropriate side rails in place/Call bell, personal items and telephone in reach/Instruct patient to call for assistance before getting out of bed/chair/stretcher/Non-slip footwear applied when patient is off stretcher/[place] to call system/Physically safe environment - no spills, clutter or unnecessary equipment/Purposeful proactive rounding/Room/bathroom lighting operational, light cord in reach

## 2024-04-24 NOTE — ED PROVIDER NOTE - CLINICAL SUMMARY MEDICAL DECISION MAKING FREE TEXT BOX
34-year-old female PMH of asthma and -0-1-2 with recent normal spontaneous vaginal delivery 20 days ago, presented for sudden onset right sided flank pain around 3 am with . Labs were ordered and reviewed.  Imaging was ordered and reviewed by me.  Appropriate medications for patient's presenting complaints were ordered and effects were reassessed.  Additional history was obtained from . Patient with .3x.2 UVJ stone with perinephric stranding. Urology consulted. Medications sent to pharmacy.  Escalation to admission/observation was considered. Patient would like to try and pass stone outpatient.  Given return precautions and follow up outpatient. Patient comfortable with plan.

## 2024-04-24 NOTE — ED PROVIDER NOTE - CARE PROVIDER_API CALL
Estuardo Montenegro  Urology  [address] [place] [id number]  Phone: [phone]  Fax: [phone]  Follow Up Time: 1-3 Days

## 2024-04-24 NOTE — CONSULT NOTE ADULT - SUBJECTIVE AND OBJECTIVE BOX
Pt is a 35yo Female with PMH including asthma recent vaginal delivery 20d ago who presents to the hospital with right flank pain. Patient states that she developed severe right flank pain this morning, beginning approximately 4am. Described pain as sharp and constant, without radiation. States that she had associated nausea & NBNB vomiting x 1 prior to arrival. Denies similar previous episodes or history of nephrolithiasis. Denies fever, chills, chest pain, SOB, urinary sxs.      c/s for mild R hydro 2/2 a [id number] UVJ stone w/ reported intractable pain. Pt received toradol & morphine in the ED, 1L IVFs. At the time of eval, reports pain to be controlled at this time.     PAST MEDICAL & SURGICAL HISTORY:  No pertinent past medical history  Asthma  No significant past surgical history  H/O hand surgery - ganglion cyst    Allergies  No Known Allergies    SOCIAL HISTORY: No illicit drug use    FAMILY HISTORY:  No pertinent family history in first degree relatives    REVIEW OF SYSTEMS   [x] A ten-point review of systems was otherwise negative except as noted.    Vital Signs Last 24 Hrs  T(C): 36.7 (2024 09:16), Max: 36.8 (2024 05:17)  T(F): 98.1 (2024 09:16), Max: 98.2 (2024 05:17)  HR: 61 (2024 09:16) (61 - 81)  BP: 97/55 (2024 09:16) (97/55 - 132/87)  BP(mean): 73 (2024 09:16) (73 - 73)  RR: 18 (2024 05:17) (18 - 18)  SpO2: 97% (2024 09:16) (97% - 98%)    Parameters below as of 2024 09:16  Patient On (Oxygen Delivery Method): room air    PHYSICAL EXAM:  GEN: NAD, awake and alert. Nontoxic appearing.  SKIN: Nondiaphoretic.  RESP: Non-labored breathing. No use of accessory muscles.  CARDIO: +S1/S2  ABDO: Soft, nondistended, nontender to palpation. No palpable bladder, no suprapubic tenderness.  BACK: No CVAT B/L. Mildline lower back ttp.  : Voiding freely.  EXT: FLANNERY x 4    LABS:             11.5   5.84  )-----------( 256      ( 2024 06:10 )             36.2         139  |  104  |  16  ----------------------------<  111<H>  4.4   |  23  |  0.6<L>    Ca    9.4      2024 06:10    TPro  6.7  /  Alb  4.2  /  TBili  0.4  /  DBili  x   /  AST  13  /  ALT  17  /  AlkPhos  118<H>        Urinalysis Basic - ( 2024 08:38 )    Color: Yellow / Appearance: Cloudy / S.011 / pH: x  Gluc: x / Ketone: Negative mg/dL  / Bili: Negative / Urobili: 0.2 mg/dL   Blood: x / Protein: Trace mg/dL / Nitrite: Negative   Leuk Esterase: Moderate / RBC: 15 /HPF / WBC 51 /HPF   Sq Epi: x / Non Sq Epi: 5 /HPF / Bacteria: Few /HPF            RADIOLOGY & ADDITIONAL STUDIES:

## 2024-04-24 NOTE — ED PROVIDER NOTE - CARE PLAN
Principal Discharge DX:	Abdominal pain   1 Principal Discharge DX:	Kidney stone  Secondary Diagnosis:	Acute UTI

## 2024-04-24 NOTE — ED PROVIDER NOTE - PHYSICAL EXAMINATION
VITAL SIGNS: I have reviewed nursing notes and confirm.  CONSTITUTIONAL: Well-developed; well-nourished; in moderate acute distress as she writhing in bed in pain cw colicky type pain.  SKIN: Skin exam is warm and dry, no acute rash.  HEAD: Normocephalic; atraumatic.  EYES: PERRL, EOM intact; conjunctiva and sclera clear.  ENT: No nasal discharge; airway clear.   NECK: Supple; non tender.  CARD: S1, S2 normal; no murmurs, gallops, or rubs. Regular rate and rhythm.  RESP: No wheezes, rales or rhonchi.  ABD: Normal bowel sounds; soft; non-distended; +RUQ tenderness, but also mild RLQ tenderness,  no hepatosplenomegaly.  EXT: Normal ROM. No clubbing, cyanosis or edema.  LYMPH: No acute cervical adenopathy.  NEURO: Alert, oriented. Grossly unremarkable. No focal deficits.  PSYCH: Cooperative, appropriate.

## 2024-04-24 NOTE — ED PROVIDER NOTE - PATIENT PORTAL LINK FT
You can access the FollowMyHealth Patient Portal offered by [facility] by registering at the following website: [email or web address]. By joining [facility]’s FollowMyHealth portal, you will also be able to view your health information using other applications (apps) compatible with our system.

## 2024-04-24 NOTE — ED PROVIDER NOTE - OBJECTIVE STATEMENT
34-year-old female PMH of asthma and -0-1-2 with recent normal spontaneous vaginal delivery 20 days ago, no complications, in her usual state of health when she developed severe right sided abdominal discomfort since 330 this morning.  Patient here with her  who states that she has had severe nausea and vomited 1 time prior to arrival, but denied any history of diarrhea/fever/chills/dysuria/chest pain/shortness of breath/rash/trauma/significant increase in lochia.  Patient arrived in the ED in moderate distress that she is appearing very colicky in nature with her pain.  States pain is sharp, constant, located to the right upper quadrant and right lower quadrant.     ALL: nkda  SH no smoking or etoh  Meds: tylenol, breo, albuterol 34-year-old female PMH of asthma and -0-1-2 with recent normal spontaneous vaginal delivery 20 days ago, no complications, in her usual state of health when she developed severe right sided abdominal discomfort since 330 this morning.  Patient here with her  who states that she has had severe nausea and vomited 1 time prior to arrival, but denied any history of diarrhea/fever/chills/dysuria/chest pain/shortness of breath/rash/trauma/significant increase in lochia.  Patient arrived in the ED in moderate distress  and she is appearing very colicky in nature with her pain.  States pain is sharp, constant, located to the right upper quadrant and right lower quadrant. No radiation to groin or left side of abdomen/chest    ALL: nkda  SH no smoking or etoh  Meds: tylenol, breo, albuterol

## 2024-04-26 LAB
CULTURE RESULTS: SIGNIFICANT CHANGE UP
SPECIMEN SOURCE: SIGNIFICANT CHANGE UP

## 2024-04-30 ENCOUNTER — APPOINTMENT (OUTPATIENT)
Dept: UROLOGY | Facility: CLINIC | Age: 34
End: 2024-04-30
Payer: MEDICAID

## 2024-04-30 VITALS
HEIGHT: 64 IN | WEIGHT: 185 LBS | SYSTOLIC BLOOD PRESSURE: 113 MMHG | BODY MASS INDEX: 31.58 KG/M2 | DIASTOLIC BLOOD PRESSURE: 75 MMHG

## 2024-04-30 DIAGNOSIS — N13.30 UNSPECIFIED HYDRONEPHROSIS: ICD-10-CM

## 2024-04-30 DIAGNOSIS — N20.1 CALCULUS OF URETER: ICD-10-CM

## 2024-04-30 DIAGNOSIS — N23 UNSPECIFIED RENAL COLIC: ICD-10-CM

## 2024-04-30 PROCEDURE — 99203 OFFICE O/P NEW LOW 30 MIN: CPT

## 2024-05-02 NOTE — ED ADULT TRIAGE NOTE - MODE OF ARRIVAL
Patient has a referral to see Pulmonary. Verbally per Noah would like to see the Patient. I called and left a voicemail message for the Patient to contact the office back.  
Walk in

## 2024-05-13 ENCOUNTER — RESULT REVIEW (OUTPATIENT)
Age: 34
End: 2024-05-13

## 2024-05-13 ENCOUNTER — OUTPATIENT (OUTPATIENT)
Dept: OUTPATIENT SERVICES | Facility: HOSPITAL | Age: 34
LOS: 1 days | End: 2024-05-13
Payer: MEDICAID

## 2024-05-13 DIAGNOSIS — N20.1 CALCULUS OF URETER: ICD-10-CM

## 2024-05-13 DIAGNOSIS — Z00.8 ENCOUNTER FOR OTHER GENERAL EXAMINATION: ICD-10-CM

## 2024-05-13 PROCEDURE — 76775 US EXAM ABDO BACK WALL LIM: CPT | Mod: 26

## 2024-05-13 PROCEDURE — 74019 RADEX ABDOMEN 2 VIEWS: CPT | Mod: 26

## 2024-05-13 PROCEDURE — 76775 US EXAM ABDO BACK WALL LIM: CPT

## 2024-05-13 PROCEDURE — 74019 RADEX ABDOMEN 2 VIEWS: CPT

## 2024-05-14 DIAGNOSIS — N20.1 CALCULUS OF URETER: ICD-10-CM

## 2024-05-15 ENCOUNTER — NON-APPOINTMENT (OUTPATIENT)
Age: 34
End: 2024-05-15

## 2024-05-16 ENCOUNTER — OUTPATIENT (OUTPATIENT)
Dept: OUTPATIENT SERVICES | Facility: HOSPITAL | Age: 34
LOS: 1 days | End: 2024-05-16
Payer: MEDICAID

## 2024-05-16 ENCOUNTER — APPOINTMENT (OUTPATIENT)
Dept: OBGYN | Facility: CLINIC | Age: 34
End: 2024-05-16
Payer: MEDICAID

## 2024-05-16 VITALS
SYSTOLIC BLOOD PRESSURE: 94 MMHG | WEIGHT: 162.38 LBS | BODY MASS INDEX: 27.72 KG/M2 | DIASTOLIC BLOOD PRESSURE: 63 MMHG | HEIGHT: 64 IN

## 2024-05-16 DIAGNOSIS — Z98.890 OTHER SPECIFIED POSTPROCEDURAL STATES: Chronic | ICD-10-CM

## 2024-05-16 PROCEDURE — 99214 OFFICE O/P EST MOD 30 MIN: CPT | Mod: 25

## 2024-05-16 PROCEDURE — 81025 URINE PREGNANCY TEST: CPT

## 2024-05-16 PROCEDURE — 11981 INSERTION DRUG DLVR IMPLANT: CPT | Mod: 59

## 2024-05-16 PROCEDURE — 99459 PELVIC EXAMINATION: CPT

## 2024-05-16 PROCEDURE — 11981 INSERTION DRUG DLVR IMPLANT: CPT

## 2024-05-16 NOTE — HISTORY OF PRESENT ILLNESS
[Complications:___] : complications include: [unfilled] [] : delivered by vaginal delivery [Female] : Delivery History: baby girl [Breastfeeding] : currently nursing [Back Pain] : back pain [Breast Pain] : breast pain [BreastFeeding Problems] : breastfeeding problems [Suicidal Ideation] : no suicidal ideation [Dysuria] : no dysuria [Back to Normal] : is back to normal in size [None] : no vaginal bleeding [Normal] : the vagina was normal [Breast Tenderness Bilateral] : bilateral tenderness [Examination Of The Breasts] : breasts are normal [Doing Well] : is doing well [Excellent Pain Control] : has excellent pain control [FreeTextEntry9] : anemia  [de-identified] : Postpartum evaluation with breast pain and urinary incontinence [de-identified] : desires nexplanon (see procedure note) =- labs ordered -RTC for Annual or PRN - ICE/HOT packs for breast with frequent pumping -pelvic PT , patient has urogyn appointment

## 2024-05-16 NOTE — HISTORY OF PRESENT ILLNESS
[Complications:___] : complications include: [unfilled] [] : delivered by vaginal delivery [Female] : Delivery History: baby girl [Breastfeeding] : currently nursing [Back Pain] : back pain [Breast Pain] : breast pain [BreastFeeding Problems] : breastfeeding problems [Suicidal Ideation] : no suicidal ideation [Dysuria] : no dysuria [Back to Normal] : is back to normal in size [None] : no vaginal bleeding [Normal] : the vagina was normal [Breast Tenderness Bilateral] : bilateral tenderness [Examination Of The Breasts] : breasts are normal [Doing Well] : is doing well [Excellent Pain Control] : has excellent pain control [FreeTextEntry9] : anemia  [de-identified] : Postpartum evaluation with breast pain and urinary incontinence [de-identified] : desires nexplanon (see procedure note) =- labs ordered -RTC for Annual or PRN - ICE/HOT packs for breast with frequent pumping -pelvic PT , patient has urogyn appointment

## 2024-05-20 LAB
HCG UR QL: NEGATIVE
QUALITY CONTROL: YES

## 2024-07-17 ENCOUNTER — APPOINTMENT (OUTPATIENT)
Dept: UROLOGY | Facility: CLINIC | Age: 34
End: 2024-07-17
Payer: MEDICAID

## 2024-07-17 VITALS
DIASTOLIC BLOOD PRESSURE: 69 MMHG | RESPIRATION RATE: 18 BRPM | HEIGHT: 64 IN | BODY MASS INDEX: 27.66 KG/M2 | TEMPERATURE: 98 F | WEIGHT: 162 LBS | OXYGEN SATURATION: 98 % | SYSTOLIC BLOOD PRESSURE: 110 MMHG | HEART RATE: 73 BPM

## 2024-07-17 DIAGNOSIS — N23 UNSPECIFIED RENAL COLIC: ICD-10-CM

## 2024-07-17 DIAGNOSIS — N20.1 CALCULUS OF URETER: ICD-10-CM

## 2024-07-17 PROCEDURE — 99213 OFFICE O/P EST LOW 20 MIN: CPT

## 2024-07-17 PROCEDURE — G2211 COMPLEX E/M VISIT ADD ON: CPT | Mod: NC,1L

## 2024-10-31 ENCOUNTER — NON-APPOINTMENT (OUTPATIENT)
Age: 34
End: 2024-10-31

## 2024-11-11 NOTE — ED PROVIDER NOTE - ATTENDING CONTRIBUTION TO CARE
PRE-OP DIAGNOSIS:  Menorrhagia with regular cycle 11-Nov-2024 13:33:15  Aby Albert A  
I personally evaluated the patient. I reviewed the Resident’s or Physician Assistant’s note (as assigned above), and agree with the findings and plan except as documented in my note.  30 yo woman with non-viable pregnancy with now cramping and bleeding from vagina.  Inevitable  completed by OB at bedside.  Patient improved.  Outpatient follow up for reassessment.  Return for any new or worsening symptoms.

## 2024-11-20 ENCOUNTER — OUTPATIENT (OUTPATIENT)
Dept: OUTPATIENT SERVICES | Facility: HOSPITAL | Age: 34
LOS: 1 days | End: 2024-11-20
Payer: COMMERCIAL

## 2024-11-20 DIAGNOSIS — K02.9 DENTAL CARIES, UNSPECIFIED: ICD-10-CM

## 2024-11-20 DIAGNOSIS — Z98.890 OTHER SPECIFIED POSTPROCEDURAL STATES: Chronic | ICD-10-CM

## 2024-11-20 PROCEDURE — D7140: CPT

## 2024-11-20 PROCEDURE — D7230: CPT

## 2024-11-22 DIAGNOSIS — K01.0 EMBEDDED TEETH: ICD-10-CM

## 2025-02-16 ENCOUNTER — EMERGENCY (EMERGENCY)
Facility: HOSPITAL | Age: 35
LOS: 0 days | Discharge: HOME | End: 2025-02-16
Attending: STUDENT IN AN ORGANIZED HEALTH CARE EDUCATION/TRAINING PROGRAM
Payer: MEDICAID

## 2025-02-16 VITALS
WEIGHT: 152.12 LBS | SYSTOLIC BLOOD PRESSURE: 101 MMHG | RESPIRATION RATE: 18 BRPM | TEMPERATURE: 98 F | DIASTOLIC BLOOD PRESSURE: 67 MMHG | HEART RATE: 92 BPM | OXYGEN SATURATION: 98 %

## 2025-02-16 DIAGNOSIS — Z98.890 OTHER SPECIFIED POSTPROCEDURAL STATES: Chronic | ICD-10-CM

## 2025-02-16 DIAGNOSIS — N61.0 MASTITIS WITHOUT ABSCESS: ICD-10-CM

## 2025-02-16 PROCEDURE — 99284 EMERGENCY DEPT VISIT MOD MDM: CPT | Mod: 25

## 2025-02-16 PROCEDURE — 76882 US LMTD JT/FCL EVL NVASC XTR: CPT | Mod: 26,RT

## 2025-02-16 PROCEDURE — 99284 EMERGENCY DEPT VISIT MOD MDM: CPT

## 2025-02-16 PROCEDURE — 76882 US LMTD JT/FCL EVL NVASC XTR: CPT | Mod: RT

## 2025-02-16 RX ORDER — CEPHALEXIN 500 MG
1 CAPSULE ORAL
Qty: 40 | Refills: 0
Start: 2025-02-16 | End: 2025-02-25

## 2025-02-16 NOTE — ED PROVIDER NOTE - NSFOLLOWUPINSTRUCTIONS_ED_ALL_ED_FT
Follow up with your primary doctor next week  Drink fluids  Warm compresses to area    Mastitis  Front view of a breast showing a tender area of inflammation.  Mastitis is inflammation of the breast tissue. It most often happens in females who are breastfeeding. But it can happen to anyone, including males. It will sometimes go away on its own. Other times, mastitis may need treatment.    What are the causes?  In people who are not lactating, mastitis is usually caused by bacteria that gets into the breast tissue through cuts, cracks, or openings in the skin. Sometimes, mastitis can happen when there are no cuts or openings in the skin.    Other causes include:  Nipple piercing.  Some forms of breast cancer.  What are the signs or symptoms?  Symptoms of this condition include:  Swelling, redness, tenderness, and pain in the breast. The area may also feel warm.  Swelling of the glands under the arm.  Nipple discharge.  Tiredness (fatigue), headache, and flu-like muscle aches.  Fever and chills.  Nausea and vomiting.  Symptoms can last 2–5 days. Breast pain and redness are at their worst on days 2 and 3, but can go away by day 5. If an infection develops and is not treated, a collection of pus (abscess) may form.    How is this diagnosed?  Mastitis is often diagnosed based on a physical exam and your symptoms. You may have other tests, such as:  Blood tests.  Fluid tests. Fluid is removed from the abscess to check for bacteria.  Mammogram or ultrasound tests.  How is this treated?  Treatment may include:  Using hot or cold compresses on the affected area.  Pain medicine.  Antibiotic or steroid medicines.  Self-care, such as rest and drinking more fluids.  Removing fluid from an abscess, if one has formed.  Follow these instructions at home:  Breast care    A person with a cold compress on the side of the breast.  Keep your nipples clean and dry.  If told, put ice on the affected area.  Put ice in a plastic bag.  Place a towel between your skin and the bag.  Leave the ice on for 20 minutes, 2–3 times a day.  If told, apply heat to the affected area as often as told by your health care provider. Use the heat source that your provider recommends, such as a moist heat pack or a heating pad.  Place a towel between your skin and the heat source.  Leave the heat on for 20–30 minutes.  If your skin turns bright red, remove the ice or heat right away to prevent skin damage. The risk of damage is higher if you cannot feel pain, heat, or cold.  Medicines    Take over-the-counter and prescription medicines only as told by your provider.  If you were prescribed antibiotics, take them as told by your provider. Do not stop using the antibiotic even if you start to feel better.  Contact a health care provider if:  You have pus-like discharge coming from the nipple.  You have a fever.  Your pain and swelling get worse.  Your symptoms do not start to get better within 2 days of starting treatment.  Your pain is not helped by medicine.  Get help right away if:  You have a red line going from your breast toward your armpit.  This information is not intended to replace advice given to you by your health care provider. Make sure you discuss any questions you have with your health care provider.

## 2025-02-16 NOTE — ED ADULT NURSE NOTE - NSFALLUNIVINTERV_ED_ALL_ED
Bed/Stretcher in lowest position, wheels locked, appropriate side rails in place/Call bell, personal items and telephone in reach/Instruct patient to call for assistance before getting out of bed/chair/stretcher/Non-slip footwear applied when patient is off stretcher/[place] to call system/Physically safe environment - no spills, clutter or unnecessary equipment/Purposeful proactive rounding/Room/bathroom lighting operational, light cord in reach

## 2025-02-16 NOTE — ED ADULT NURSE NOTE - OBJECTIVE STATEMENT
34yr old female, presenting to ED c/o breast pain. Pt c/o R breast pain/tenderness. Denies n/v/d/fevers/chills. Pt A&Ox4, ambulatory.

## 2025-02-16 NOTE — ED PROVIDER NOTE - CLINICAL SUMMARY MEDICAL DECISION MAKING FREE TEXT BOX
.    34-year-old female, currently breast-feeding, presents with right-sided breast pain and erythema x 1 day.  Patient has not been able to express milk for the past 6-8 hours.  No fever, trauma, chest pain or shortness of breath.    Exam as noted above, + erythema to medial breast with tenderness there, bedside ultrasound shows no abscess, no cobblestoning.    IMP: Cellulitis/mastitis  Patient stable for discharge with antibiotics, outpatient follow-up with her physicians and at the breast clinic, supportive care and return precautions.  DC home      .

## 2025-02-16 NOTE — ED PROVIDER NOTE - OBJECTIVE STATEMENT
34yoF w. no PMH, breast feeding ( 10 m), presents to ED due to R breast pain x 1 day. Pt. states yesterday she was unable to pump for 6 hours and since then she has had pain and unable to breastfeed from that side. No fever, n/v, chest pain or SOB.

## 2025-02-16 NOTE — ED PROVIDER NOTE - PATIENT PORTAL LINK FT
You can access the FollowMyHealth Patient Portal offered by  by registering at the following website: [email or web address]. By joining [facility] FollowMyHealth portal, you will also be able to view your health information using other applications (apps) compatible with our system.

## 2025-02-16 NOTE — ED ADULT TRIAGE NOTE - CHIEF COMPLAINT QUOTE
Pt presents to the ED c/o right breast pain and tenderness. Pt reports not pumping and is now experiencing pain and no production.

## 2025-02-16 NOTE — ED ADULT NURSE NOTE - MODE OF DISCHARGE
Received call from pharmacy to inform ellyn that recent order for tirzepatide (Zepbound) 7.5 mg/0.5 mL auto-injector requires  prior authorization since this is a repeated dose; Zepbound 10 mg is on back order    Pharmacy sent to [facility] on 7/15; Key code # [id number]    Please follow up if following through with prior auth and forward to PA Team for this dose and advise patient.    Ambulatory

## 2025-02-16 NOTE — ED PROVIDER NOTE - PHYSICAL EXAMINATION
VITAL SIGNS: I have reviewed nursing notes and confirm.  CONSTITUTIONAL: well-appearing, non-toxic, NAD  SKIN: Warm dry, normal skin turgor, + redness to top of R breast  HEAD: NCAT  EXT: Full ROM  BREAST: R breast, tenderness and induration, no nipple discharge  PSYCH: Cooperative, appropriate.

## 2025-03-03 ENCOUNTER — APPOINTMENT (OUTPATIENT)
Dept: BREAST CENTER | Facility: CLINIC | Age: 35
End: 2025-03-03
Payer: MEDICAID

## 2025-03-03 VITALS
BODY MASS INDEX: 25.27 KG/M2 | SYSTOLIC BLOOD PRESSURE: 100 MMHG | WEIGHT: 148 LBS | DIASTOLIC BLOOD PRESSURE: 70 MMHG | HEIGHT: 64 IN

## 2025-03-03 DIAGNOSIS — N64.4 MASTODYNIA: ICD-10-CM

## 2025-03-03 PROCEDURE — 99203 OFFICE O/P NEW LOW 30 MIN: CPT
